# Patient Record
Sex: MALE | Race: ASIAN | NOT HISPANIC OR LATINO | Employment: STUDENT | URBAN - METROPOLITAN AREA
[De-identification: names, ages, dates, MRNs, and addresses within clinical notes are randomized per-mention and may not be internally consistent; named-entity substitution may affect disease eponyms.]

---

## 2017-11-01 ENCOUNTER — OFFICE VISIT (OUTPATIENT)
Dept: FAMILY MEDICINE | Facility: CLINIC | Age: 22
End: 2017-11-01
Payer: COMMERCIAL

## 2017-11-01 VITALS
TEMPERATURE: 100.3 F | OXYGEN SATURATION: 98 % | DIASTOLIC BLOOD PRESSURE: 82 MMHG | BODY MASS INDEX: 29.82 KG/M2 | HEART RATE: 96 BPM | WEIGHT: 213 LBS | HEIGHT: 71 IN | SYSTOLIC BLOOD PRESSURE: 139 MMHG

## 2017-11-01 DIAGNOSIS — J02.0 STREPTOCOCCAL PHARYNGITIS: Primary | ICD-10-CM

## 2017-11-01 DIAGNOSIS — R07.0 THROAT PAIN: ICD-10-CM

## 2017-11-01 LAB
DEPRECATED S PYO AG THROAT QL EIA: ABNORMAL
SPECIMEN SOURCE: ABNORMAL

## 2017-11-01 PROCEDURE — 87880 STREP A ASSAY W/OPTIC: CPT | Performed by: FAMILY MEDICINE

## 2017-11-01 PROCEDURE — 99214 OFFICE O/P EST MOD 30 MIN: CPT | Performed by: FAMILY MEDICINE

## 2017-11-01 ASSESSMENT — PAIN SCALES - GENERAL: PAINLEVEL: EXTREME PAIN (8)

## 2017-11-01 NOTE — LETTER
[address] [address]  Phone: [phone]    November 1, 2017        Iker Berrios  [address] [facility] [address]          To whom it may concern:    RE: Iker Berrios    Patient was seen and treated today at our clinic and missed school.    Please contact me for questions or concerns.      Sincerely,        Segundo Pina MD

## 2017-11-01 NOTE — MR AVS SNAPSHOT
After Visit Summary   11/1/2017    Iker Berrios    MRN: [id number]           Patient Information     Date Of Birth          1995        Visit Information        Provider Department      11/1/2017 8:40 AM Segundo Pina MD [facility]        Today's Diagnoses     Streptococcal pharyngitis    -  1    Throat pain          Care Instructions    At [facility], we strive to deliver an exceptional experience to you, every time we see you.  If you receive a survey in the mail, please send us back your thoughts. We really do value your feedback.    Based on your medical history, these are the current health maintenance/preventive care services that you are due for (some may have been done at this visit.)  Health Maintenance Due   Topic Date Due     TETANUS IMMUNIZATION (SYSTEM ASSIGNED)  01/17/2016     INFLUENZA VACCINE (SYSTEM ASSIGNED)  09/01/2017         Suggested websites for health information:  WwwCANWE STUDIOS : Up to date and easily searchable information on multiple topics.  Www.Race Yourself.gov : medication info, interactive tutorials, watch real surgeries online  Www.familydoctor.org : good info from the Academy of Family Physicians  Www.cdc.gov : public health info, travel advisories, epidemics (H1N1)  Www.aap.org : children's health info, normal development, vaccinations  Www.health.state[place] : [place] dept of health, public health issues in [place], N1N1    Your care team:                            Family Medicine Internal Medicine   MD Mike Wei MD Shantel Branch-Fleming, MD Katya Georgiev PA-C Nam Ho, MD Pediatrics   ALEXANDRE Gibson, GERARD Mclaughlin APRN MD Yesenia Duenas MD Deborah Mielke, MD Kim Thein, APRN CNP      Clinic hours: Monday - Thursday 7 am-7 pm; Fridays 7 am-5 pm.   Urgent care: Monday - Friday 11 am-9 pm; Saturday and Sunday 9 am-5 pm.  Pharmacy : Monday -Thursday 8  "am-8 pm; Friday 8 am-6 pm; Saturday and Sunday 9 am-5 pm.     Clinic: [phone]   Pharmacy: [phone]            Follow-ups after your visit        Your next 10 appointments already scheduled     Nov 01, 2017  8:40 AM CDT   Office Visit with Segundo Pina MD   [facility]    [address] [place] [id number]   [phone]           Bring a current list of meds and any records pertaining to this visit. For Physicals, please bring immunization records and any forms needing to be filled out. Please arrive 10 minutes early to complete paperwork.              Who to contact     If you have questions or need follow up information about today's clinic visit or your schedule please contact [facility] directly at [phone].  Normal or non-critical lab and imaging results will be communicated to you by MyChart, letter or phone within 4 business days after the clinic has received the results. If you do not hear from us within 7 days, please contact the clinic through [employer] or phone. If you have a critical or abnormal lab result, we will notify you by phone as soon as possible.  Submit refill requests through [employer] or call your pharmacy and they will forward the refill request to us. Please allow 3 business days for your refill to be completed.          Additional Information About Your Visit        Innography Information     Innography lets you send messages to your doctor, view your test results, renew your prescriptions, schedule appointments and more. To sign up, go to [email or web address] . Click on \"Log in\" on the left side of the screen, which will take you to the Welcome page. Then click on \"Sign up Now\" on the right side of the page.     You will be asked to enter the access code listed below, as well as some personal information. Please follow the directions to create your username and password.     Your access code " "is: [email or web address]  Expires: 2018  8:21 AM     Your access code will  in 90 days. If you need help or a new code, please call your [place] clinic or [phone].        Care EveryWhere ID     This is your Care EveryWhere ID. This could be used by other organizations to access your [place] medical records  [id number]        Your Vitals Were     Pulse Temperature Height Pulse Oximetry BMI (Body Mass Index)       96 100.3  F (37.9  C) (Oral) 5' 10.5\" (1.791 m) 98% 30.13 kg/m2        Blood Pressure from Last 3 Encounters:   17 139/82   11 118/75    Weight from Last 3 Encounters:   17 213 lb (96.6 kg)   11 164 lb 6.4 oz (74.6 kg) (77 %)*     * Growth percentiles are based on [facility] 2-20 Years data.              We Performed the Following     Strep, Rapid Screen          Today's Medication Changes          These changes are accurate as of: 17  8:21 AM.  If you have any questions, ask your nurse or doctor.               Start taking these medicines.        Dose/Directions    amoxicillin-clavulanate 875-125 MG per tablet   Commonly known as:  AUGMENTIN   Used for:  Streptococcal pharyngitis   Started by:  Segundo Pina MD        Dose:  1 tablet   Take 1 tablet by mouth 2 times daily for 10 days   Quantity:  20 tablet   Refills:  0            Where to get your medicines      These medications were sent to [facility] - [place], [place] - [address]  [address], [facility] [address]     Phone:  [phone]     amoxicillin-clavulanate 875-125 MG per tablet                Primary Care Provider Fax #    Provider Not In System [phone]                Equal Access to Services     [facility] AH: Dai Adamson, waumang bain, qaybta kaalmada rachael, kaur arrington. So [facility] [phone].    ATENCIÓN: Si habla español, tiene a massey disposición servicios gratuitos de asistencia lingüística. Llame al " [phone].    We comply with applicable federal civil rights laws and [place] laws. We do not discriminate on the basis of race, color, national origin, age, disability, sex, sexual orientation, or gender identity.            Thank you!     Thank you for choosing [facility]  for your care. Our goal is always to provide you with excellent care. Hearing back from our patients is one way we can continue to improve our services. Please take a few minutes to complete the written survey that you may receive in the mail after your visit with us. Thank you!             Your Updated Medication List - Protect others around you: Learn how to safely use, store and throw away your medicines at www.disposemymeds.org.          This list is accurate as of: 11/1/17  8:21 AM.  Always use your most recent med list.                   Brand Name Dispense Instructions for use Diagnosis    amoxicillin-clavulanate 875-125 MG per tablet    AUGMENTIN    20 tablet    Take 1 tablet by mouth 2 times daily for 10 days    Streptococcal pharyngitis

## 2017-11-01 NOTE — PATIENT INSTRUCTIONS
At [facility], we strive to deliver an exceptional experience to you, every time we see you.  If you receive a survey in the mail, please send us back your thoughts. We really do value your feedback.    Based on your medical history, these are the current health maintenance/preventive care services that you are due for (some may have been done at this visit.)  Health Maintenance Due   Topic Date Due     TETANUS IMMUNIZATION (SYSTEM ASSIGNED)  01/17/2016     INFLUENZA VACCINE (SYSTEM ASSIGNED)  09/01/2017         Suggested websites for health information:  Www.TeamBuy.org : Up to date and easily searchable information on multiple topics.  Www.PeopleString.gov : medication info, interactive tutorials, watch real surgeries online  Www.familydoctor.org : good info from the Academy of Family Physicians  Www.cdc.gov : public health info, travel advisories, epidemics (H1N1)  Www.aap.org : children's health info, normal development, vaccinations  Www.health.[facility].[place].us : [place] dept of health, public health issues in [place], N1N1    Your care team:                            Family Medicine Internal Medicine   MD Mike Wei MD Shantel Branch-Fleming, MD Katya Georgiev PA-C Nam Ho, MD Pediatrics   ALEXANDRE Gibson, GERARD Mclaughlin APRN CNP   MD Yesenia Coleman MD Deborah Mielke, MD Kim Thein, APRN CNP      Clinic hours: Monday - Thursday 7 am-7 pm; Fridays 7 am-5 pm.   Urgent care: Monday - Friday 11 am-9 pm; Saturday and Sunday 9 am-5 pm.  Pharmacy : Monday -Thursday 8 am-8 pm; Friday 8 am-6 pm; Saturday and Sunday 9 am-5 pm.     Clinic: [phone]   Pharmacy: [phone]

## 2017-11-01 NOTE — NURSING NOTE
"Chief Complaint   Patient presents with     Pharyngitis       Initial /82 (BP Location: Left arm, Patient Position: Chair, Cuff Size: Adult Large)  Pulse 96  Temp 100.3  F (37.9  C) (Oral)  Ht 5' 10.5\" (1.791 m)  Wt 213 lb (96.6 kg)  SpO2 98%  BMI 30.13 kg/m2 Estimated body mass index is 30.13 kg/(m^2) as calculated from the following:    Height as of this encounter: 5' 10.5\" (1.791 m).    Weight as of this encounter: 213 lb (96.6 kg).  Medication Reconciliation: complete     Gricelda Ramirez MA      "

## 2017-11-01 NOTE — PROGRESS NOTES
"  SUBJECTIVE:   Iker Berrios is a 21 year old male who presents to clinic today for the following health issues:      Acute Illness   Acute illness concerns: sore throat/fever  Onset: Monday    Fever: YES    Chills/Sweats: YES    Headache (location?): YES    Sinus Pressure:no    Conjunctivitis:  no    Ear Pain: YES: right    Rhinorrhea: no    Congestion: YES    Sore Throat: YES     Cough: no    Wheeze: no    Decreased Appetite: YES    Nausea: no    Vomiting: no    Diarrhea:  no    Dysuria/Freq.: no    Fatigue/Achiness: YES    Sick/Strep Exposure: no     Therapies Tried and outcome: dayquil, nyquil,advil      Problem list and histories reviewed & adjusted, as indicated.  Additional history: as documented    There is no problem list on file for this patient.    Past Surgical History:   Procedure Laterality Date     none         Social History   Substance Use Topics     Smoking status: Never Smoker     Smokeless tobacco: Never Used     Alcohol use No     Family History   Problem Relation Age of Onset     DIABETES Maternal Grandmother      Asthma No family hx of      C.A.D. No family hx of      Hypertension No family hx of      CEREBROVASCULAR DISEASE No family hx of      CANCER No family hx of              Reviewed and updated as needed this visit by clinical staffTobacco  Allergies  Meds  Med Hx  Surg Hx  Fam Hx  Soc Hx      Reviewed and updated as needed this visit by Provider         ROS:  Constitutional, HEENT, cardiovascular, pulmonary, GI, , musculoskeletal, neuro, skin, endocrine and psych systems are negative, except as otherwise noted.      OBJECTIVE:   /82 (BP Location: Left arm, Patient Position: Chair, Cuff Size: Adult Large)  Pulse 96  Temp 100.3  F (37.9  C) (Oral)  Ht 5' 10.5\" (1.791 m)  Wt 213 lb (96.6 kg)  SpO2 98%  BMI 30.13 kg/m2  Body mass index is 30.13 kg/(m^2).  GENERAL: healthy, alert and no distress  NECK: no adenopathy, no asymmetry, masses, or scars and thyroid normal " to palpation  RESP: lungs clear to auscultation - no rales, rhonchi or wheezes  CV: regular rate and rhythm, normal S1 S2, no S3 or S4, no murmur, click or rub, no peripheral edema and peripheral pulses strong  ABDOMEN: soft, nontender, no hepatosplenomegaly, no masses and bowel sounds normal  MS: no gross musculoskeletal defects noted, no edema  PP: erythematous, right tonsil enlarged  Diagnostic Test Results:  none     ASSESSMENT/PLAN:     1. Streptococcal pharyngitis  Treat with Augmentin. If no improvements, will need to think about PTA. Discussed with patient if no improvements in 2 days, patient should f/u in clinic for recheck. Patient understands and agrees with plan.  - amoxicillin-clavulanate (AUGMENTIN) 875-125 MG per tablet; Take 1 tablet by mouth 2 times daily for 10 days  Dispense: 20 tablet; Refill: 0    2. Throat pain  As above.  - Strep, Rapid Screen    See Patient Instructions    Segundo Pina MD, MD  [facility]

## 2018-03-07 ENCOUNTER — OFFICE VISIT (OUTPATIENT)
Dept: URGENT CARE | Facility: URGENT CARE | Age: 23
End: 2018-03-07
Payer: COMMERCIAL

## 2018-03-07 VITALS
OXYGEN SATURATION: 94 % | TEMPERATURE: 103 F | BODY MASS INDEX: 30.71 KG/M2 | WEIGHT: 217.1 LBS | SYSTOLIC BLOOD PRESSURE: 147 MMHG | HEART RATE: 78 BPM | DIASTOLIC BLOOD PRESSURE: 76 MMHG

## 2018-03-07 DIAGNOSIS — J36 PERITONSILLAR ABSCESS: ICD-10-CM

## 2018-03-07 DIAGNOSIS — R19.7 DIARRHEA, UNSPECIFIED TYPE: ICD-10-CM

## 2018-03-07 DIAGNOSIS — R68.89 FLU-LIKE SYMPTOMS: ICD-10-CM

## 2018-03-07 DIAGNOSIS — R07.0 THROAT PAIN: Primary | ICD-10-CM

## 2018-03-07 DIAGNOSIS — R50.9 FEVER AND CHILLS: ICD-10-CM

## 2018-03-07 LAB
ALBUMIN SERPL-MCNC: 3.7 G/DL (ref 3.4–5)
ALP SERPL-CCNC: 57 U/L (ref 40–150)
ALT SERPL W P-5'-P-CCNC: 43 U/L (ref 0–70)
ANION GAP SERPL CALCULATED.3IONS-SCNC: 11 MMOL/L (ref 3–14)
AST SERPL W P-5'-P-CCNC: 63 U/L (ref 0–45)
BASOPHILS # BLD AUTO: 0 10E9/L (ref 0–0.2)
BASOPHILS NFR BLD AUTO: 0.2 %
BILIRUB SERPL-MCNC: 0.2 MG/DL (ref 0.2–1.3)
BUN SERPL-MCNC: 17 MG/DL (ref 7–30)
CALCIUM SERPL-MCNC: 8.2 MG/DL (ref 8.5–10.1)
CHLORIDE SERPL-SCNC: 104 MMOL/L (ref 94–109)
CO2 SERPL-SCNC: 22 MMOL/L (ref 20–32)
CREAT SERPL-MCNC: 1.57 MG/DL (ref 0.66–1.25)
DEPRECATED S PYO AG THROAT QL EIA: NORMAL
DIFFERENTIAL METHOD BLD: ABNORMAL
EOSINOPHIL # BLD AUTO: 0 10E9/L (ref 0–0.7)
EOSINOPHIL NFR BLD AUTO: 0 %
ERYTHROCYTE [DISTWIDTH] IN BLOOD BY AUTOMATED COUNT: 15.3 % (ref 10–15)
FLUAV+FLUBV AG SPEC QL: NEGATIVE
FLUAV+FLUBV AG SPEC QL: NEGATIVE
GFR SERPL CREATININE-BSD FRML MDRD: 54 ML/MIN/1.7M2
GLUCOSE SERPL-MCNC: 88 MG/DL (ref 70–99)
HCT VFR BLD AUTO: 44.6 % (ref 40–53)
HETEROPH AB SER QL: NEGATIVE
HGB BLD-MCNC: 14.5 G/DL (ref 13.3–17.7)
LYMPHOCYTES # BLD AUTO: 1.1 10E9/L (ref 0.8–5.3)
LYMPHOCYTES NFR BLD AUTO: 20.5 %
MCH RBC QN AUTO: 22.8 PG (ref 26.5–33)
MCHC RBC AUTO-ENTMCNC: 32.5 G/DL (ref 31.5–36.5)
MCV RBC AUTO: 70 FL (ref 78–100)
MONOCYTES # BLD AUTO: 0.3 10E9/L (ref 0–1.3)
MONOCYTES NFR BLD AUTO: 6.4 %
NEUTROPHILS # BLD AUTO: 3.9 10E9/L (ref 1.6–8.3)
NEUTROPHILS NFR BLD AUTO: 72.9 %
PLATELET # BLD AUTO: 148 10E9/L (ref 150–450)
POTASSIUM SERPL-SCNC: 4.1 MMOL/L (ref 3.4–5.3)
PROT SERPL-MCNC: 7.8 G/DL (ref 6.8–8.8)
RBC # BLD AUTO: 6.35 10E12/L (ref 4.4–5.9)
SODIUM SERPL-SCNC: 137 MMOL/L (ref 133–144)
SPECIMEN SOURCE: NORMAL
SPECIMEN SOURCE: NORMAL
WBC # BLD AUTO: 5.3 10E9/L (ref 4–11)

## 2018-03-07 PROCEDURE — 85025 COMPLETE CBC W/AUTO DIFF WBC: CPT | Performed by: NURSE PRACTITIONER

## 2018-03-07 PROCEDURE — 99214 OFFICE O/P EST MOD 30 MIN: CPT | Performed by: NURSE PRACTITIONER

## 2018-03-07 PROCEDURE — 87804 INFLUENZA ASSAY W/OPTIC: CPT | Performed by: NURSE PRACTITIONER

## 2018-03-07 PROCEDURE — 86308 HETEROPHILE ANTIBODY SCREEN: CPT | Performed by: NURSE PRACTITIONER

## 2018-03-07 PROCEDURE — 36415 COLL VENOUS BLD VENIPUNCTURE: CPT | Performed by: NURSE PRACTITIONER

## 2018-03-07 PROCEDURE — 87081 CULTURE SCREEN ONLY: CPT | Performed by: NURSE PRACTITIONER

## 2018-03-07 PROCEDURE — 87880 STREP A ASSAY W/OPTIC: CPT | Performed by: NURSE PRACTITIONER

## 2018-03-07 PROCEDURE — 80053 COMPREHEN METABOLIC PANEL: CPT | Performed by: NURSE PRACTITIONER

## 2018-03-07 RX ORDER — LOPERAMIDE HYDROCHLORIDE 2 MG/1
TABLET ORAL
Qty: 30 TABLET | Refills: 0 | Status: SHIPPED | OUTPATIENT
Start: 2018-03-07 | End: 2018-08-15

## 2018-03-07 ASSESSMENT — ENCOUNTER SYMPTOMS
CARDIOVASCULAR NEGATIVE: 1
MYALGIAS: 1
COUGH: 1
ENDOCRINE NEGATIVE: 1
PSYCHIATRIC NEGATIVE: 1
SORE THROAT: 1
RHINORRHEA: 1
FATIGUE: 1
CHILLS: 1
DIARRHEA: 1
FEVER: 1
NEUROLOGICAL NEGATIVE: 1
EYES NEGATIVE: 1

## 2018-03-07 NOTE — NURSING NOTE
The following medication was given:     MEDICATION: Toradol 60 mg  ROUTE: IM  SITE: Ventrogluteal - Left  DOSE: 60 mg   LOT #: [id number]  :  Glaxstar  EXPIRATION DATE:  10/2019  NDC#: 19429-659-96      Delicia Hamilton

## 2018-03-07 NOTE — PROGRESS NOTES
SUBJECTIVE:   Iker Berrios is a 22 year old male presenting with a chief complaint of   Chief Complaint   Patient presents with     Pharyngitis     Patient complains of sore throat, fever, and body aches        He is an established patient of [facility].    Onset of symptoms was 1 week(s) ago.  Course of illness is worsening.    Severity moderate  Current and Associated symptoms: fever, sore throat, body aches   Treatment measures tried include Tylenol/Ibuprofen, night quil  Predisposing factors include None.        Review of Systems   Constitutional: Positive for chills, fatigue and fever.   HENT: Positive for congestion, rhinorrhea and sore throat.    Eyes: Negative.    Respiratory: Positive for cough.    Cardiovascular: Negative.    Gastrointestinal: Positive for diarrhea.   Endocrine: Negative.    Genitourinary: Negative.    Musculoskeletal: Positive for myalgias.   Skin: Negative.    Neurological: Negative.    Psychiatric/Behavioral: Negative.        Past Medical History:   Diagnosis Date     NO ACTIVE PROBLEMS      Family History   Problem Relation Age of Onset     DIABETES Maternal Grandmother      Asthma No family hx of      C.A.D. No family hx of      Hypertension No family hx of      CEREBROVASCULAR DISEASE No family hx of      CANCER No family hx of      Current Outpatient Prescriptions   Medication Sig Dispense Refill     amoxicillin-clavulanate (AUGMENTIN) 875-125 MG per tablet Take 1 tablet by mouth 2 times daily for 10 days 20 tablet 0     loperamide (IMODIUM A-D) 2 MG tablet Take 2 tabs (4 mg) after first loose stool, and then take one tab (2 mg) after each diarrheal stool.  Max of 8 tabs (16 mg) per day. 30 tablet 0     Social History   Substance Use Topics     Smoking status: Never Smoker     Smokeless tobacco: Never Used     Alcohol use No       OBJECTIVE  /76 (BP Location: Left arm, Patient Position: Chair, Cuff Size: Adult Regular)  Pulse 78  Temp 103  F (39.4  C) (Tympanic)  Wt 217 lb  1.6 oz (98.5 kg)  SpO2 94%  BMI 30.71 kg/m2    Physical Exam   Constitutional: He is oriented to person, place, and time. He appears well-developed and well-nourished.   HENT:   Hypertrophy and erythema peritonsillar area.  Also having exudate on the right side. clear nasal rhinorrhea, edema of the nasal mucosa.   Eyes: Conjunctivae and EOM are normal. Pupils are equal, round, and reactive to light.   Neck: Normal range of motion. Neck supple.   Cardiovascular: Normal rate.    Pulmonary/Chest: Effort normal and breath sounds normal.   Abdominal: Soft. Bowel sounds are normal.   Musculoskeletal: Normal range of motion.   Neurological: He is alert and oriented to person, place, and time.   Skin: Skin is warm and dry.       Labs:  Results for orders placed or performed in visit on 03/07/18 (from the past 24 hour(s))   Strep, Rapid Screen   Result Value Ref Range    Specimen Description Throat     Rapid Strep A Screen       NEGATIVE: No Group A streptococcal antigen detected by immunoassay, await culture report.   Influenza A/B antigen   Result Value Ref Range    Influenza A/B Agn Specimen Nasal     Influenza A Negative NEG^Negative    Influenza B Negative NEG^Negative   CBC with platelets and differential   Result Value Ref Range    WBC 5.3 4.0 - 11.0 10e9/L    RBC Count 6.35 (H) 4.4 - 5.9 10e12/L    Hemoglobin 14.5 13.3 - 17.7 g/dL    Hematocrit 44.6 40.0 - 53.0 %    MCV 70 (L) 78 - 100 fl    MCH 22.8 (L) 26.5 - 33.0 pg    MCHC 32.5 31.5 - 36.5 g/dL    RDW 15.3 (H) 10.0 - 15.0 %    Platelet Count 148 (L) 150 - 450 10e9/L    Diff Method Automated Method     % Neutrophils 72.9 %    % Lymphocytes 20.5 %    % Monocytes 6.4 %    % Eosinophils 0.0 %    % Basophils 0.2 %    Absolute Neutrophil 3.9 1.6 - 8.3 10e9/L    Absolute Lymphocytes 1.1 0.8 - 5.3 10e9/L    Absolute Monocytes 0.3 0.0 - 1.3 10e9/L    Absolute Eosinophils 0.0 0.0 - 0.7 10e9/L    Absolute Basophils 0.0 0.0 - 0.2 10e9/L   Mononucleosis screen   Result  Value Ref Range    Mononucleosis Screen Negative NEG^Negative       X-Ray was not done.    ASSESSMENT:      ICD-10-CM    1. Throat pain R07.0 Strep, Rapid Screen     Beta strep group A culture     Mononucleosis screen   2. Flu-like symptoms R68.89 Influenza A/B antigen   3. Peritonsillar abscess J36 amoxicillin-clavulanate (AUGMENTIN) 875-125 MG per tablet   4. Diarrhea, unspecified type R19.7 loperamide (IMODIUM A-D) 2 MG tablet   5. Fever and chills R50.9 CBC with platelets and differential     Comprehensive metabolic panel (BMP + Alb, Alk Phos, ALT, AST, Total. Bili, TP)          PLAN:  I discussed lab results with the patient.  The patient has peritonsillar abscess.  I am ordering an antibiotic and strongly advised follow-up with primary care or to emergency room if symptoms are getting worse.        Patient Instructions     Peritonsillar Abscess    A peritonsillar abscess is a collection of pus that forms near the tonsils. It is a complication of a bacterial infection of the tonsils (tonsillitis). The abscess causes one or both tonsils to swell. The infection and swelling may spread to nearby tissues. If tissues swell enough to block the throat, the condition can become life-threatening. It is also dangerous if the abscess bursts and the infection spreads or is breathed into the lungs. The goal is to treat a peritonsillar abscess before it worsens and threatens your health.  Signs and symptoms of peritonsillar abscess    Severe sore throat (often worse on one side)    Swollen and enlarged tonsils    Fever and chills    Pain when swallowing or trouble opening the mouth    Voice changes     Drooling    Swollen or tender glands in the neck  Diagnosing peritonsillar abscess  Your healthcare provider will examine you and look inside your mouth and throat. You will be asked about your symptoms and health history. Tests or procedures may be done as well, including those listed below.    Throat swab. This test checks  for infection. It is done by wiping a sterile cotton swab in the back of the throat. The swab is then sent to a lab for study.    Blood tests. These might be done to check how your body is responding to the infection.    Ultrasound or computed tomography (CT) scans. These tests provide images of the abscess. They also help rule out other problems.    Needle aspiration. This procedure removes a sample of pus from the abscess with a needle. The sample is then sent to a lab to check for infection. In some cases, all of the pus is removed from the abscess.  Treating peritonsillar abscess  The abscess itself can be treated. Treatment of the underlying infection is also needed. Common treatments are listed below.    Medicines. Antibiotics are needed to treat the underlying infection. These may be taken by mouth or given by IV. Pain relievers may also be given, if needed.    Drainage of the abscess. A procedure may be needed to drain the pus from the abscess. Pus may be removed from the abscess with a needle (needle aspiration). Or, a small incision is made in the abscess. The pus is then drained and suctioned from the throat and mouth. This is called incision and drainage.    Tonsillectomy. This is surgery to remove the tonsils. It may be done if the abscess does not improve with medicines. It may also be done if you have frequent tonsil infections or abscesses.  Recovery and follow-up  Treating the bacterial infection generally relieves the problem. Once the infection resolves, you should recover completely. Follow up with your healthcare provider as directed. And if you develop another throat infection, see your healthcare provider promptly.  Date Last Reviewed: 11/1/2016 2000-2017 [employer]. [address] [place] [address], [place] [address]. All rights reserved. This information is not intended as a substitute for professional medical care. Always follow your healthcare professional's  instructions.

## 2018-03-07 NOTE — MR AVS SNAPSHOT
After Visit Summary   3/7/2018    Iker Berrios    MRN: [id number]           Patient Information     Date Of Birth          1995        Visit Information        Provider Department      3/7/2018 11:35 AM Danika Deluca NP [facility]        Today's Diagnoses     Throat pain    -  1    Flu-like symptoms        Peritonsillar abscess        Diarrhea, unspecified type        Fever and chills          Care Instructions      Peritonsillar Abscess    A peritonsillar abscess is a collection of pus that forms near the tonsils. It is a complication of a bacterial infection of the tonsils (tonsillitis). The abscess causes one or both tonsils to swell. The infection and swelling may spread to nearby tissues. If tissues swell enough to block the throat, the condition can become life-threatening. It is also dangerous if the abscess bursts and the infection spreads or is breathed into the lungs. The goal is to treat a peritonsillar abscess before it worsens and threatens your health.  Signs and symptoms of peritonsillar abscess    Severe sore throat (often worse on one side)    Swollen and enlarged tonsils    Fever and chills    Pain when swallowing or trouble opening the mouth    Voice changes     Drooling    Swollen or tender glands in the neck  Diagnosing peritonsillar abscess  Your healthcare provider will examine you and look inside your mouth and throat. You will be asked about your symptoms and health history. Tests or procedures may be done as well, including those listed below.    Throat swab. This test checks for infection. It is done by wiping a sterile cotton swab in the back of the throat. The swab is then sent to a lab for study.    Blood tests. These might be done to check how your body is responding to the infection.    Ultrasound or computed tomography (CT) scans. These tests provide images of the abscess. They also help rule out other problems.    Needle aspiration. This  procedure removes a sample of pus from the abscess with a needle. The sample is then sent to a lab to check for infection. In some cases, all of the pus is removed from the abscess.  Treating peritonsillar abscess  The abscess itself can be treated. Treatment of the underlying infection is also needed. Common treatments are listed below.    Medicines. Antibiotics are needed to treat the underlying infection. These may be taken by mouth or given by IV. Pain relievers may also be given, if needed.    Drainage of the abscess. A procedure may be needed to drain the pus from the abscess. Pus may be removed from the abscess with a needle (needle aspiration). Or, a small incision is made in the abscess. The pus is then drained and suctioned from the throat and mouth. This is called incision and drainage.    Tonsillectomy. This is surgery to remove the tonsils. It may be done if the abscess does not improve with medicines. It may also be done if you have frequent tonsil infections or abscesses.  Recovery and follow-up  Treating the bacterial infection generally relieves the problem. Once the infection resolves, you should recover completely. Follow up with your healthcare provider as directed. And if you develop another throat infection, see your healthcare provider promptly.  Date Last Reviewed: 11/1/2016 2000-2017 [employer]. [address] [place] [address], [place] [address]. All rights reserved. This information is not intended as a substitute for professional medical care. Always follow your healthcare professional's instructions.                Follow-ups after your visit        Who to contact     If you have questions or need follow up information about today's clinic visit or your schedule please contact [facility] directly at [phone].  Normal or non-critical lab and imaging results will be communicated to you by MyChart, letter or phone within 4 business days after the clinic has  "received the results. If you do not hear from us within 7 days, please contact the clinic through [employer] or phone. If you have a critical or abnormal lab result, we will notify you by phone as soon as possible.  Submit refill requests through [employer] or call your pharmacy and they will forward the refill request to us. Please allow 3 business days for your refill to be completed.          Additional Information About Your Visit        [employer] Information     [employer] lets you send messages to your doctor, view your test results, renew your prescriptions, schedule appointments and more. To sign up, go to [email or web address] . Click on \"Log in\" on the left side of the screen, which will take you to the Welcome page. Then click on \"Sign up Now\" on the right side of the page.     You will be asked to enter the access code listed below, as well as some personal information. Please follow the directions to create your username and password.     Your access code is: [email or web address]  Expires: 2018 12:58 PM     Your access code will  in 90 days. If you need help or a new code, please call your [place] clinic or [phone].        Care EveryWhere ID     This is your Care EveryWhere ID. This could be used by other organizations to access your [place] medical records  [id number]        Your Vitals Were     Pulse Temperature Pulse Oximetry BMI (Body Mass Index)          78 103  F (39.4  C) (Tympanic) 94% 30.71 kg/m2         Blood Pressure from Last 3 Encounters:   18 147/76   17 139/82   11 118/75    Weight from Last 3 Encounters:   18 217 lb 1.6 oz (98.5 kg)   17 213 lb (96.6 kg)   11 164 lb 6.4 oz (74.6 kg) (77 %)*     * Growth percentiles are based on CDC 2-20 Years data.              We Performed the Following     Beta strep group A culture     CBC with platelets and differential     Comprehensive metabolic panel (BMP + Alb, Alk Phos, ALT, AST, Total. Bili, TP)     " Influenza A/B antigen     Mononucleosis screen     Strep, Rapid Screen          Today's Medication Changes          These changes are accurate as of 3/7/18 12:58 PM.  If you have any questions, ask your nurse or doctor.               Start taking these medicines.        Dose/Directions    amoxicillin-clavulanate 875-125 MG per tablet   Commonly known as:  AUGMENTIN   Used for:  Peritonsillar abscess   Started by:  Danika Deluca NP        Dose:  1 tablet   Take 1 tablet by mouth 2 times daily for 10 days   Quantity:  20 tablet   Refills:  0       loperamide 2 MG tablet   Commonly known as:  IMODIUM A-D   Used for:  Diarrhea, unspecified type   Started by:  Danika Deluca NP        Take 2 tabs (4 mg) after first loose stool, and then take one tab (2 mg) after each diarrheal stool.  Max of 8 tabs (16 mg) per day.   Quantity:  30 tablet   Refills:  0            Where to get your medicines      These medications were [place] [facility] [place] - [address]  [address], [facility] [address]     Phone:  [phone]     amoxicillin-clavulanate 875-125 MG per tablet    loperamide 2 MG tablet                Primary Care Provider    Provider Not In System                Equal Access to Services     MATTHEW ADAMS AH: Dai mortensen hadasho Soomaali, waaxda luqadaha, qaybta kaalmada adeegyada, kaur arrington. So [facility] [phone].    ATENCIÓN: Si habla español, tiene a massey disposición servicios gratuitos de asistencia lingüística. Llame al [phone].    We comply with applicable federal civil rights laws and [place] laws. We do not discriminate on the basis of race, color, national origin, age, disability, sex, sexual orientation, or gender identity.            Thank you!     Thank you for choosing [facility]  for your care. Our goal is always to provide you with excellent care. Hearing back from our patients is one way we can continue to  improve our services. Please take a few minutes to complete the written survey that you may receive in the mail after your visit with us. Thank you!             Your Updated Medication List - Protect others around you: Learn how to safely use, store and throw away your medicines at www.disposemymeds.org.          This list is accurate as of 3/7/18 12:58 PM.  Always use your most recent med list.                   Brand Name Dispense Instructions for use Diagnosis    amoxicillin-clavulanate 875-125 MG per tablet    AUGMENTIN    20 tablet    Take 1 tablet by mouth 2 times daily for 10 days    Peritonsillar abscess       loperamide 2 MG tablet    IMODIUM A-D    30 tablet    Take 2 tabs (4 mg) after first loose stool, and then take one tab (2 mg) after each diarrheal stool.  Max of 8 tabs (16 mg) per day.    Diarrhea, unspecified type

## 2018-03-07 NOTE — LETTER
[facility]  [address]  [facility] [address]  Phone: [phone]    March 7, 2018        Iker Berrios  [address]  [facility] [address]          To whom it may concern:    RE: Iker Berrios    Patient was seen and treated today. Please allow him to rest home 3/8/2018.    Please contact me for questions or concerns.      Sincerely,        Danika Deluca NP

## 2018-03-07 NOTE — NURSING NOTE
"Chief Complaint   Patient presents with     Pharyngitis     Patient complains of sore throat, fever, and body aches        Initial /76 (BP Location: Left arm, Patient Position: Chair, Cuff Size: Adult Regular)  Pulse 78  Temp 103  F (39.4  C) (Tympanic)  Wt 217 lb 1.6 oz (98.5 kg)  SpO2 94%  BMI 30.71 kg/m2 Estimated body mass index is 30.71 kg/(m^2) as calculated from the following:    Height as of 11/1/17: 5' 10.5\" (1.791 m).    Weight as of this encounter: 217 lb 1.6 oz (98.5 kg).  Medication Reconciliation: orlando Hamilton    "

## 2018-03-07 NOTE — PATIENT INSTRUCTIONS
Peritonsillar Abscess    A peritonsillar abscess is a collection of pus that forms near the tonsils. It is a complication of a bacterial infection of the tonsils (tonsillitis). The abscess causes one or both tonsils to swell. The infection and swelling may spread to nearby tissues. If tissues swell enough to block the throat, the condition can become life-threatening. It is also dangerous if the abscess bursts and the infection spreads or is breathed into the lungs. The goal is to treat a peritonsillar abscess before it worsens and threatens your health.  Signs and symptoms of peritonsillar abscess    Severe sore throat (often worse on one side)    Swollen and enlarged tonsils    Fever and chills    Pain when swallowing or trouble opening the mouth    Voice changes     Drooling    Swollen or tender glands in the neck  Diagnosing peritonsillar abscess  Your healthcare provider will examine you and look inside your mouth and throat. You will be asked about your symptoms and health history. Tests or procedures may be done as well, including those listed below.    Throat swab. This test checks for infection. It is done by wiping a sterile cotton swab in the back of the throat. The swab is then sent to a lab for study.    Blood tests. These might be done to check how your body is responding to the infection.    Ultrasound or computed tomography (CT) scans. These tests provide images of the abscess. They also help rule out other problems.    Needle aspiration. This procedure removes a sample of pus from the abscess with a needle. The sample is then sent to a lab to check for infection. In some cases, all of the pus is removed from the abscess.  Treating peritonsillar abscess  The abscess itself can be treated. Treatment of the underlying infection is also needed. Common treatments are listed below.    Medicines. Antibiotics are needed to treat the underlying infection. These may be taken by mouth or given by IV. Pain  relievers may also be given, if needed.    Drainage of the abscess. A procedure may be needed to drain the pus from the abscess. Pus may be removed from the abscess with a needle (needle aspiration). Or, a small incision is made in the abscess. The pus is then drained and suctioned from the throat and mouth. This is called incision and drainage.    Tonsillectomy. This is surgery to remove the tonsils. It may be done if the abscess does not improve with medicines. It may also be done if you have frequent tonsil infections or abscesses.  Recovery and follow-up  Treating the bacterial infection generally relieves the problem. Once the infection resolves, you should recover completely. Follow up with your healthcare provider as directed. And if you develop another throat infection, see your healthcare provider promptly.  Date Last Reviewed: 11/1/2016 2000-2017 [employer]. [address] [place] [address]. All rights reserved. This information is not intended as a substitute for professional medical care. Always follow your healthcare professional's instructions.

## 2018-03-08 LAB
BACTERIA SPEC CULT: NORMAL
SPECIMEN SOURCE: NORMAL

## 2018-06-19 ENCOUNTER — TRANSFERRED RECORDS (OUTPATIENT)
Dept: HEALTH INFORMATION MANAGEMENT | Facility: CLINIC | Age: 23
End: 2018-06-19

## 2018-06-21 ENCOUNTER — MEDICAL CORRESPONDENCE (OUTPATIENT)
Dept: HEALTH INFORMATION MANAGEMENT | Facility: CLINIC | Age: 23
End: 2018-06-21

## 2018-06-22 ENCOUNTER — TRANSFERRED RECORDS (OUTPATIENT)
Dept: HEALTH INFORMATION MANAGEMENT | Facility: CLINIC | Age: 23
End: 2018-06-22

## 2018-06-22 DIAGNOSIS — Z21 HUMAN IMMUNODEFICIENCY VIRUS I INFECTION (H): Primary | ICD-10-CM

## 2018-06-22 NOTE — PROGRESS NOTES
Per [facility] Ambulatory Care Protocol, Pt is due for routine labs based on disease state or monitoring of medications. Lab orders entered.  Amber Mackey RN    Pt referred here from [facility].

## 2018-06-25 DIAGNOSIS — Z21 HUMAN IMMUNODEFICIENCY VIRUS I INFECTION (H): ICD-10-CM

## 2018-06-25 LAB
ALBUMIN UR-MCNC: NEGATIVE MG/DL
AMYLASE SERPL-CCNC: 124 U/L (ref 30–110)
APPEARANCE UR: CLEAR
BASOPHILS # BLD AUTO: 0 10E9/L (ref 0–0.2)
BASOPHILS NFR BLD AUTO: 0.7 %
BILIRUB UR QL STRIP: NEGATIVE
COLOR UR AUTO: YELLOW
DIFFERENTIAL METHOD BLD: ABNORMAL
EOSINOPHIL # BLD AUTO: 0.2 10E9/L (ref 0–0.7)
EOSINOPHIL NFR BLD AUTO: 3.4 %
ERYTHROCYTE [DISTWIDTH] IN BLOOD BY AUTOMATED COUNT: 15.5 % (ref 10–15)
GLUCOSE UR STRIP-MCNC: NEGATIVE MG/DL
HBV CORE AB SERPL QL IA: NONREACTIVE
HCT VFR BLD AUTO: 47.3 % (ref 40–53)
HGB BLD-MCNC: 14.2 G/DL (ref 13.3–17.7)
HGB UR QL STRIP: NEGATIVE
IMM GRANULOCYTES # BLD: 0 10E9/L (ref 0–0.4)
IMM GRANULOCYTES NFR BLD: 0.3 %
KETONES UR STRIP-MCNC: NEGATIVE MG/DL
LEUKOCYTE ESTERASE UR QL STRIP: NEGATIVE
LIPASE SERPL-CCNC: 160 U/L (ref 73–393)
LYMPHOCYTES # BLD AUTO: 2.8 10E9/L (ref 0.8–5.3)
LYMPHOCYTES NFR BLD AUTO: 45.3 %
MCH RBC QN AUTO: 22.2 PG (ref 26.5–33)
MCHC RBC AUTO-ENTMCNC: 30 G/DL (ref 31.5–36.5)
MCV RBC AUTO: 74 FL (ref 78–100)
MONOCYTES # BLD AUTO: 0.7 10E9/L (ref 0–1.3)
MONOCYTES NFR BLD AUTO: 10.6 %
MUCOUS THREADS #/AREA URNS LPF: PRESENT /LPF
NEUTROPHILS # BLD AUTO: 2.4 10E9/L (ref 1.6–8.3)
NEUTROPHILS NFR BLD AUTO: 39.7 %
NITRATE UR QL: NEGATIVE
NRBC # BLD AUTO: 0 10*3/UL
NRBC BLD AUTO-RTO: 0 /100
PH UR STRIP: 6 PH (ref 5–7)
PLATELET # BLD AUTO: 300 10E9/L (ref 150–450)
RBC # BLD AUTO: 6.4 10E12/L (ref 4.4–5.9)
RBC #/AREA URNS AUTO: 0 /HPF (ref 0–2)
SOURCE: ABNORMAL
SP GR UR STRIP: 1.02 (ref 1–1.03)
UROBILINOGEN UR STRIP-MCNC: 0 MG/DL (ref 0–2)
WBC # BLD AUTO: 6.1 10E9/L (ref 4–11)
WBC #/AREA URNS AUTO: 0 /HPF (ref 0–5)

## 2018-06-26 LAB
CD3 CELLS # BLD: 2361 CELLS/UL (ref 603–2990)
CD3 CELLS NFR BLD: 77 % (ref 49–84)
CD3+CD4+ CELLS # BLD: 783 CELLS/UL (ref 441–2156)
CD3+CD4+ CELLS NFR BLD: 25 % (ref 28–63)
CD3+CD4+ CELLS/CD3+CD8+ CLL BLD: 0.51 % (ref 1.4–2.6)
CD3+CD8+ CELLS # BLD: 1508 CELLS/UL (ref 125–1312)
CD3+CD8+ CELLS NFR BLD: 49 % (ref 10–40)
CMV IGG SERPL QL IA: >8 AI (ref 0–0.8)
IFC SPECIMEN: ABNORMAL
T GONDII IGG SER QL: <3 IU/ML (ref 0–7.1)
T PALLIDUM AB SER QL: NONREACTIVE

## 2018-06-27 ENCOUNTER — OFFICE VISIT (OUTPATIENT)
Dept: INFECTIOUS DISEASES | Facility: CLINIC | Age: 23
End: 2018-06-27
Attending: INTERNAL MEDICINE
Payer: COMMERCIAL

## 2018-06-27 ENCOUNTER — OFFICE VISIT (OUTPATIENT)
Dept: PHARMACY | Facility: CLINIC | Age: 23
End: 2018-06-27
Payer: COMMERCIAL

## 2018-06-27 ENCOUNTER — RESULTS ONLY (OUTPATIENT)
Dept: OTHER | Facility: CLINIC | Age: 23
End: 2018-06-27

## 2018-06-27 VITALS
OXYGEN SATURATION: 95 % | BODY MASS INDEX: 29.1 KG/M2 | TEMPERATURE: 98.3 F | DIASTOLIC BLOOD PRESSURE: 75 MMHG | SYSTOLIC BLOOD PRESSURE: 128 MMHG | WEIGHT: 207.9 LBS | HEIGHT: 71 IN | HEART RATE: 81 BPM

## 2018-06-27 DIAGNOSIS — Z21 HUMAN IMMUNODEFICIENCY VIRUS I INFECTION (H): Primary | ICD-10-CM

## 2018-06-27 DIAGNOSIS — B20: ICD-10-CM

## 2018-06-27 DIAGNOSIS — R71.8 RBC MICROCYTOSIS: ICD-10-CM

## 2018-06-27 DIAGNOSIS — Z21 HUMAN IMMUNODEFICIENCY VIRUS I INFECTION (H): ICD-10-CM

## 2018-06-27 LAB
ALBUMIN SERPL-MCNC: 4.1 G/DL (ref 3.4–5)
ALP SERPL-CCNC: 62 U/L (ref 40–150)
ALT SERPL W P-5'-P-CCNC: 36 U/L (ref 0–70)
ANION GAP SERPL CALCULATED.3IONS-SCNC: 7 MMOL/L (ref 3–14)
AST SERPL W P-5'-P-CCNC: 39 U/L (ref 0–45)
BILIRUB SERPL-MCNC: 0.4 MG/DL (ref 0.2–1.3)
BUN SERPL-MCNC: 26 MG/DL (ref 7–30)
CALCIUM SERPL-MCNC: 9 MG/DL (ref 8.5–10.1)
CHLORIDE SERPL-SCNC: 106 MMOL/L (ref 94–109)
CO2 SERPL-SCNC: 26 MMOL/L (ref 20–32)
CREAT SERPL-MCNC: 1.15 MG/DL (ref 0.66–1.25)
FERRITIN SERPL-MCNC: 80 NG/ML (ref 26–388)
GFR SERPL CREATININE-BSD FRML MDRD: 78 ML/MIN/1.7M2
GLUCOSE SERPL-MCNC: 73 MG/DL (ref 70–99)
IRON SATN MFR SERPL: 39 % (ref 15–46)
IRON SERPL-MCNC: 142 UG/DL (ref 35–180)
POTASSIUM SERPL-SCNC: 3.8 MMOL/L (ref 3.4–5.3)
PROT SERPL-MCNC: 8.3 G/DL (ref 6.8–8.8)
SODIUM SERPL-SCNC: 139 MMOL/L (ref 133–144)
TIBC SERPL-MCNC: 360 UG/DL (ref 240–430)
TRANSFERRIN SERPL-MCNC: 296 MG/DL (ref 210–360)

## 2018-06-27 PROCEDURE — 87900 PHENOTYPE INFECT AGENT DRUG: CPT | Performed by: INTERNAL MEDICINE

## 2018-06-27 PROCEDURE — 84999 UNLISTED CHEMISTRY PROCEDURE: CPT | Mod: ZF

## 2018-06-27 PROCEDURE — 86480 TB TEST CELL IMMUN MEASURE: CPT | Performed by: INTERNAL MEDICINE

## 2018-06-27 PROCEDURE — 84403 ASSAY OF TOTAL TESTOSTERONE: CPT | Performed by: INTERNAL MEDICINE

## 2018-06-27 PROCEDURE — 81381 HLA I TYPING 1 ALLELE HR: CPT | Performed by: INTERNAL MEDICINE

## 2018-06-27 PROCEDURE — 83550 IRON BINDING TEST: CPT | Performed by: INTERNAL MEDICINE

## 2018-06-27 PROCEDURE — 84270 ASSAY OF SEX HORMONE GLOBUL: CPT | Performed by: INTERNAL MEDICINE

## 2018-06-27 PROCEDURE — 84466 ASSAY OF TRANSFERRIN: CPT | Performed by: INTERNAL MEDICINE

## 2018-06-27 PROCEDURE — 87340 HEPATITIS B SURFACE AG IA: CPT | Performed by: INTERNAL MEDICINE

## 2018-06-27 PROCEDURE — 99207 ZZC NO CHARGE LOS: CPT | Performed by: PHARMACIST

## 2018-06-27 PROCEDURE — 86708 HEPATITIS A ANTIBODY: CPT | Performed by: INTERNAL MEDICINE

## 2018-06-27 PROCEDURE — G0463 HOSPITAL OUTPT CLINIC VISIT: HCPCS | Mod: ZF

## 2018-06-27 PROCEDURE — 80053 COMPREHEN METABOLIC PANEL: CPT | Performed by: INTERNAL MEDICINE

## 2018-06-27 PROCEDURE — 87901 NFCT AGT GNTYP ALYS HIV1 REV: CPT | Mod: ZF

## 2018-06-27 PROCEDURE — 36415 COLL VENOUS BLD VENIPUNCTURE: CPT | Performed by: INTERNAL MEDICINE

## 2018-06-27 PROCEDURE — 82728 ASSAY OF FERRITIN: CPT | Performed by: INTERNAL MEDICINE

## 2018-06-27 PROCEDURE — 87901 NFCT AGT GNTYP ALYS HIV1 REV: CPT | Performed by: INTERNAL MEDICINE

## 2018-06-27 PROCEDURE — 86803 HEPATITIS C AB TEST: CPT | Performed by: INTERNAL MEDICINE

## 2018-06-27 PROCEDURE — 84999 UNLISTED CHEMISTRY PROCEDURE: CPT | Performed by: INTERNAL MEDICINE

## 2018-06-27 PROCEDURE — 82955 ASSAY OF G6PD ENZYME: CPT | Performed by: INTERNAL MEDICINE

## 2018-06-27 PROCEDURE — 86706 HEP B SURFACE ANTIBODY: CPT | Performed by: INTERNAL MEDICINE

## 2018-06-27 PROCEDURE — 87903 PHENOTYPE DNA HIV W/CULTURE: CPT | Performed by: INTERNAL MEDICINE

## 2018-06-27 PROCEDURE — 83540 ASSAY OF IRON: CPT | Performed by: INTERNAL MEDICINE

## 2018-06-27 PROCEDURE — 87904 PHENOTYPE DNA HIV W/CLT ADD: CPT | Performed by: INTERNAL MEDICINE

## 2018-06-27 ASSESSMENT — PAIN SCALES - GENERAL: PAINLEVEL: NO PAIN (0)

## 2018-06-27 NOTE — MR AVS SNAPSHOT
"              After Visit Summary   6/27/2018    Iker Berrios    MRN: [id number]           Patient Information     Date Of Birth          1995        Visit Information        Provider Department      6/27/2018 1:30 PM Maribel Shoemaker RPH [facility] and Infectious Diseases [facility]        Today's Diagnoses     Human immunodeficiency virus I infection (H)    -  1       Follow-ups after your visit        Your next 10 appointments already scheduled     Aug 01, 2018  1:30 PM CDT   (Arrive by 1:15 PM)   Return Visit with Marci Rose DO   [facility] and Infectious Diseases ([facility])    [address]  [address]  [facility] [address] [phone]              Who to contact     If you have questions or need follow up information about today's clinic visit or your schedule please contact [facility] AND INFECTIOUS DISEASES [facility] directly at [phone].  Normal or non-critical lab and imaging results will be communicated to you by MyChart, letter or phone within 4 business days after the clinic has received the results. If you do not hear from us within 7 days, please contact the clinic through [employer] or phone. If you have a critical or abnormal lab result, we will notify you by phone as soon as possible.  Submit refill requests through [employer] or call your pharmacy and they will forward the refill request to us. Please allow 3 business days for your refill to be completed.          Additional Information About Your Visit        [employer] Information     [employer] lets you send messages to your doctor, view your test results, renew your prescriptions, schedule appointments and more. To sign up, go to [email or web address] . Click on \"Log in\" on the left side of the screen, which will take you to the Welcome page. Then click on \"Sign up Now\" on the right side of the page.     You will be asked to enter the access code listed below, as well " as some personal information. Please follow the directions to create your username and password.     Your access code is: [email or web address]  Expires: 2018  6:30 AM     Your access code will  in 90 days. If you need help or a new code, please call your [place] clinic or [phone].        Care EveryWhere ID     This is your Care EveryWhere ID. This could be used by other organizations to access your [place] medical records  [id number]         Blood Pressure from Last 3 Encounters:   18 128/75   18 147/76   17 139/82    Weight from Last 3 Encounters:   18 207 lb 14.4 oz (94.3 kg)   18 217 lb 1.6 oz (98.5 kg)   17 213 lb (96.6 kg)              Today, you had the following     No orders found for display       Primary Care Provider Fax #    Physician No Ref-Primary [phone]       No address on file        Equal Access to Services     MATTHEW ADAMS : Hadii reagan ku hadasho Soomaali, waaxda luqadaha, qaybta kaalmada adeegyada, kaur delaney . So [facility] [phone].    ATENCIÓN: Si habla español, tiene a massey disposición servicios gratuitos de asistencia lingüística. Llame al [phone].    We comply with applicable federal civil rights laws and [place] laws. We do not discriminate on the basis of race, color, national origin, age, disability, sex, sexual orientation, or gender identity.            Thank you!     Thank you for choosing [facility] AND INFECTIOUS DISEASES [facility]  for your care. Our goal is always to provide you with excellent care. Hearing back from our patients is one way we can continue to improve our services. Please take a few minutes to complete the written survey that you may receive in the mail after your visit with us. Thank you!             Your Updated Medication List - Protect others around you: Learn how to safely use, store and throw away your medicines at www.disposemymeds.org.          This list is accurate  as of 6/27/18 11:59 PM.  Always use your most recent med list.                   Brand Name Dispense Instructions for use Diagnosis    loperamide 2 MG tablet    IMODIUM A-D    30 tablet    Take 2 tabs (4 mg) after first loose stool, and then take one tab (2 mg) after each diarrheal stool.  Max of 8 tabs (16 mg) per day.    Diarrhea, unspecified type

## 2018-06-27 NOTE — PROGRESS NOTES
[facility]  Infectious Disease Clinic Note     Patient:  Iker Berrios, Date of birth 1995, Medical record number [id number]  Date of Visit:  06/27/2018         Assessment and Plan:   Plan:  1. Routine HIV labs  2. Currently does not have medication insurance, but plan to start Triumeq   3. Follow up in one month    Assessment:  Iker Berrios is a 21 y/o who presents to establish care. Found to be HIV seropositive on 6/19/2018.     Patient discussed with Dr. Colin Rose, Infectious Diseases Fellow  [phone]        History of the Infectious Disease Illness:     Iker Berrios is a 21 y/o who presents to [facility]. He had been on Truvada for PrEP from 2310-5803. Tested HIV negative coming off of Truvada. He was noted to have a viral illness in March 2018 that last about 2 weeks. He had presented to [place] with one week of fevers, sore throat, myalgias. He had been tested for Strep throat and found to be negative.     Diagnosed HIV positive: 6/19/2018 at [facility]   Viral load at diagnosis: 2654  CD4 count at diagnosis: 783  ART start date: Needs insurance coverage to start medications   HIV Genotype: Pending   OI's: None   STI: 6/19/2018 GC/Chlamydia, 6/27/2018 RPR Negative     Serology: Hepatitis A Reactive, Hepatitis B surface Antigen Non reactive, Hepatitis C antibody Non reactive   TB Quant Negative  CMV Positive  Toxo Negative    Past Medical History:   None    Social History:   No smoking   No alcohol   No illicit drugs  Sexual preference: MSM, non consistent condom use.    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 to [employer] for three years  Currently enrolled at the [employer] majoring in psychology       Family History:   Non-contributory     Surgical History:   Right femur fracture and repair in [place]      Immunization History   Administered Date(s) Administered     DTAP (<7y) 07/14/1999     HepB 02/01/1994, 04/07/1994, 11/01/1994  "    Historical HIB 02/01/1994, 04/07/1994, 06/14/1994, 03/14/1995     MMR 03/14/1995, 07/14/1999     Meningococcal (Menomune ) 01/17/2006     OPV, trivalent, live 02/01/1994, 04/07/1994, 03/14/1995, 07/14/1999     TDAP Vaccine (Adacel) 01/17/2006          Review of Systems:   CONSTITUTIONAL:  No fevers or chills  EYES: negative for icterus  ENT:  negative for hearing loss, tinnitus and sore throat  RESPIRATORY:  negative for cough with sputum and dyspnea  CARDIOVASCULAR:  negative for chest pain, dyspnea  GASTROINTESTINAL:  negative for nausea, vomiting, diarrhea and constipation  GENITOURINARY:  negative for dysuria  HEME:  No easy bruising  INTEGUMENT:  negative for rash and pruritus  NEURO:  Negative for headache         Current Medications (antimicrobials listed in bold):     Current Outpatient Prescriptions   Medication Sig Dispense Refill     loperamide (IMODIUM A-D) 2 MG tablet Take 2 tabs (4 mg) after first loose stool, and then take one tab (2 mg) after each diarrheal stool.  Max of 8 tabs (16 mg) per day. (Patient not taking: Reported on 6/27/2018) 30 tablet 0          Allergies:   No Known Allergies       Physical Exam:   Vitals were reviewed.  All vitals stable  /75  Pulse 81  Temp 98.3  F (36.8  C) (Oral)  Ht 1.791 m (5' 10.5\")  Wt 94.3 kg (207 lb 14.4 oz)  SpO2 95%  BMI 29.41 kg/m2  Exam:  GENERAL:  well-developed, well-nourished, alert, oriented, in no acute distress.  HEENT:  Head is normocephalic, atraumatic     EYES:  Eyes have anicteric sclerae.    ENT:  Oropharynx is moist without exudates or ulcers.  NECK:  Supple.  LUNGS:  Clear to auscultation.  CARDIOVASCULAR:  Regular rate and rhythm with no murmurs, gallops or rubs.  ABDOMEN:  Normal bowel sounds, soft, nontender.  SKIN:  No acute rashes.    NEUROLOGIC:  Grossly nonfocal.         Laboratory Data:     CD4 counts    Absolute CD4   Date Value Ref Range Status   06/25/2018 783 441 - 2156 cells/uL Final     Metabolic Studies  "   Recent Labs   Lab Test  06/27/18   1447  03/07/18   1303   NA  139  137   POTASSIUM  3.8  4.1   CHLORIDE  106  104   CO2  26  22   ANIONGAP  7  11   BUN  26  17   CR  1.15  1.57*   GFRESTIMATED  78  54*   GLC  73  88   MARIELY  9.0  8.2*     Hepatic Studies    Recent Labs   Lab Test  06/27/18   1447  03/07/18   1303   BILITOTAL  0.4  0.2   ALKPHOS  62  57   ALBUMIN  4.1  3.7   AST  39  63*   ALT  36  43     Pancreatitis testing    Recent Labs   Lab Test  06/25/18   1415   AMYLASE  124*   LIPASE  160     Hematology Studies    Recent Labs   Lab Test  06/25/18   1415  03/07/18   1303   WBC  6.1  5.3   ANEU  2.4  3.9   ALYM  2.8  1.1   JANAE  0.7  0.3   AEOS  0.2  0.0   HGB  14.2  14.5   HCT  47.3  44.6   MCV  74*  70*   PLT  300  148*

## 2018-06-27 NOTE — NURSING NOTE
"Chief Complaint   Patient presents with     Consult     b20     /75  Pulse 81  Temp 98.3  F (36.8  C) (Oral)  Ht 1.791 m (5' 10.5\")  Wt 94.3 kg (207 lb 14.4 oz)  SpO2 95%  BMI 29.41 kg/m2  Teresa Ku, BILLIE    "

## 2018-06-27 NOTE — MR AVS SNAPSHOT
"              After Visit Summary   6/27/2018    Iker Berrios    MRN: [id number]           Patient Information     Date Of Birth          1995        Visit Information        Provider Department      6/27/2018 12:30 PM Marci Rose DO M [facility] and Infectious Diseases        Today's Diagnoses     Human immunodeficiency virus I infection (H)    -  1    RBC microcytosis           Follow-ups after your visit        Follow-up notes from your care team     Return in about 1 month (around 7/27/2018).      Your next 10 appointments already scheduled     Aug 01, 2018  1:30 PM CDT   (Arrive by 1:15 PM)   Return Visit with DO GEORGE Clifford [facility] and Infectious Diseases ([facility] [place])    [address]  [address]  [facility] [address] [phone]              Who to contact     If you have questions or need follow up information about today's clinic visit or your schedule please contact [facility] AND INFECTIOUS DISEASES directly at [phone].  Normal or non-critical lab and imaging results will be communicated to you by FindTheBesthart, letter or phone within 4 business days after the clinic has received the results. If you do not hear from us within 7 days, please contact the clinic through FindTheBesthart or phone. If you have a critical or abnormal lab result, we will notify you by phone as soon as possible.  Submit refill requests through CyberArts or call your pharmacy and they will forward the refill request to us. Please allow 3 business days for your refill to be completed.          Additional Information About Your Visit        FindTheBesthart Information     [employer]berArts lets you send messages to your doctor, view your test results, renew your prescriptions, schedule appointments and more. To sign up, go to [email or web address] . Click on \"Log in\" on the left side of the screen, which will take you to the Welcome page. Then click on \"Sign up " "Now\" on the right side of the page.     You will be asked to enter the access code listed below, as well as some personal information. Please follow the directions to create your username and password.     Your access code is: [email or web address]  Expires: 2018  6:30 AM     Your access code will  in 90 days. If you need help or a new code, please call your [place] clinic or [phone].        Care EveryWhere ID     This is your Care EveryWhere ID. This could be used by other organizations to access your [place] medical records  [id number]        Your Vitals Were     Pulse Temperature Height Pulse Oximetry BMI (Body Mass Index)       81 98.3  F (36.8  C) (Oral) 1.791 m (5' 10.5\") 95% 29.41 kg/m2        Blood Pressure from Last 3 Encounters:   18 128/75   18 147/76   17 139/82    Weight from Last 3 Encounters:   18 94.3 kg (207 lb 14.4 oz)   18 98.5 kg (217 lb 1.6 oz)   17 96.6 kg (213 lb)              We Performed the Following     Glucose 6 phosphate dehydrogenase     HIV phenotyping with genotype     HLA Single Antigen [place] Type        Primary Care Provider Fax #    Physician No Ref-Primary [phone]       No address on file        Equal Access to Services     MATTHEW ADAMS : Hadmark Adamson, waaxda taya, qaybta kaalkaur clancy . So [facility] [phone].    ATENCIÓN: Si habla español, tiene a massey disposición servicios gratuitos de asistencia lingüística. Llame al [phone].    We comply with applicable federal civil rights laws and [place] laws. We do not discriminate on the basis of race, color, national origin, age, disability, sex, sexual orientation, or gender identity.            Thank you!     Thank you for choosing [facility] AND INFECTIOUS DISEASES  for your care. Our goal is always to provide you with excellent care. Hearing back from our patients is one way we can continue to " improve our services. Please take a few minutes to complete the written survey that you may receive in the mail after your visit with us. Thank you!             Your Updated Medication List - Protect others around you: Learn how to safely use, store and throw away your medicines at www.disposemymeds.org.          This list is accurate as of 6/27/18  3:31 PM.  Always use your most recent med list.                   Brand Name Dispense Instructions for use Diagnosis    loperamide 2 MG tablet    IMODIUM A-D    30 tablet    Take 2 tabs (4 mg) after first loose stool, and then take one tab (2 mg) after each diarrheal stool.  Max of 8 tabs (16 mg) per day.    Diarrhea, unspecified type

## 2018-06-28 LAB
HAV IGG SER QL IA: REACTIVE
HBV SURFACE AB SERPL IA-ACNC: >1000 M[IU]/ML
HBV SURFACE AG SERPL QL IA: NONREACTIVE
HCV AB SERPL QL IA: NONREACTIVE
HLA SINGLE ANTIGEN ALLELE TYPE: NORMAL

## 2018-06-29 LAB
G6PD RBC-CCNC: 16 U/G HB (ref 9.9–16.6)
HIV PHENOTYPE: NORMAL
HIV1 RNA # PLAS NAA DL=20: 2654 {COPIES}/ML
HIV1 RNA SERPL NAA+PROBE-LOG#: 3.4 {LOG_COPIES}/ML
M TB TUBERC IFN-G BLD QL: NEGATIVE
M TB TUBERC IFN-G/MITOGEN IGNF BLD: 0 IU/ML
SHBG SERPL-SCNC: 28 NMOL/L (ref 11–80)
TESTOST FREE SERPL-MCNC: 16.5 NG/DL (ref 4.7–24.4)
TESTOST SERPL-MCNC: 681 NG/DL (ref 240–950)

## 2018-06-29 NOTE — PROGRESS NOTES
Clinical Consult:                                                    Iker Berrios is a 22 year old male coming in for a clinical pharmacist consult.  He was referred to me from Dr. Rose.     Reason for Consult: New medication start with Triumeq     Discussion: Pt reports he is ready to start therapy by taking 1 pill 1 daily.  Reports he understands the importance of medication adherence and will do his very best to take medication every single day with no missed doses.  He is interested in knowing about possible side effects and when the best time to take his medications.    Plan:  1.  Patient has insurance from [employer], ([employer]) which will not cover office visits, labs or medication from our clinic. Pt filled out Program  paper work. Per Tamra Ventura at the Program  office, this patient is eligible for medical assistance benefits, even though he has [place] benefits.  Tamra reports he needs to answer a few more questions on the form, and submit  his proof of income.  She said once this is done, he will have retroactive medical assistance and will have benefits that go back for for 3 months.  Patient needs to answer question #7 that states he will need retroactive coverage for medical assistance.  2.  I have left 3 messages for this patient to call me.  I left a message asking him to please bring in his proof of income, and to come in and answer a few more questions on the program H form.  He was asked to call me back when he got my message and he has not done so, as of Friday, June 29.  3. If HLA is negative, pt will start therapy on Triumeq.  We reviewed in detail dosing instructions, possible side effects, and the importance of strict medication adherence.  4. I will try calling patient again on Monday, July 2.    Maribel Shoemaker, [facility] Pharmacist.   [phone]

## 2018-07-03 LAB
HLA TYPE SA METHOD: NORMAL
HLA TYPE SA RESULT: NORMAL

## 2018-07-07 NOTE — PROGRESS NOTES
[facility] Staff Note: Mr. Cope was seen, examined, and the case was discussed with Dr. Rose, ID Fellow -- I agree with her consultative history and examination, assessment and plan in this outpatient ID / HIV Consult note. This note reflects my observations and opinions, and the plan outlined fully reflects my approach. I have reviewed the available history, radiology, laboratory results, and reports with the Fellow.

## 2018-07-09 ENCOUNTER — OFFICE VISIT (OUTPATIENT)
Dept: PHARMACY | Facility: CLINIC | Age: 23
End: 2018-07-09
Payer: COMMERCIAL

## 2018-07-09 DIAGNOSIS — Z21 HUMAN IMMUNODEFICIENCY VIRUS I INFECTION (H): Primary | ICD-10-CM

## 2018-07-09 DIAGNOSIS — Z21 HIV (HUMAN IMMUNODEFICIENCY VIRUS INFECTION) (H): Primary | ICD-10-CM

## 2018-07-09 PROCEDURE — 99207 ZZC NO CHARGE LOS: CPT | Performed by: PHARMACIST

## 2018-07-09 NOTE — PROGRESS NOTES
Clinical Consult:                                                    Iker Berrios is a 22 year old male coming in for a clinical pharmacist consult.  He was referred to me from Dr. Rose.    Reason for Consult: New med start with Triumeq today. (delayed due to insurance difficulties/waiting for HLA test result).    Discussion: Iker reports he will take Triaminic at bedtime.  Reports he understands the importance of strict medication adherence.  He will call with any questions or concerns.    Plan:  1. HLA negative.  2. Will start Triumeq tonight.  3. Reviewed dosing, importance of medication adherence, possible side effects, including rash. He is asked to stop medication and call us if he develops a rash.  4.  Appointment with Dr. Luo was changed to 5 weeks out patient will have labs in 4 weeks.  5. Our pharmacy will call him in 3 weeks to refill medication.   6. Pt was supplied with a bed box and key chain pill chin.     He is asked to call with any questions or concerns.    Maribel Shoemaker, [facility] Pharmacist.   [phone]

## 2018-07-09 NOTE — PROGRESS NOTES
Per [facility] Ambulatory Care Protocol, Pt is due for routine labs based on disease state or monitoring of medications. Lab orders entered.  Sherie Yates RN'

## 2018-07-09 NOTE — MR AVS SNAPSHOT
After Visit Summary   7/9/2018    Iker Berrios    MRN: [id number]           Patient Information     Date Of Birth          1995        Visit Information        Provider Department      7/9/2018 12:00 PM Maribel Shoemaker RPH [facility] and Infectious Diseases [facility]        Today's Diagnoses     Human immunodeficiency virus I infection (H)    -  1       Follow-ups after your visit        Your next 10 appointments already scheduled     Aug 08, 2018 12:30 PM CDT   LAB with  LAB   [facility])    [address] [place] [address]  1st Floor  [facility] [id number] [phone]           Please do not eat 10-12 hours before your appointment if you are coming in fasting for labs on lipids, cholesterol, or glucose (sugar). This does not apply to pregnant women. Water, hot tea and black coffee (with nothing added) are okay. Do not drink other fluids, diet soda or chew gum.            Aug 15, 2018  1:00 PM CDT   (Arrive by 12:45 PM)   Return Visit with Marci Rose DO   [facility] and Infectious Diseases ([facility])    [address] [place] [address]  [address]  [facility] [address] [phone]              Who to contact     If you have questions or need follow up information about today's clinic visit or your schedule please contact [facility] AND INFECTIOUS DISEASES [facility] directly at [phone].  Normal or non-critical lab and imaging results will be communicated to you by MyChart, letter or phone within 4 business days after the clinic has received the results. If you do not hear from us within 7 days, please contact the clinic through MyChart or phone. If you have a critical or abnormal lab result, we will notify you by phone as soon as possible.  Submit refill requests through [employer] or call your pharmacy and they will forward the refill request to us. Please allow 3 business days for your refill to be  completed.          Additional Information About Your Visit        MyChart Information     [employer] gives you secure access to your electronic health record. If you see a primary care provider, you can also send messages to your care team and make appointments. If you have questions, please call your primary care clinic.  If you do not have a primary care provider, please call [phone] and they will assist you.        Care EveryWhere ID     This is your Care EveryWhere ID. This could be used by other organizations to access your [place] medical records  [id number]         Blood Pressure from Last 3 Encounters:   06/27/18 128/75   03/07/18 147/76   11/01/17 139/82    Weight from Last 3 Encounters:   06/27/18 94.3 kg (207 lb 14.4 oz)   03/07/18 98.5 kg (217 lb 1.6 oz)   11/01/17 96.6 kg (213 lb)              Today, you had the following     No orders found for display         Today's Medication Changes          These changes are accurate as of 7/9/18  1:10 PM.  If you have any questions, ask your nurse or doctor.               Start taking these medicines.        Dose/Directions    abacavir-dolutegravir-LamiVUDine 600- MG per tablet   Commonly known as:  TRIUMEQ   Used for:  Human immunodeficiency virus I infection (H)        Dose:  1 tablet   Take 1 tablet by mouth daily   Quantity:  30 tablet   Refills:  11            Where to get your medicines      These medications were sent to [facility] [address] [address]    Hours:  TRANSPLANT PHONE NUMBER [phone] Phone:  [phone]     abacavir-dolutegravir-LamiVUDine 600- MG per tablet                Primary Care Provider Fax #    Physician No Ref-Primary [phone]       No address on file        Equal Access to Services     MATTHEW ADAMS : Dai Adamson, justine bain, kaur olivares  latiana arrington. So [facility] [phone].    ATENCIÓN: Si habla nely, tiene a massey disposición servicios gratuitos de asistencia lingüística. Emil al [phone].    We comply with applicable federal civil rights laws and [place] laws. We do not discriminate on the basis of race, color, national origin, age, disability, sex, sexual orientation, or gender identity.            Thank you!     Thank you for choosing [facility] AND INFECTIOUS DISEASES [facility]  for your care. Our goal is always to provide you with excellent care. Hearing back from our patients is one way we can continue to improve our services. Please take a few minutes to complete the written survey that you may receive in the mail after your visit with us. Thank you!             Your Updated Medication List - Protect others around you: Learn how to safely use, store and throw away your medicines at www.disposemymeds.org.          This list is accurate as of 7/9/18  1:10 PM.  Always use your most recent med list.                   Brand Name Dispense Instructions for use Diagnosis    abacavir-dolutegravir-LamiVUDine 600- MG per tablet    TRIUMEQ    30 tablet    Take 1 tablet by mouth daily    Human immunodeficiency virus I infection (H)       loperamide 2 MG tablet    IMODIUM A-D    30 tablet    Take 2 tabs (4 mg) after first loose stool, and then take one tab (2 mg) after each diarrheal stool.  Max of 8 tabs (16 mg) per day.    Diarrhea, unspecified type

## 2018-07-27 LAB — LAB SCANNED RESULT: NORMAL

## 2018-08-06 ENCOUNTER — TELEPHONE (OUTPATIENT)
Dept: PHARMACY | Facility: CLINIC | Age: 23
End: 2018-08-06

## 2018-08-06 NOTE — TELEPHONE ENCOUNTER
Iker called to report he would like to refill Triumeq. Reports no missed doses since starting medication one month ago. Reports no known side effects. Reports he will  medication tomorrow, and knows he has an appt on 08/15. He will come in tomorrow to do labs.    Maribel Shoemaker, [facility] Pharmacist.   [phone]    Refill reminder: 08/27

## 2018-08-07 DIAGNOSIS — Z21 HIV (HUMAN IMMUNODEFICIENCY VIRUS INFECTION) (H): ICD-10-CM

## 2018-08-07 LAB
ALBUMIN SERPL-MCNC: 4 G/DL (ref 3.4–5)
ALP SERPL-CCNC: 82 U/L (ref 40–150)
ALT SERPL W P-5'-P-CCNC: 33 U/L (ref 0–70)
ANION GAP SERPL CALCULATED.3IONS-SCNC: 7 MMOL/L (ref 3–14)
AST SERPL W P-5'-P-CCNC: 29 U/L (ref 0–45)
BASOPHILS # BLD AUTO: 0 10E9/L (ref 0–0.2)
BASOPHILS NFR BLD AUTO: 0.6 %
BILIRUB SERPL-MCNC: 0.3 MG/DL (ref 0.2–1.3)
BUN SERPL-MCNC: 18 MG/DL (ref 7–30)
CALCIUM SERPL-MCNC: 9.4 MG/DL (ref 8.5–10.1)
CD3 CELLS # BLD: 987 CELLS/UL (ref 603–2990)
CD3 CELLS NFR BLD: 71 % (ref 49–84)
CD3+CD4+ CELLS # BLD: 493 CELLS/UL (ref 441–2156)
CD3+CD4+ CELLS NFR BLD: 36 % (ref 28–63)
CD3+CD4+ CELLS/CD3+CD8+ CLL BLD: 1.09 % (ref 1.4–2.6)
CD3+CD8+ CELLS # BLD: 454 CELLS/UL (ref 125–1312)
CD3+CD8+ CELLS NFR BLD: 33 % (ref 10–40)
CHLORIDE SERPL-SCNC: 103 MMOL/L (ref 94–109)
CO2 SERPL-SCNC: 27 MMOL/L (ref 20–32)
CREAT SERPL-MCNC: 1.54 MG/DL (ref 0.66–1.25)
DIFFERENTIAL METHOD BLD: ABNORMAL
EOSINOPHIL # BLD AUTO: 0.2 10E9/L (ref 0–0.7)
EOSINOPHIL NFR BLD AUTO: 3.1 %
ERYTHROCYTE [DISTWIDTH] IN BLOOD BY AUTOMATED COUNT: 15.7 % (ref 10–15)
GFR SERPL CREATININE-BSD FRML MDRD: 55 ML/MIN/1.7M2
GLUCOSE SERPL-MCNC: 78 MG/DL (ref 70–99)
HCT VFR BLD AUTO: 49.8 % (ref 40–53)
HGB BLD-MCNC: 15 G/DL (ref 13.3–17.7)
IFC SPECIMEN: ABNORMAL
IMM GRANULOCYTES # BLD: 0 10E9/L (ref 0–0.4)
IMM GRANULOCYTES NFR BLD: 0.6 %
LYMPHOCYTES # BLD AUTO: 1.2 10E9/L (ref 0.8–5.3)
LYMPHOCYTES NFR BLD AUTO: 22.6 %
MCH RBC QN AUTO: 22.2 PG (ref 26.5–33)
MCHC RBC AUTO-ENTMCNC: 30.1 G/DL (ref 31.5–36.5)
MCV RBC AUTO: 74 FL (ref 78–100)
MONOCYTES # BLD AUTO: 0.9 10E9/L (ref 0–1.3)
MONOCYTES NFR BLD AUTO: 16.3 %
NEUTROPHILS # BLD AUTO: 3.1 10E9/L (ref 1.6–8.3)
NEUTROPHILS NFR BLD AUTO: 56.8 %
NRBC # BLD AUTO: 0 10*3/UL
NRBC BLD AUTO-RTO: 0 /100
PLATELET # BLD AUTO: 274 10E9/L (ref 150–450)
POTASSIUM SERPL-SCNC: 4.1 MMOL/L (ref 3.4–5.3)
PROT SERPL-MCNC: 9.1 G/DL (ref 6.8–8.8)
RBC # BLD AUTO: 6.75 10E12/L (ref 4.4–5.9)
SODIUM SERPL-SCNC: 137 MMOL/L (ref 133–144)
WBC # BLD AUTO: 5.5 10E9/L (ref 4–11)

## 2018-08-10 LAB
HIV1 RNA # PLAS NAA DL=20: 67 {COPIES}/ML
HIV1 RNA SERPL NAA+PROBE-LOG#: 1.8 {LOG_COPIES}/ML

## 2018-08-14 ENCOUNTER — MYC MEDICAL ADVICE (OUTPATIENT)
Dept: INFECTIOUS DISEASES | Facility: CLINIC | Age: 23
End: 2018-08-14

## 2018-08-15 ENCOUNTER — OFFICE VISIT (OUTPATIENT)
Dept: INFECTIOUS DISEASES | Facility: CLINIC | Age: 23
End: 2018-08-15
Attending: INTERNAL MEDICINE
Payer: COMMERCIAL

## 2018-08-15 VITALS
DIASTOLIC BLOOD PRESSURE: 73 MMHG | RESPIRATION RATE: 18 BRPM | SYSTOLIC BLOOD PRESSURE: 132 MMHG | OXYGEN SATURATION: 100 % | TEMPERATURE: 98.1 F | HEART RATE: 85 BPM | BODY MASS INDEX: 29.54 KG/M2 | HEIGHT: 71 IN | WEIGHT: 211 LBS

## 2018-08-15 DIAGNOSIS — Z21 HUMAN IMMUNODEFICIENCY VIRUS I INFECTION (H): Primary | ICD-10-CM

## 2018-08-15 DIAGNOSIS — B36.0 TINEA VERSICOLOR: ICD-10-CM

## 2018-08-15 PROCEDURE — G0463 HOSPITAL OUTPT CLINIC VISIT: HCPCS | Mod: ZF

## 2018-08-15 RX ORDER — CLOTRIMAZOLE 1 %
CREAM (GRAM) TOPICAL 2 TIMES DAILY
Qty: 15 G | Refills: 1 | Status: SHIPPED | OUTPATIENT
Start: 2018-08-15 | End: 2019-01-16

## 2018-08-15 ASSESSMENT — PAIN SCALES - GENERAL: PAINLEVEL: NO PAIN (0)

## 2018-08-15 NOTE — LETTER
8/15/2018      RE: Iker Berrios  [address] [place] [address]       [facility]  Infectious Disease Clinic Note     Patient:  Iker Berrios, Date of birth 1995, Medical record number [id number]  Date of Visit: 8/15/2018         Assessment and Plan:   Plan:  1. Continue Triumeq   2. Repeat HIV labs (Viral load, T cell profile)  3. Follow up in two months   4. Clotrimazole - Tenia versicolor     Assessment:  Iker Berrios is a 21 y/o who presents to [facility] care. Found to be HIV seropositive on 6/19/2018.     Patient discussed with Dr. Colin Rose, Infectious Diseases Fellow  [phone]        History of the Infectious Disease Illness:     Iker Berrios is a 21 y/o who presents for routine follow up. Started on ARVs on 7/09/2018. Denies any medication side-effects. Denies any missed medications. No fevers, chills, night sweats. No cough, shortness of breath. Does have a non-pruritic rash on his neck that he states comes and goes. Otherwise, no complaints.         HIV History:   Diagnosed HIV positive: 6/19/2018 at [facility]   ([facility] for PrEP from 9753-1905. [place]. He was noted to have a viral illness in March 2018 that last about 2 weeks)  Viral load at diagnosis: 2654  CD4 count at diagnosis: 783  ART start date: 7/9/2018  HIV Genotype: 6/07/2018 - No resistance found   OI's: None   STI: 6/19/2018 GC/Chlamydia Negative, 6/27/2018 RPR Negative     Serology:   Hepatitis A Reactive, Hepatitis B surface Antigen Non reactive, Hepatitis C antibody Non reactive   TB Quant Negative  CMV Positive  Toxo Negative  HLA B57:01 Negative     Past Medical History:   None    Social History:   No smoking   No alcohol   No illicit drugs  Sexual preference: MSM, non consistent condom use.    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 to [employer] for three years  Currently enrolled at the [facility] [facility] in  "psychology       Family History:   Non-contributory     Surgical History:   Right femur fracture and repair in [place]      Immunization History   Administered Date(s) Administered     DTAP (<7y) 07/14/1999     HepB 02/01/1994, 04/07/1994, 11/01/1994     Historical HIB 02/01/1994, 04/07/1994, 06/14/1994, 03/14/1995     MMR 03/14/1995, 07/14/1999     Meningococcal (Menomune ) 01/17/2006     OPV, trivalent, live 02/01/1994, 04/07/1994, 03/14/1995, 07/14/1999     TDAP Vaccine ([employer]) 01/17/2006          Review of Systems:   CONSTITUTIONAL:  No fevers or chills  EYES: negative for icterus  ENT:  negative for hearing loss, tinnitus and sore throat  RESPIRATORY:  negative for cough with sputum and dyspnea  CARDIOVASCULAR:  negative for chest pain, dyspnea  GASTROINTESTINAL:  negative for nausea, vomiting, diarrhea and constipation  GENITOURINARY:  negative for dysuria  HEME:  No easy bruising  INTEGUMENT:  negative for rash and pruritus  NEURO:  Negative for headache         Current Medications (antimicrobials listed in bold):     Current Outpatient Prescriptions   Medication Sig Dispense Refill     abacavir-dolutegravir-LamiVUDine (TRIUMEQ) 600- MG per tablet Take 1 tablet by mouth daily 30 tablet 11     clotrimazole (LOTRIMIN) 1 % cream Apply topically 2 times daily 15 g 1          Allergies:   No Known Allergies       Physical Exam:   Vitals were reviewed.  All vitals stable  /73 (BP Location: Right arm, Patient Position: Chair, Cuff Size: Adult Regular)  Pulse 85  Temp 98.1  F (36.7  C) (Oral)  Resp 18  Ht 1.791 m (5' 10.51\")  Wt 95.7 kg (211 lb)  SpO2 100%  BMI 29.84 kg/m2  Exam:  GENERAL:  well-developed, well-nourished, alert, oriented, in no acute distress.  HEENT:  Head is normocephalic, atraumatic     EYES:  Eyes have anicteric sclerae.    ENT:  Oropharynx is moist without exudates or ulcers.  NECK:  Supple.  LUNGS:  Clear to auscultation.  CARDIOVASCULAR:  Regular rate and rhythm with no " murmurs, gallops or rubs.  ABDOMEN:  Normal bowel sounds, soft, nontender.  SKIN:  Hypopigmented rash on his neck, small round, non raised, patches    NEUROLOGIC:  Grossly nonfocal.         Laboratory Data:     CD4 counts    Absolute CD4   Date Value Ref Range Status   08/07/2018 493 441 - 2156 cells/uL Final   06/25/2018 783 441 - 2156 cells/uL Final     Metabolic Studies    Recent Labs   Lab Test  08/07/18   1152  06/27/18   1447  03/07/18   1303   NA  137  139  137   POTASSIUM  4.1  3.8  4.1   CHLORIDE  103  106  104   CO2  27  26  22   ANIONGAP  7  7  11   BUN  18  26  17   CR  1.54*  1.15  1.57*   GFRESTIMATED  55*  78  54*   GLC  78  73  88   MARIELY  9.4  9.0  8.2*     Hepatic Studies    Recent Labs   Lab Test  08/07/18   1152  06/27/18   1447  03/07/18   1303   BILITOTAL  0.3  0.4  0.2   ALKPHOS  82  62  57   ALBUMIN  4.0  4.1  3.7   AST  29  39  63*   ALT  33  36  43     Pancreatitis testing    Recent Labs   Lab Test  06/25/18   1415   AMYLASE  124*   LIPASE  160     Hematology Studies    Recent Labs   Lab Test  08/07/18   1152  06/25/18   1415  03/07/18   1303   WBC  5.5  6.1  5.3   ANEU  3.1  2.4  3.9   ALYM  1.2  2.8  1.1   JANAE  0.9  0.7  0.3   AEOS  0.2  0.2  0.0   HGB  15.0  14.2  14.5   HCT  49.8  47.3  44.6   MCV  74*  74*  70*   PLT  274  300  148*             [facility] Staff Note: Mr. Cope was seen, examined, and the case was discussed with Dr. Rose, ID Fellow -- I agree with her itnerval history and examination, assessment and plan in this outpatient ID / HIV Progress note. This note reflects my observations and opinions, and the plan outlined fully reflects my approach. I have reviewed the available history, radiology, laboratory results, and reports with the Fellow.    Marci Rose DO

## 2018-08-15 NOTE — MR AVS SNAPSHOT
After Visit Summary   8/15/2018    Iker Berrios    MRN: [id number]           Patient Information     Date Of Birth          1995        Visit Information        Provider Department      8/15/2018 1:00 PM Marci Rose DO [facility] and Infectious Diseases        Today's Diagnoses     Human immunodeficiency virus I infection (H)    -  1    Tinea versicolor          Care Instructions    Follow up in two months with blood work a few days prior to appointment.           Follow-ups after your visit        Follow-up notes from your care team     Return in about 2 months (around 10/15/2018).      Your next 10 appointments already scheduled     Oct 17, 2018  1:00 PM CDT   (Arrive by 12:45 PM)   Return Visit with aMrci Rose DO   [facility] and Infectious Diseases ([facility] [place])    [address] [place] [address]  [address]  [facility] [address] [phone]              Who to contact     If you have questions or need follow up information about today's clinic visit or your schedule please contact [facility] AND INFECTIOUS DISEASES directly at [phone].  Normal or non-critical lab and imaging results will be communicated to you by Huupyhart, letter or phone within 4 business days after the clinic has received the results. If you do not hear from us within 7 days, please contact the clinic through Huupyhart or phone. If you have a critical or abnormal lab result, we will notify you by phone as soon as possible.  Submit refill requests through AdTotum or call your pharmacy and they will forward the refill request to us. Please allow 3 business days for your refill to be completed.          Additional Information About Your Visit        MyChart Information     AdTotum gives you secure access to your electronic health record. If you see a primary care provider, you can also send messages to your care team and make appointments. If you have  "questions, please call your primary care clinic.  If you do not have a primary care provider, please call [phone] and they will assist you.        Care EveryWhere ID     This is your Care EveryWhere ID. This could be used by other organizations to access your [place] medical records  [id number]        Your Vitals Were     Pulse Temperature Respirations Height Pulse Oximetry BMI (Body Mass Index)    85 98.1  F (36.7  C) (Oral) 18 1.791 m (5' 10.51\") 100% 29.84 kg/m2       Blood Pressure from Last 3 Encounters:   08/15/18 132/73   06/27/18 128/75   03/07/18 147/76    Weight from Last 3 Encounters:   08/15/18 95.7 kg (211 lb)   06/27/18 94.3 kg (207 lb 14.4 oz)   03/07/18 98.5 kg (217 lb 1.6 oz)              Today, you had the following     No orders found for display         Today's Medication Changes          These changes are accurate as of 8/15/18  1:37 PM.  If you have any questions, ask your nurse or doctor.               Start taking these medicines.        Dose/Directions    clotrimazole 1 % cream   Commonly known as:  LOTRIMIN   Used for:  Tinea versicolor   Started by:  Marci Rose DO        Apply topically 2 times daily   Quantity:  15 g   Refills:  1         Stop taking these medicines if you haven't already. Please contact your care team if you have questions.     loperamide 2 MG tablet   Commonly known as:  IMODIUM A-D   Stopped by:  Marci Rose DO                Where to get your medicines      Some of these will need a paper prescription and others can be bought over the counter.  Ask your nurse if you have questions.     Bring a paper prescription for each of these medications     clotrimazole 1 % cream                Primary Care Provider Fax #    Physician No Ref-Primary [phone]       No address on file        Equal Access to Services     MATTHEW ARRINGTON: Dai Adamson, waflynnda luqadaha, qaybta kaalmada rachael, kaur arrington. So wa " [phone].    ATENCIÓN: Si tevin mckay, tiene a massey disposición servicios gratuitos de asistencia lingüística. Emil saldaña [phone].    We comply with applicable federal civil rights laws and [place] laws. We do not discriminate on the basis of race, color, national origin, age, disability, sex, sexual orientation, or gender identity.            Thank you!     Thank you for choosing [facility] AND INFECTIOUS DISEASES  for your care. Our goal is always to provide you with excellent care. Hearing back from our patients is one way we can continue to improve our services. Please take a few minutes to complete the written survey that you may receive in the mail after your visit with us. Thank you!             Your Updated Medication List - Protect others around you: Learn how to safely use, store and throw away your medicines at www.disposemymeds.org.          This list is accurate as of 8/15/18  1:37 PM.  Always use your most recent med list.                   Brand Name Dispense Instructions for use Diagnosis    abacavir-dolutegravir-LamiVUDine 600- MG per tablet    TRIUMEQ    30 tablet    Take 1 tablet by mouth daily    Human immunodeficiency virus I infection (H)       clotrimazole 1 % cream    LOTRIMIN    15 g    Apply topically 2 times daily    Tinea versicolor

## 2018-08-15 NOTE — PROGRESS NOTES
[facility]  Infectious Disease Clinic Note     Patient:  Iker Berrios, Date of birth 1995, Medical record number [id number]  Date of Visit: 8/15/2018         Assessment and Plan:   Plan:  1. Continue Triumeq   2. Repeat HIV labs (Viral load, T cell profile)  3. Follow up in two months   4. Clotrimazole - Tenia versicolor     Assessment:  Iker Berrios is a 23 y/o who presents to [facility] care. Found to be HIV seropositive on 6/19/2018.     Patient discussed with Dr. Colin Rose, Infectious Diseases Fellow  [phone]        History of the Infectious Disease Illness:     Iker Berrios is a 23 y/o who presents for routine follow up. Started on ARVs on 7/09/2018. Denies any medication side-effects. Denies any missed medications. No fevers, chills, night sweats. No cough, shortness of breath. Does have a non-pruritic rash on his neck that he states comes and goes. Otherwise, no complaints.         HIV History:   Diagnosed HIV positive: 6/19/2018 at [facility]   (Truvada for PrEP from 5712-7425. Tested HIV negative coming off of Truvada. He was noted to have a viral illness in March 2018 that last about 2 weeks)  Viral load at diagnosis: 2654  CD4 count at diagnosis: 783  ART start date: 7/9/2018  HIV Genotype: 6/07/2018 - No resistance found   OI's: None   STI: 6/19/2018 GC/Chlamydia Negative, 6/27/2018 RPR Negative     Serology:   Hepatitis A Reactive, Hepatitis B surface Antigen Non reactive, Hepatitis C antibody Non reactive   TB Quant Negative  CMV Positive  Toxo Negative  HLA B57:01 Negative     Past Medical History:   None    Social History:   No smoking   No alcohol   No illicit drugs  Sexual preference: MSM, non consistent condom use.    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       Family History:   Non-contributory     Surgical History:   Right femur  "fracture and repair in [facility]      Immunization History   Administered Date(s) Administered     DTAP (<7y) 07/14/1999     HepB 02/01/1994, 04/07/1994, 11/01/1994     Historical HIB 02/01/1994, 04/07/1994, 06/14/1994, 03/14/1995     MMR 03/14/1995, 07/14/1999     Meningococcal (Menomune ) 01/17/2006     OPV, trivalent, live 02/01/1994, 04/07/1994, 03/14/1995, 07/14/1999     TDAP Vaccine (Adacel) 01/17/2006          Review of Systems:   CONSTITUTIONAL:  No fevers or chills  EYES: negative for icterus  ENT:  negative for hearing loss, tinnitus and sore throat  RESPIRATORY:  negative for cough with sputum and dyspnea  CARDIOVASCULAR:  negative for chest pain, dyspnea  GASTROINTESTINAL:  negative for nausea, vomiting, diarrhea and constipation  GENITOURINARY:  negative for dysuria  HEME:  No easy bruising  INTEGUMENT:  negative for rash and pruritus  NEURO:  Negative for headache         Current Medications (antimicrobials listed in bold):     Current Outpatient Prescriptions   Medication Sig Dispense Refill     abacavir-dolutegravir-LamiVUDine (TRIUMEQ) 600- MG per tablet Take 1 tablet by mouth daily 30 tablet 11     clotrimazole (LOTRIMIN) 1 % cream Apply topically 2 times daily 15 g 1          Allergies:   No Known Allergies       Physical Exam:   Vitals were reviewed.  All vitals stable  /73 (BP Location: Right arm, Patient Position: Chair, Cuff Size: Adult Regular)  Pulse 85  Temp 98.1  F (36.7  C) (Oral)  Resp 18  Ht 1.791 m (5' 10.51\")  Wt 95.7 kg (211 lb)  SpO2 100%  BMI 29.84 kg/m2  Exam:  GENERAL:  well-developed, well-nourished, alert, oriented, in no acute distress.  HEENT:  Head is normocephalic, atraumatic     EYES:  Eyes have anicteric sclerae.    ENT:  Oropharynx is moist without exudates or ulcers.  NECK:  Supple.  LUNGS:  Clear to auscultation.  CARDIOVASCULAR:  Regular rate and rhythm with no murmurs, gallops or rubs.  ABDOMEN:  Normal bowel sounds, soft, nontender.  SKIN:  " Hypopigmented rash on his neck, small round, non raised, patches    NEUROLOGIC:  Grossly nonfocal.         Laboratory Data:     CD4 counts    Absolute CD4   Date Value Ref Range Status   08/07/2018 493 441 - 2156 cells/uL Final   06/25/2018 783 441 - 2156 cells/uL Final     Metabolic Studies    Recent Labs   Lab Test  08/07/18   1152  06/27/18   1447  03/07/18   1303   NA  137  139  137   POTASSIUM  4.1  3.8  4.1   CHLORIDE  103  106  104   CO2  27  26  22   ANIONGAP  7  7  11   BUN  18  26  17   CR  1.54*  1.15  1.57*   GFRESTIMATED  55*  78  54*   GLC  78  73  88   MARIELY  9.4  9.0  8.2*     Hepatic Studies    Recent Labs   Lab Test  08/07/18   1152  06/27/18   1447  03/07/18   1303   BILITOTAL  0.3  0.4  0.2   ALKPHOS  82  62  57   ALBUMIN  4.0  4.1  3.7   AST  29  39  63*   ALT  33  36  43     Pancreatitis testing    Recent Labs   Lab Test  06/25/18   1415   AMYLASE  124*   LIPASE  160     Hematology Studies    Recent Labs   Lab Test  08/07/18   1152  06/25/18   1415  03/07/18   1303   WBC  5.5  6.1  5.3   ANEU  3.1  2.4  3.9   ALYM  1.2  2.8  1.1   JANAE  0.9  0.7  0.3   AEOS  0.2  0.2  0.0   HGB  15.0  14.2  14.5   HCT  49.8  47.3  44.6   MCV  74*  74*  70*   PLT  274  300  148*

## 2018-09-04 ENCOUNTER — TELEPHONE (OUTPATIENT)
Dept: PHARMACY | Facility: CLINIC | Age: 23
End: 2018-09-04

## 2018-09-13 NOTE — PROGRESS NOTES
[facility] Staff Note: Mr. Cope was seen, examined, and the case was discussed with Dr. Rose, ID Fellow -- I agree with her itnerval history and examination, assessment and plan in this outpatient ID / HIV Progress note. This note reflects my observations and opinions, and the plan outlined fully reflects my approach. I have reviewed the available history, radiology, laboratory results, and reports with the Fellow.

## 2018-09-25 ENCOUNTER — TELEPHONE (OUTPATIENT)
Dept: PHARMACY | Facility: CLINIC | Age: 23
End: 2018-09-25

## 2018-09-25 NOTE — TELEPHONE ENCOUNTER
Attempted to contact the patient to for refill reminder call,  left message on voicemail    Follow-up Date: 10/01

## 2018-10-01 ENCOUNTER — TELEPHONE (OUTPATIENT)
Dept: PHARMACY | Facility: CLINIC | Age: 23
End: 2018-10-01

## 2018-10-01 NOTE — TELEPHONE ENCOUNTER
Called patient for refill reminder last week, and he called back today.  Patient will   One  prescriptions sometime during this week.    2 month of on time refill.    Follow-up Date: 10/30

## 2018-10-12 DIAGNOSIS — Z21 HUMAN IMMUNODEFICIENCY VIRUS I INFECTION (H): ICD-10-CM

## 2018-10-12 LAB
ALBUMIN SERPL-MCNC: 4 G/DL (ref 3.4–5)
ALP SERPL-CCNC: 63 U/L (ref 40–150)
ALT SERPL W P-5'-P-CCNC: 34 U/L (ref 0–70)
ANION GAP SERPL CALCULATED.3IONS-SCNC: 6 MMOL/L (ref 3–14)
AST SERPL W P-5'-P-CCNC: 40 U/L (ref 0–45)
BASOPHILS # BLD AUTO: 0 10E9/L (ref 0–0.2)
BASOPHILS NFR BLD AUTO: 0.4 %
BILIRUB SERPL-MCNC: 0.3 MG/DL (ref 0.2–1.3)
BUN SERPL-MCNC: 27 MG/DL (ref 7–30)
CALCIUM SERPL-MCNC: 8.9 MG/DL (ref 8.5–10.1)
CD3 CELLS # BLD: 1535 CELLS/UL (ref 603–2990)
CD3 CELLS NFR BLD: 65 % (ref 49–84)
CD3+CD4+ CELLS # BLD: 763 CELLS/UL (ref 441–2156)
CD3+CD4+ CELLS NFR BLD: 32 % (ref 28–63)
CD3+CD4+ CELLS/CD3+CD8+ CLL BLD: 1.07 % (ref 1.4–2.6)
CD3+CD8+ CELLS # BLD: 699 CELLS/UL (ref 125–1312)
CD3+CD8+ CELLS NFR BLD: 30 % (ref 10–40)
CHLORIDE SERPL-SCNC: 104 MMOL/L (ref 94–109)
CO2 SERPL-SCNC: 28 MMOL/L (ref 20–32)
CREAT SERPL-MCNC: 1.43 MG/DL (ref 0.66–1.25)
DIFFERENTIAL METHOD BLD: ABNORMAL
EOSINOPHIL # BLD AUTO: 0.1 10E9/L (ref 0–0.7)
EOSINOPHIL NFR BLD AUTO: 1.5 %
ERYTHROCYTE [DISTWIDTH] IN BLOOD BY AUTOMATED COUNT: 17 % (ref 10–15)
GFR SERPL CREATININE-BSD FRML MDRD: 60 ML/MIN/1.7M2
GLUCOSE SERPL-MCNC: 84 MG/DL (ref 70–99)
HCT VFR BLD AUTO: 46.7 % (ref 40–53)
HGB BLD-MCNC: 14.3 G/DL (ref 13.3–17.7)
IFC SPECIMEN: ABNORMAL
IMM GRANULOCYTES # BLD: 0 10E9/L (ref 0–0.4)
IMM GRANULOCYTES NFR BLD: 0.6 %
LYMPHOCYTES # BLD AUTO: 2.1 10E9/L (ref 0.8–5.3)
LYMPHOCYTES NFR BLD AUTO: 39 %
MCH RBC QN AUTO: 23.3 PG (ref 26.5–33)
MCHC RBC AUTO-ENTMCNC: 30.6 G/DL (ref 31.5–36.5)
MCV RBC AUTO: 76 FL (ref 78–100)
MONOCYTES # BLD AUTO: 0.7 10E9/L (ref 0–1.3)
MONOCYTES NFR BLD AUTO: 12.1 %
NEUTROPHILS # BLD AUTO: 2.5 10E9/L (ref 1.6–8.3)
NEUTROPHILS NFR BLD AUTO: 46.4 %
NRBC # BLD AUTO: 0 10*3/UL
NRBC BLD AUTO-RTO: 0 /100
PLATELET # BLD AUTO: 280 10E9/L (ref 150–450)
POTASSIUM SERPL-SCNC: 4.1 MMOL/L (ref 3.4–5.3)
PROT SERPL-MCNC: 8.2 G/DL (ref 6.8–8.8)
RBC # BLD AUTO: 6.14 10E12/L (ref 4.4–5.9)
SODIUM SERPL-SCNC: 138 MMOL/L (ref 133–144)
WBC # BLD AUTO: 5.4 10E9/L (ref 4–11)

## 2018-10-13 LAB
HIV1 RNA # PLAS NAA DL=20: <20 {COPIES}/ML
HIV1 RNA SERPL NAA+PROBE-LOG#: <1.3 {LOG_COPIES}/ML

## 2018-10-15 ASSESSMENT — ENCOUNTER SYMPTOMS
NERVOUS/ANXIOUS: 1
INSOMNIA: 1
DEPRESSION: 1
DECREASED CONCENTRATION: 1
PANIC: 0

## 2018-10-17 ENCOUNTER — OFFICE VISIT (OUTPATIENT)
Dept: INFECTIOUS DISEASES | Facility: CLINIC | Age: 23
End: 2018-10-17
Attending: INTERNAL MEDICINE
Payer: COMMERCIAL

## 2018-10-17 VITALS
SYSTOLIC BLOOD PRESSURE: 112 MMHG | TEMPERATURE: 98.6 F | BODY MASS INDEX: 29.18 KG/M2 | WEIGHT: 208.4 LBS | DIASTOLIC BLOOD PRESSURE: 74 MMHG | OXYGEN SATURATION: 95 % | HEART RATE: 72 BPM | HEIGHT: 71 IN

## 2018-10-17 DIAGNOSIS — Z23 IMMUNIZATION DUE: ICD-10-CM

## 2018-10-17 DIAGNOSIS — R71.8 RBC MICROCYTOSIS: ICD-10-CM

## 2018-10-17 DIAGNOSIS — Z21 HUMAN IMMUNODEFICIENCY VIRUS I INFECTION (H): Primary | ICD-10-CM

## 2018-10-17 PROCEDURE — 90686 IIV4 VACC NO PRSV 0.5 ML IM: CPT | Mod: ZF | Performed by: INTERNAL MEDICINE

## 2018-10-17 PROCEDURE — 25000128 H RX IP 250 OP 636: Mod: ZF | Performed by: INTERNAL MEDICINE

## 2018-10-17 PROCEDURE — G0008 ADMIN INFLUENZA VIRUS VAC: HCPCS | Mod: ZF

## 2018-10-17 PROCEDURE — G0463 HOSPITAL OUTPT CLINIC VISIT: HCPCS | Mod: 25,ZF

## 2018-10-17 RX ADMIN — INFLUENZA A VIRUS A/MICHIGAN/45/2015 X-275 (H1N1) ANTIGEN (FORMALDEHYDE INACTIVATED), INFLUENZA A VIRUS A/SINGAPORE/INFIMH-16-0019/2016 IVR-186 (H3N2) ANTIGEN (FORMALDEHYDE INACTIVATED), INFLUENZA B VIRUS B/PHUKET/3073/2013 ANTIGEN (FORMALDEHYDE INACTIVATED), AND INFLUENZA B VIRUS B/MARYLAND/15/2016 BX-69A ANTIGEN (FORMALDEHYDE INACTIVATED) 0.5 ML: 15; 15; 15; 15 INJECTION, SUSPENSION INTRAMUSCULAR at 14:49

## 2018-10-17 ASSESSMENT — PAIN SCALES - GENERAL: PAINLEVEL: NO PAIN (0)

## 2018-10-17 NOTE — PROGRESS NOTES
[facility]  Infectious Disease Clinic Note     Patient:  Iker Berrios, Date of birth 1995, Medical record number [id number]  Date of Visit: 9/17/2018         Assessment and Plan:   Plan:  1) Influenza vaccination today.   2) Repeat HIV labs prior to next visit.   3) Will start HPV vaccination series at next visit.   4) Declined STI testing today - To be done during next visit.  5) Declined Anal Pap today - To be done at next visit.    Assessment:  Iker Berrios is a 21 y/o who presents for routine HIV care.    Patient discussed with Dr. Colin Rose, Infectious Diseases Fellow  [phone]    History of the Infectious Disease Illness:     Here for routine HIV care. Has been doing well on the Triumeq. No missed doses, no medication side effects. No new symptoms or complaints today.         HIV History:   Diagnosed HIV positive: 6/19/2018 at [facility]   (Truvada for PrEP from 8951-9588. Tested HIV negative coming off of Truvada. He was noted to have a viral illness in March 2018 that last about 2 weeks)  Viral load at diagnosis: 2654  CD4 count at diagnosis: 783  ART start date: 7/9/2018  ARV History: Triumeq   HIV Genotype: 6/07/2018 - No resistance found   OI's: None to date     STI: 6/19/2018 GC/Chlamydia Negative, 6/27/2018 RPR Negative       Serology:   Hepatitis A Reactive, Hepatitis B surface Antigen Non reactive, Hepatitis C antibody Non reactive   TB Quant Negative  CMV Positive  Toxo Negative  HLA B57:01 Negative     Past Medical History:   None    Social History:   No smoking   No alcohol   No illicit drugs  Sexual preference: MSM, both insertive and receptive, nonconsistent condom use.    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            Review of Systems:   CONSTITUTIONAL:  No fevers or chills  EYES: negative for icterus  ENT:  negative for hearing loss,  "tinnitus and sore throat  RESPIRATORY:  negative for cough with sputum and dyspnea  CARDIOVASCULAR:  negative for chest pain, dyspnea  GASTROINTESTINAL:  negative for nausea, vomiting, diarrhea and constipation  GENITOURINARY:  negative for dysuria  HEME:  No easy bruising  INTEGUMENT:  negative for rash and pruritus  NEURO:  Negative for headache         Current Medications (antimicrobials listed in bold):     Current Outpatient Prescriptions   Medication Sig Dispense Refill     abacavir-dolutegravir-LamiVUDine (TRIUMEQ) 600- MG per tablet Take 1 tablet by mouth daily 30 tablet 11     clotrimazole (LOTRIMIN) 1 % cream Apply topically 2 times daily 15 g 1          Allergies:   No Known Allergies       Physical Exam:   Vitals were reviewed.  All vitals stable  /74  Pulse 72  Temp 98.6  F (37  C) (Oral)  Ht 1.791 m (5' 10.5\")  Wt 94.5 kg (208 lb 6.4 oz)  SpO2 95%  BMI 29.48 kg/m2  Exam:  GENERAL:  well-developed, well-nourished, alert, oriented, in no acute distress.  HEENT:  Head is normocephalic, atraumatic     EYES:  Eyes have anicteric sclerae.    LUNGS: Non labored respiration   CARDIOVASCULAR:  Regular rate and rhythm with no murmurs, gallops or rubs.  ABDOMEN:  Normal bowel sounds, soft, nontender.  SKIN:  Hypopigmented rash on his neck, small round, non raised, patches    NEUROLOGIC:  Grossly nonfocal.         Laboratory Data:     CD4 counts    Absolute CD4   Date Value Ref Range Status   10/12/2018 763 441 - 2156 cells/uL Final   08/07/2018 493 441 - 2156 cells/uL Final   06/25/2018 783 441 - 2156 cells/uL Final     Metabolic Studies    Recent Labs   Lab Test  10/12/18   1251  08/07/18   1152  06/27/18   1447  03/07/18   1303   NA  138  137  139  137   POTASSIUM  4.1  4.1  3.8  4.1   CHLORIDE  104  103  106  104   CO2  28  27  26  22   ANIONGAP  6  7  7  11   BUN  27  18  26  17   CR  1.43*  1.54*  1.15  1.57*   GFRESTIMATED  60*  55*  78  54*   GLC  84  78  73  88   MARIELY  8.9  9.4  9.0  8.2* "     Hepatic Studies    Recent Labs   Lab Test  10/12/18   1251  08/07/18   1152  06/27/18   1447  03/07/18   1303   BILITOTAL  0.3  0.3  0.4  0.2   ALKPHOS  63  82  62  57   ALBUMIN  4.0  4.0  4.1  3.7   AST  40  29  39  63*   ALT  34  33  36  43     Pancreatitis testing    Recent Labs   Lab Test  06/25/18   1415   AMYLASE  124*   LIPASE  160     Hematology Studies    Recent Labs   Lab Test  10/12/18   1251  08/07/18   1152  06/25/18   1415  03/07/18   1303   WBC  5.4  5.5  6.1  5.3   ANEU  2.5  3.1  2.4  3.9   ALYM  2.1  1.2  2.8  1.1   JANAE  0.7  0.9  0.7  0.3   AEOS  0.1  0.2  0.2  0.0   HGB  14.3  15.0  14.2  14.5   HCT  46.7  49.8  47.3  44.6   MCV  76*  74*  74*  70*   PLT  280  274  300  148*

## 2018-10-17 NOTE — NURSING NOTE
"Chief Complaint   Patient presents with     RECHECK     B20     /74  Pulse 72  Temp 98.6  F (37  C) (Oral)  Ht 1.791 m (5' 10.5\")  Wt 94.5 kg (208 lb 6.4 oz)  SpO2 95%  BMI 29.48 kg/m2  Teresa Ku CMA    Injectable Influenza Immunization Documentation    1.  Has the patient received the information for the injectable influenza vaccine? YES     2. Is the patient 6 months of age or older? YES     3. Does the patient have any of the following contraindications?         Severe allergy to eggs? No     Severe allergic reaction to previous influenza vaccines? No   Severe allergy to latex? No       History of Guillain-Piseco syndrome? No     Currently have a temperature greater than 100.4F? No        4.  Severely egg allergic patients should have flu vaccine eligibility assessed by an MD, RN, or pharmacist, and those who received flu vaccine should be observed for 15 min by an MD, RN, Pharmacist, Medical Technician, or member of clinic staff.\": YES    5. Latex-allergic patients should be given latex-free influenza vaccine Yes. Please reference the Vaccine latex table to determine if your clinic s product is latex-containing.       Vaccination given by Teresa Ku CMA          "

## 2018-10-17 NOTE — MR AVS SNAPSHOT
After Visit Summary   10/17/2018    Iker Berrios    MRN: [id number]           Patient Information     Date Of Birth          1995        Visit Information        Provider Department      10/17/2018 1:00 PM Marci Rose DO [facility]        Today's Diagnoses     Human immunodeficiency virus I infection (H)    -  1    Immunization due        RBC microcytosis          Care Instructions    Influenza shot received today.   Follow up in one month.           Follow-ups after your visit        Follow-up notes from your care team     Return in about 1 month (around 11/17/2018).      Your next 10 appointments already scheduled     Nov 14, 2018  1:00 PM CST   (Arrive by 12:45 PM)   Return Visit with Marci Rose DO   [facility] ([facility] [place] [facility])    [address] [place] [address]  [address]  [facility] [address] [phone]              Who to contact     If you have questions or need follow up information about today's clinic visit or your schedule please contact [facility] AND INFECTIOUS DISEASES directly at [phone].  Normal or non-critical lab and imaging results will be communicated to you by YumZinghart, letter or phone within 4 business days after the clinic has received the results. If you do not hear from us within 7 days, please contact the clinic through [email or web address] or phone. If you have a critical or abnormal lab result, we will notify you by phone as soon as possible.  Submit refill requests through NGRAIN or call your pharmacy and they will forward the refill request to us. Please allow 3 business days for your refill to be completed.          Additional Information About Your Visit        MyChart Information     NGRAIN gives you secure access to your electronic health record. If you see a primary care provider, you can also send messages to your care team and make appointments. If  "you have questions, please call your primary care clinic.  If you do not have a primary care provider, please call [phone] and they will assist you.        Care EveryWhere ID     This is your Care EveryWhere ID. This could be used by other organizations to access your [place] medical records  [id number]        Your Vitals Were     Pulse Temperature Height Pulse Oximetry BMI (Body Mass Index)       72 98.6  F (37  C) (Oral) 1.791 m (5' 10.5\") 95% 29.48 kg/m2        Blood Pressure from Last 3 Encounters:   10/17/18 112/74   08/15/18 132/73   06/27/18 128/75    Weight from Last 3 Encounters:   10/17/18 94.5 kg (208 lb 6.4 oz)   08/15/18 95.7 kg (211 lb)   06/27/18 94.3 kg (207 lb 14.4 oz)               Primary Care Provider Fax #    Physician No Ref-Primary [phone]       No address on file        Equal Access to Services     MATTHEW ADAMS : Hadii reagan mortensen hadasho Soomaali, waaxda luqadaha, qaybta kaalmada adeegyada, kaur delaney . So [facility] [phone].    ATENCIÓN: Si habla español, tiene a massey disposición servicios gratuitos de asistencia lingüística. Llame al [phone].    We comply with applicable federal civil rights laws and [place] laws. We do not discriminate on the basis of race, color, national origin, age, disability, sex, sexual orientation, or gender identity.            Thank you!     Thank you for choosing [facility] AND INFECTIOUS DISEASES  for your care. Our goal is always to provide you with excellent care. Hearing back from our patients is one way we can continue to improve our services. Please take a few minutes to complete the written survey that you may receive in the mail after your visit with us. Thank you!             Your Updated Medication List - Protect others around you: Learn how to safely use, store and throw away your medicines at www.disposemymeds.org.          This list is accurate as of 10/17/18 11:59 PM.  Always use your most " recent med list.                   Brand Name Dispense Instructions for use Diagnosis    abacavir-dolutegravir-LamiVUDine 600- MG per tablet    TRIUMEQ    30 tablet    Take 1 tablet by mouth daily    Human immunodeficiency virus I infection (H)       clotrimazole 1 % cream    LOTRIMIN    15 g    Apply topically 2 times daily    Tinea versicolor

## 2018-10-29 NOTE — PROGRESS NOTES
[facility] Staff Note: Mr. Cope was seen, examined, and the case was discussed with Dr. Rose, ID Fellow -- I agree with her itnerval history and examination, assessment and plan in this outpatient ID / HIV Progress note. This note reflects my observations and opinions, and the plan outlined fully reflects my approach. I have reviewed the available history, radiology, laboratory results, and reports with the Fellow.

## 2018-11-01 ENCOUNTER — TELEPHONE (OUTPATIENT)
Dept: PHARMACY | Facility: CLINIC | Age: 23
End: 2018-11-01

## 2018-11-01 NOTE — TELEPHONE ENCOUNTER
Attempted to contact the patient to for refill reminder call,  left message on voicemail    Follow-up Date: 11/05/18 2nd attempt    Shawna Yarbrough, [facility]  [phone]

## 2018-11-08 DIAGNOSIS — R71.8 RBC MICROCYTOSIS: ICD-10-CM

## 2018-11-08 DIAGNOSIS — Z21 HUMAN IMMUNODEFICIENCY VIRUS I INFECTION (H): ICD-10-CM

## 2018-11-08 LAB
CD3 CELLS # BLD: 1721 CELLS/UL (ref 603–2990)
CD3 CELLS NFR BLD: 71 % (ref 49–84)
CD3+CD4+ CELLS # BLD: 899 CELLS/UL (ref 441–2156)
CD3+CD4+ CELLS NFR BLD: 37 % (ref 28–63)
CD3+CD4+ CELLS/CD3+CD8+ CLL BLD: 1.28 % (ref 1.4–2.6)
CD3+CD8+ CELLS # BLD: 711 CELLS/UL (ref 125–1312)
CD3+CD8+ CELLS NFR BLD: 29 % (ref 10–40)
CHOLEST SERPL-MCNC: 152 MG/DL
ERYTHROCYTE [DISTWIDTH] IN BLOOD BY AUTOMATED COUNT: 15.8 % (ref 10–15)
HCT VFR BLD AUTO: 46.9 % (ref 40–53)
HDLC SERPL-MCNC: 44 MG/DL
HGB BLD-MCNC: 14.2 G/DL (ref 13.3–17.7)
IFC SPECIMEN: ABNORMAL
LDLC SERPL CALC-MCNC: 84 MG/DL
MCH RBC QN AUTO: 23.1 PG (ref 26.5–33)
MCHC RBC AUTO-ENTMCNC: 30.3 G/DL (ref 31.5–36.5)
MCV RBC AUTO: 76 FL (ref 78–100)
NONHDLC SERPL-MCNC: 108 MG/DL
PLATELET # BLD AUTO: 254 10E9/L (ref 150–450)
RBC # BLD AUTO: 6.14 10E12/L (ref 4.4–5.9)
T PALLIDUM AB SER QL: NONREACTIVE
TRIGL SERPL-MCNC: 120 MG/DL
WBC # BLD AUTO: 4.5 10E9/L (ref 4–11)

## 2018-11-09 LAB
HIV1 RNA # PLAS NAA DL=20: NORMAL {COPIES}/ML
HIV1 RNA SERPL NAA+PROBE-LOG#: NORMAL {LOG_COPIES}/ML

## 2018-11-10 LAB
HGB A1 MFR BLD: 96.3 % (ref 95–97.9)
HGB A2 MFR BLD: 2.8 % (ref 2–3.5)
HGB C MFR BLD: 0 % (ref 0–0)
HGB E MFR BLD: 0 % (ref 0–0)
HGB F MFR BLD: 0.9 % (ref 0–2.1)
HGB FRACT BLD ELPH-IMP: NORMAL
HGB OTHER MFR BLD: 0 % (ref 0–0)
HGB S BLD QL SOLY: NORMAL
HGB S MFR BLD: 0 % (ref 0–0)
PATH INTERP BLD-IMP: NORMAL

## 2018-11-14 ENCOUNTER — OFFICE VISIT (OUTPATIENT)
Dept: INFECTIOUS DISEASES | Facility: CLINIC | Age: 23
End: 2018-11-14
Attending: INTERNAL MEDICINE
Payer: COMMERCIAL

## 2018-11-14 VITALS
OXYGEN SATURATION: 96 % | HEART RATE: 71 BPM | DIASTOLIC BLOOD PRESSURE: 73 MMHG | BODY MASS INDEX: 29.65 KG/M2 | SYSTOLIC BLOOD PRESSURE: 126 MMHG | TEMPERATURE: 97.5 F | WEIGHT: 209.6 LBS

## 2018-11-14 DIAGNOSIS — Z23 NEED FOR HPV VACCINE: ICD-10-CM

## 2018-11-14 DIAGNOSIS — Z11.3 SCREENING FOR STD (SEXUALLY TRANSMITTED DISEASE): ICD-10-CM

## 2018-11-14 DIAGNOSIS — Z21 HUMAN IMMUNODEFICIENCY VIRUS I INFECTION (H): ICD-10-CM

## 2018-11-14 DIAGNOSIS — Z21 HUMAN IMMUNODEFICIENCY VIRUS I INFECTION (H): Primary | ICD-10-CM

## 2018-11-14 PROCEDURE — 87491 CHLMYD TRACH DNA AMP PROBE: CPT | Performed by: INTERNAL MEDICINE

## 2018-11-14 PROCEDURE — 87591 N.GONORRHOEAE DNA AMP PROB: CPT | Performed by: INTERNAL MEDICINE

## 2018-11-14 PROCEDURE — 90471 IMMUNIZATION ADMIN: CPT | Mod: ZF

## 2018-11-14 PROCEDURE — 90651 9VHPV VACCINE 2/3 DOSE IM: CPT | Mod: ZF | Performed by: INTERNAL MEDICINE

## 2018-11-14 PROCEDURE — 25000581 ZZH RX MED A9270 GY (STAT IND- M) 250: Mod: ZF | Performed by: INTERNAL MEDICINE

## 2018-11-14 PROCEDURE — G0463 HOSPITAL OUTPT CLINIC VISIT: HCPCS | Mod: 25,ZF

## 2018-11-14 RX ADMIN — HUMAN PAPILLOMAVIRUS 9-VALENT VACCINE, RECOMBINANT 0.5 ML: 30; 40; 60; 40; 20; 20; 20; 20; 20 INJECTION, SUSPENSION INTRAMUSCULAR at 14:12

## 2018-11-14 ASSESSMENT — PAIN SCALES - GENERAL: PAINLEVEL: NO PAIN (0)

## 2018-11-14 NOTE — NURSING NOTE
The following medication was given:     MEDICATION: Human Papillomavirus 9-Valent Vaccine, Recombinant   Gardasil 9  ROUTE: IM  SITE: Deltoid - Right  DOSE: 0.5ml  LOT #: [id number]  :  [employer], [employer], [employer]  EXPIRATION DATE:  11/20/19  NDC#: 2466-1808-44   1st Dose

## 2018-11-14 NOTE — MR AVS SNAPSHOT
After Visit Summary   11/14/2018    Iker Berrios    MRN: [id number]           Patient Information     Date Of Birth          1995        Visit Information        Provider Department      11/14/2018 1:00 PM Marci Rose DO [facility] and Infectious Diseases        Today's Diagnoses     Human immunodeficiency virus I infection (H)    -  1    Need for HPV vaccine        Screening for STD (sexually transmitted disease)          Care Instructions    You are receiving you first dose of the HPV vaccine today. You will need three shots total. Today will be the first. You will need to get the second shot in one month and the third shot in 6 months.     You had STD testing today. If anything comes back positive, we will be in contact with you regarding treatment.             Follow-ups after your visit        Follow-up notes from your care team     Return in about 2 months (around 1/16/2019) for Please schedule in Dr. Lesia Ventura's clinic. Schedule for one hour. .      Your next 10 appointments already scheduled     Dec 17, 2018  1:00 PM CST   Nurse Visit with  Dsc Nurse   [facility] and Infectious Diseases ([facility])    [address] [place] [id number]   [phone]            Jan 16, 2019  2:00 PM CST   (Arrive by 1:45 PM)   Return Visit with Lesia Chávez MD   [facility] and Infectious Diseases ([facility])    [address] [place] [address]  [address] [place] [id number]   [phone]            May 13, 2019  1:00 PM CDT   Nurse Visit with  Dsc Nurse   [facility] Infectious Diseases ([facility])    [address] [place] [address]  [address] [place] [address]   [phone]              Future tests that were ordered for you today     Open Future Orders        Priority Expected Expires Ordered    Neisseria gonorrhoeae PCR  (Urine) Routine  11/14/2019 11/14/2018    Chlamydia trachomatis PCR (Urine) Routine  11/14/2019 11/14/2018            Who to contact     If you have questions or need follow up information about today's clinic visit or your schedule please contact [facility] AND INFECTIOUS DISEASES directly at [phone].  Normal or non-critical lab and imaging results will be communicated to you by MyChart, letter or phone within 4 business days after the clinic has received the results. If you do not hear from us within 7 days, please contact the clinic through Clerkhart or phone. If you have a critical or abnormal lab result, we will notify you by phone as soon as possible.  Submit refill requests through Aethon or call your pharmacy and they will forward the refill request to us. Please allow 3 business days for your refill to be completed.          Additional Information About Your Visit        MyChart Information     Aethon gives you secure access to your electronic health record. If you see a primary care provider, you can also send messages to your care team and make appointments. If you have questions, please call your primary care clinic.  If you do not have a primary care provider, please call [phone] and they will assist you.        Care EveryWhere ID     This is your Care EveryWhere ID. This could be used by other organizations to access your [place] medical records  [id number]        Your Vitals Were     Pulse Temperature Pulse Oximetry BMI (Body Mass Index)          71 97.5  F (36.4  C) (Oral) 96% 29.65 kg/m2         Blood Pressure from Last 3 Encounters:   11/14/18 126/73   10/17/18 112/74   08/15/18 132/73    Weight from Last 3 Encounters:   11/14/18 95.1 kg (209 lb 9.6 oz)   10/17/18 94.5 kg (208 lb 6.4 oz)   08/15/18 95.7 kg (211 lb)               Primary Care Provider Fax #    Physician No Ref-Primary [phone]       No address on file        Equal Access to Services     MATTHEW ADAMS AH:  Hadii reagan puri Socristalali, waflynnda luqadaha, qaybta kaalbarry cano, kaur franciscoin hayaamusa niceabiel butchvicki lamaloriemusa nury. So [facility] [phone].    ATENCIÓN: Si habla nely, tiene a massey disposición servicios gratuitos de asistencia lingüística. Llame al [phone].    We comply with applicable federal civil rights laws and [place] laws. We do not discriminate on the basis of race, color, national origin, age, disability, sex, sexual orientation, or gender identity.            Thank you!     Thank you for choosing [facility] AND INFECTIOUS DISEASES  for your care. Our goal is always to provide you with excellent care. Hearing back from our patients is one way we can continue to improve our services. Please take a few minutes to complete the written survey that you may receive in the mail after your visit with us. Thank you!             Your Updated Medication List - Protect others around you: Learn how to safely use, store and throw away your medicines at www.disposemymeds.org.          This list is accurate as of 11/14/18  2:03 PM.  Always use your most recent med list.                   Brand Name Dispense Instructions for use Diagnosis    abacavir-dolutegravir-LamiVUDine 600- MG per tablet    TRIUMEQ    30 tablet    Take 1 tablet by mouth daily    Human immunodeficiency virus I infection (H)       clotrimazole 1 % cream    LOTRIMIN    15 g    Apply topically 2 times daily    Tinea versicolor

## 2018-11-14 NOTE — NURSING NOTE
Chief Complaint   Patient presents with     RECHECK     B20     /73  Pulse 71  Temp 97.5  F (36.4  C) (Oral)  Wt 95.1 kg (209 lb 9.6 oz)  SpO2 96%  BMI 29.65 kg/m2  Patricia Acosta MA

## 2018-11-14 NOTE — PATIENT INSTRUCTIONS
You are receiving you first dose of the HPV vaccine today. You will need three shots total. Today will be the first. You will need to get the second shot in one month and the third shot in 6 months.     You had STD testing today. If anything comes back positive, we will be in contact with you regarding treatment.

## 2018-11-15 LAB
C TRACH DNA SPEC QL NAA+PROBE: NEGATIVE
C TRACH DNA SPEC QL NAA+PROBE: POSITIVE
C TRACH DNA SPEC QL NAA+PROBE: POSITIVE
N GONORRHOEA DNA SPEC QL NAA+PROBE: POSITIVE
N GONORRHOEA DNA SPEC QL NAA+PROBE: POSITIVE
SPECIMEN SOURCE: ABNORMAL
SPECIMEN SOURCE: NORMAL

## 2018-11-16 ENCOUNTER — ALLIED HEALTH/NURSE VISIT (OUTPATIENT)
Dept: INFECTIOUS DISEASES | Facility: CLINIC | Age: 23
End: 2018-11-16
Attending: INTERNAL MEDICINE
Payer: COMMERCIAL

## 2018-11-16 DIAGNOSIS — A74.9 CHLAMYDIA: ICD-10-CM

## 2018-11-16 DIAGNOSIS — A54.9 GONORRHEA: Primary | ICD-10-CM

## 2018-11-16 LAB
N GONORRHOEA DNA SPEC QL NAA+PROBE: POSITIVE
SPECIMEN SOURCE: ABNORMAL

## 2018-11-16 PROCEDURE — 25000125 ZZHC RX 250: Mod: ZF | Performed by: INTERNAL MEDICINE

## 2018-11-16 PROCEDURE — 96372 THER/PROPH/DIAG INJ SC/IM: CPT

## 2018-11-16 PROCEDURE — 25000132 ZZH RX MED GY IP 250 OP 250 PS 637: Mod: ZF | Performed by: INTERNAL MEDICINE

## 2018-11-16 PROCEDURE — 25000128 H RX IP 250 OP 636: Mod: ZF | Performed by: INTERNAL MEDICINE

## 2018-11-16 RX ORDER — LIDOCAINE HYDROCHLORIDE 10 MG/ML
0.9 INJECTION, SOLUTION EPIDURAL; INFILTRATION; INTRACAUDAL; PERINEURAL ONCE
Status: COMPLETED | OUTPATIENT
Start: 2018-11-16 | End: 2018-11-16

## 2018-11-16 RX ORDER — CEFTRIAXONE SODIUM 250 MG
250 VIAL (EA) INJECTION ONCE
Status: COMPLETED | OUTPATIENT
Start: 2018-11-16 | End: 2018-11-16

## 2018-11-16 RX ORDER — AZITHROMYCIN 500 MG/1
1000 TABLET, FILM COATED ORAL DAILY
Status: DISCONTINUED | OUTPATIENT
Start: 2018-11-16 | End: 2019-08-16

## 2018-11-16 RX ADMIN — CEFTRIAXONE SODIUM 250 MG: 250 INJECTION, POWDER, FOR SOLUTION INTRAMUSCULAR; INTRAVENOUS at 13:59

## 2018-11-16 RX ADMIN — AZITHROMYCIN 1000 MG: 500 TABLET, FILM COATED ORAL at 13:59

## 2018-11-16 RX ADMIN — LIDOCAINE HYDROCHLORIDE 0.9 ML: 10 INJECTION, SOLUTION EPIDURAL; INFILTRATION; INTRACAUDAL; PERINEURAL at 14:00

## 2018-11-16 NOTE — NURSING NOTE
MEDICATION: Rocephin 250mg and Lidocaine 0.9cc   Route: IM  SITE: LUQ and RUQ - Gluteus  DOSE: 250mg  LOT#: [id number]  : 1000memories   EXPIRATION DATE: 07/01/2020    Pt tolerated medication well. Pt left in care of self.     Teresa Ku, CMA

## 2018-11-16 NOTE — MR AVS SNAPSHOT
After Visit Summary   11/16/2018    Iker Berrios    MRN: [id number]           Patient Information     Date Of Birth          1995        Visit Information        Provider Department      11/16/2018 1:00 PM Nurse, [facility] and Infectious Diseases         Follow-ups after your visit        Your next 10 appointments already scheduled     Dec 17, 2018  1:00 PM CST   Nurse Visit with [facility] Dsc Nurse   [facility] and Infectious Diseases ([facility])    [address] [place] [address] [address]  [address]  [facility] [address] [phone]            Jan 16, 2019  2:00 PM CST   (Arrive by 1:45 PM)   Return Visit with Lesia Chávez MD   [facility] and Infectious Diseases ([facility])    [address] [place] [address]  [address]  [facility] [address] [phone]            May 13, 2019  1:00 PM CDT   Nurse Visit with [facility] Dsc Nurse   [facility] and Infectious Diseases ([facility])    [address] [place] [address]  [address]  [facility] [address] [phone]              Who to contact     If you have questions or need follow up information about today's clinic visit or your schedule please contact [facility] AND INFECTIOUS DISEASES directly at [phone].  Normal or non-critical lab and imaging results will be communicated to you by MyChart, letter or phone within 4 business days after the clinic has received the results. If you do not hear from us within 7 days, please contact the clinic through MyChart or phone. If you have a critical or abnormal lab result, we will notify you by phone as soon as possible.  Submit refill requests through Lagou or call your pharmacy and they will forward the refill request to us. Please allow 3 business days for your refill to be completed.          Additional Information About Your Visit        MyChart Information      Precog gives you secure access to your electronic health record. If you see a primary care provider, you can also send messages to your care team and make appointments. If you have questions, please call your primary care clinic.  If you do not have a primary care provider, please call [phone] and they will assist you.        Care EveryWhere ID     This is your Care EveryWhere ID. This could be used by other organizations to access your [place] medical records  [id number]         Blood Pressure from Last 3 Encounters:   11/14/18 126/73   10/17/18 112/74   08/15/18 132/73    Weight from Last 3 Encounters:   11/14/18 95.1 kg (209 lb 9.6 oz)   10/17/18 94.5 kg (208 lb 6.4 oz)   08/15/18 95.7 kg (211 lb)              Today, you had the following     No orders found for display       Primary Care Provider Fax #    Physician No Ref-Primary [phone]       No address on file        Equal Access to Services     MATTHEW ADAMS : Hadii aad ku hadasho Soomaali, waaxda luqadaha, qaybta kaalmada adeegyada, waxay cristy delaney . So [facility] [phone].    ATENCIÓN: Si habla español, tiene a massey disposición servicios gratuitos de asistencia lingüística. Llame al [phone].    We comply with applicable federal civil rights laws and [place] laws. We do not discriminate on the basis of race, color, national origin, age, disability, sex, sexual orientation, or gender identity.            Thank you!     Thank you for choosing [facility] AND INFECTIOUS DISEASES  for your care. Our goal is always to provide you with excellent care. Hearing back from our patients is one way we can continue to improve our services. Please take a few minutes to complete the written survey that you may receive in the mail after your visit with us. Thank you!             Your Updated Medication List - Protect others around you: Learn how to safely use, store and throw away your medicines at www.disposemymeds.org.           This list is accurate as of 11/16/18  1:19 PM.  Always use your most recent med list.                   Brand Name Dispense Instructions for use Diagnosis    abacavir-dolutegravir-LamiVUDine 600- MG per tablet    TRIUMEQ    30 tablet    Take 1 tablet by mouth daily    Human immunodeficiency virus I infection (H)       clotrimazole 1 % cream    LOTRIMIN    15 g    Apply topically 2 times daily    Tinea versicolor

## 2018-11-16 NOTE — PROGRESS NOTES
Case report submitted for gonorrhea lab positive collected on 11/14/18.  Iza Strickland 11/16/18  [facility] Infection Prevention  [phone]

## 2018-11-20 ENCOUNTER — MYC MEDICAL ADVICE (OUTPATIENT)
Dept: PHARMACY | Facility: CLINIC | Age: 23
End: 2018-11-20

## 2018-11-20 NOTE — TELEPHONE ENCOUNTER
Maribel, clinic pharmacist ended up calling pt to help him understand his malaise and swollen lymph node likely are not from his chlamydia and gonorrhea treatment as they do not present like an allergy to a medication, but instead he likely has a viral infection of some kind. Pt's immune system is very robust with a CD4 of 899 so this likely is not HIV related either. Pt was amenable to going to [email or web address] to get checked out.  Amber Mackey RN

## 2018-12-03 ENCOUNTER — TELEPHONE (OUTPATIENT)
Dept: PHARMACY | Facility: CLINIC | Age: 23
End: 2018-12-03

## 2018-12-03 NOTE — PROGRESS NOTES
[facility]  Infectious Disease Clinic Note     Patient:  Iker Berrios, Date of birth 1995, Medical record number [id number]  Date of Visit: 11/14/2018         Assessment and Plan:   Plan:  1) STI testing today   2) HPV Vaccine - first dose today, 2nd dose in 1-2 months, and 3rd dose in 6 months  3) Return to clinic in 2 months with repeat HIV Labs    Assessment:  Iker Berrios is a 21 y/o who presents for routine HIV care.    Patient discussed with Dr. Colin Rose, Infectious Diseases Fellow  [phone]    History of the Infectious Disease Illness:     Here for routine HIV care. Has been doing well on the Triumeq. No missed doses, no medication side effects. No new symptoms or complaints today.         HIV History:   Diagnosed HIV positive: 6/19/2018 at [facility]   (Truvada for PrEP from 7740-8034. Tested HIV negative coming off of Truvada. He was noted to have a viral illness in March 2018 that last about 2 weeks)  Viral load at diagnosis: 2654  CD4 count at diagnosis: 783  ART start date: 7/9/2018  ARV History: Triumeq   HIV Genotype: 6/07/2018 - No resistance found   OI's: None to date     STI:   6/19/2018 GC/Chlamydia Negative, 6/27/2018 RPR Negative     11/8/2018: TPA Nonreactive  11/14/2018: GC/CT Urine and rectal Positive, GC Throat Positive     Serology:   Hepatitis A Reactive, Hepatitis B surface Antigen Non reactive, Hepatitis C antibody Non reactive   TB Quant Negative  CMV Positive  Toxo Negative  HLA B57:01 Negative     Past Medical History:   None    Social History:   No smoking   No alcohol   No illicit drugs  Sexual preference: MSM, both insertive and receptive, nonconsistent condom use.    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  Currently enrolled at the [employer] majoring in psychology            Review of Systems:   CONSTITUTIONAL:  No fevers or chills  EYES: negative for icterus  ENT:  negative for  hearing loss, tinnitus and sore throat  RESPIRATORY:  negative for cough with sputum and dyspnea  CARDIOVASCULAR:  negative for chest pain, dyspnea  GASTROINTESTINAL:  negative for nausea, vomiting, diarrhea and constipation  GENITOURINARY:  negative for dysuria  HEME:  No easy bruising  INTEGUMENT:  negative for rash and pruritus  NEURO:  Negative for headache         Current Medications (antimicrobials listed in bold):     Current Outpatient Prescriptions   Medication Sig Dispense Refill     abacavir-dolutegravir-LamiVUDine (TRIUMEQ) 600- MG per tablet Take 1 tablet by mouth daily 30 tablet 11     clotrimazole (LOTRIMIN) 1 % cream Apply topically 2 times daily 15 g 1          Allergies:   No Known Allergies       Physical Exam:   Vitals were reviewed.  All vitals stable  /73  Pulse 71  Temp 97.5  F (36.4  C) (Oral)  Wt 209 lb 9.6 oz (95.1 kg)  SpO2 96%  BMI 29.65 kg/m2  Exam:  GENERAL:  well-developed, well-nourished, alert, oriented, in no acute distress.  HEENT:  Head is normocephalic, atraumatic     EYES:  Eyes have anicteric sclerae.    LUNGS: Non labored respiration   CARDIOVASCULAR:  Regular rate and rhythm with no murmurs, gallops or rubs.  ABDOMEN:  Normal bowel sounds, soft, nontender.  SKIN:  Hypopigmented rash on his neck, small round, non raised, patches    NEUROLOGIC:  Grossly nonfocal.         Laboratory Data:     CD4 counts    Absolute CD4   Date Value Ref Range Status   11/08/2018 899 441 - 2156 cells/uL Final   10/12/2018 763 441 - 2156 cells/uL Final   08/07/2018 493 441 - 2156 cells/uL Final   06/25/2018 783 441 - 2156 cells/uL Final     Metabolic Studies    Recent Labs   Lab Test  10/12/18   1251  08/07/18   1152  06/27/18   1447  03/07/18   1303   NA  138  137  139  137   POTASSIUM  4.1  4.1  3.8  4.1   CHLORIDE  104  103  106  104   CO2  28  27  26  22   ANIONGAP  6  7  7  11   BUN  27  18  26  17   CR  1.43*  1.54*  1.15  1.57*   GFRESTIMATED  60*  55*  78  54*   GLC  84  78   73  88   MARIELY  8.9  9.4  9.0  8.2*     Hepatic Studies    Recent Labs   Lab Test  10/12/18   1251  08/07/18   1152  06/27/18   1447  03/07/18   1303   BILITOTAL  0.3  0.3  0.4  0.2   ALKPHOS  63  82  62  57   ALBUMIN  4.0  4.0  4.1  3.7   AST  40  29  39  63*   ALT  34  33  36  43     Pancreatitis testing    Recent Labs   Lab Test  11/08/18   1014  06/25/18   1415   AMYLASE   --   124*   LIPASE   --   160   TRIG  120   --      Hematology Studies    Recent Labs   Lab Test  11/08/18   1014  10/12/18   1251  08/07/18   1152  06/25/18   1415  03/07/18   1303   WBC  4.5  5.4  5.5  6.1  5.3   ANEU   --   2.5  3.1  2.4  3.9   ALYM   --   2.1  1.2  2.8  1.1   JANAE   --   0.7  0.9  0.7  0.3   AEOS   --   0.1  0.2  0.2  0.0   HGB  14.2  14.3  15.0  14.2  14.5   HCT  46.9  46.7  49.8  47.3  44.6   MCV  76*  76*  74*  74*  70*   PLT  254  280  274  300  148*

## 2018-12-03 NOTE — PROGRESS NOTES
[facility] Staff Note: Mr. Cope was seen, examined, and the case was discussed with Dr. Rose, ID Fellow -- I agree with her interval history and examination, assessment and plan in this outpatient ID / HIV Progress note. This note reflects my observations and opinions, and the plan outlined fully reflects my approach. I have reviewed the available history, radiology, laboratory results, and reports with the Fellow.

## 2018-12-03 NOTE — TELEPHONE ENCOUNTER
Attempted to contact the patient to for refill reminder call,  left message on voicemail    Follow-up Date: 12/06/18 2nd attempt    Shawna Yarbrough, [facility]  [phone]

## 2018-12-05 ENCOUNTER — TELEPHONE (OUTPATIENT)
Dept: PHARMACY | Facility: CLINIC | Age: 23
End: 2018-12-05

## 2018-12-05 NOTE — TELEPHONE ENCOUNTER
Called patient for refill reminder.    Patient will   1 prescriptions on 12/5/18    3 month of on time refill.    Follow-up Date: 12/26/18    Shawna Yarbrough [facility]  [phone]

## 2018-12-06 DIAGNOSIS — Z21 HUMAN IMMUNODEFICIENCY VIRUS I INFECTION (H): Primary | ICD-10-CM

## 2018-12-17 ENCOUNTER — ALLIED HEALTH/NURSE VISIT (OUTPATIENT)
Dept: INFECTIOUS DISEASES | Facility: CLINIC | Age: 23
End: 2018-12-17
Payer: COMMERCIAL

## 2018-12-17 DIAGNOSIS — Z00.00 HEALTH CARE MAINTENANCE: Primary | ICD-10-CM

## 2018-12-17 PROCEDURE — 90471 IMMUNIZATION ADMIN: CPT | Mod: ZF

## 2018-12-17 PROCEDURE — 25000581 ZZH RX MED A9270 GY (STAT IND- M) 250: Mod: ZF | Performed by: INTERNAL MEDICINE

## 2018-12-17 PROCEDURE — 90651 9VHPV VACCINE 2/3 DOSE IM: CPT | Mod: ZF | Performed by: INTERNAL MEDICINE

## 2018-12-17 RX ADMIN — HUMAN PAPILLOMAVIRUS 9-VALENT VACCINE, RECOMBINANT 0.5 ML: 30; 40; 60; 40; 20; 20; 20; 20; 20 INJECTION, SUSPENSION INTRAMUSCULAR at 13:45

## 2018-12-26 ENCOUNTER — TELEPHONE (OUTPATIENT)
Dept: PHARMACY | Facility: CLINIC | Age: 23
End: 2018-12-26

## 2018-12-26 NOTE — TELEPHONE ENCOUNTER
Attempted to contact the patient to for refill reminder call,  left message on voicemail    Follow-up Date: 12/31/18  2nd attempt    Shawna Yarbrough, [facility]  [phone]

## 2019-01-07 NOTE — TELEPHONE ENCOUNTER
Called patient for refill reminder.    Patient will   1 prescriptions on 01/05/19    Follow-up Date:

## 2019-01-16 ENCOUNTER — OFFICE VISIT (OUTPATIENT)
Dept: INFECTIOUS DISEASES | Facility: CLINIC | Age: 24
End: 2019-01-16
Attending: INTERNAL MEDICINE
Payer: COMMERCIAL

## 2019-01-16 VITALS
WEIGHT: 217 LBS | TEMPERATURE: 98.5 F | HEART RATE: 85 BPM | BODY MASS INDEX: 31.07 KG/M2 | HEIGHT: 70 IN | DIASTOLIC BLOOD PRESSURE: 75 MMHG | SYSTOLIC BLOOD PRESSURE: 127 MMHG

## 2019-01-16 DIAGNOSIS — Z21 ASYMPTOMATIC HUMAN IMMUNODEFICIENCY VIRUS (HIV) INFECTION STATUS (H): ICD-10-CM

## 2019-01-16 DIAGNOSIS — F32.0 CURRENT MILD EPISODE OF MAJOR DEPRESSIVE DISORDER WITHOUT PRIOR EPISODE (H): ICD-10-CM

## 2019-01-16 DIAGNOSIS — E66.3 OVERWEIGHT: ICD-10-CM

## 2019-01-16 DIAGNOSIS — Z21 HUMAN IMMUNODEFICIENCY VIRUS I INFECTION (H): Primary | ICD-10-CM

## 2019-01-16 LAB
ALBUMIN SERPL-MCNC: 4.5 G/DL (ref 3.4–5)
ALP SERPL-CCNC: 72 U/L (ref 40–150)
ALT SERPL W P-5'-P-CCNC: 36 U/L (ref 0–70)
ANION GAP SERPL CALCULATED.3IONS-SCNC: 6 MMOL/L (ref 3–14)
AST SERPL W P-5'-P-CCNC: 43 U/L (ref 0–45)
BASOPHILS # BLD AUTO: 0 10E9/L (ref 0–0.2)
BASOPHILS NFR BLD AUTO: 0.3 %
BILIRUB SERPL-MCNC: 0.4 MG/DL (ref 0.2–1.3)
BUN SERPL-MCNC: 28 MG/DL (ref 7–30)
CALCIUM SERPL-MCNC: 9.5 MG/DL (ref 8.5–10.1)
CHLORIDE SERPL-SCNC: 102 MMOL/L (ref 94–109)
CO2 SERPL-SCNC: 28 MMOL/L (ref 20–32)
CREAT SERPL-MCNC: 1.36 MG/DL (ref 0.66–1.25)
DIFFERENTIAL METHOD BLD: ABNORMAL
EOSINOPHIL # BLD AUTO: 0.1 10E9/L (ref 0–0.7)
EOSINOPHIL NFR BLD AUTO: 1.7 %
ERYTHROCYTE [DISTWIDTH] IN BLOOD BY AUTOMATED COUNT: 16.2 % (ref 10–15)
GFR SERPL CREATININE-BSD FRML MDRD: 72 ML/MIN/{1.73_M2}
GLUCOSE SERPL-MCNC: 83 MG/DL (ref 70–99)
HCT VFR BLD AUTO: 52.7 % (ref 40–53)
HGB BLD-MCNC: 15.5 G/DL (ref 13.3–17.7)
IMM GRANULOCYTES # BLD: 0 10E9/L (ref 0–0.4)
IMM GRANULOCYTES NFR BLD: 0.2 %
LYMPHOCYTES # BLD AUTO: 2.7 10E9/L (ref 0.8–5.3)
LYMPHOCYTES NFR BLD AUTO: 46.8 %
MCH RBC QN AUTO: 22.5 PG (ref 26.5–33)
MCHC RBC AUTO-ENTMCNC: 29.4 G/DL (ref 31.5–36.5)
MCV RBC AUTO: 77 FL (ref 78–100)
MONOCYTES # BLD AUTO: 0.7 10E9/L (ref 0–1.3)
MONOCYTES NFR BLD AUTO: 11.2 %
NEUTROPHILS # BLD AUTO: 2.3 10E9/L (ref 1.6–8.3)
NEUTROPHILS NFR BLD AUTO: 39.8 %
NRBC # BLD AUTO: 0 10*3/UL
NRBC BLD AUTO-RTO: 0 /100
PLATELET # BLD AUTO: 285 10E9/L (ref 150–450)
POTASSIUM SERPL-SCNC: 4 MMOL/L (ref 3.4–5.3)
PROT SERPL-MCNC: 9 G/DL (ref 6.8–8.8)
RBC # BLD AUTO: 6.89 10E12/L (ref 4.4–5.9)
SODIUM SERPL-SCNC: 137 MMOL/L (ref 133–144)
WBC # BLD AUTO: 5.8 10E9/L (ref 4–11)

## 2019-01-16 PROCEDURE — 87536 HIV-1 QUANT&REVRSE TRNSCRPJ: CPT | Performed by: INTERNAL MEDICINE

## 2019-01-16 PROCEDURE — 87591 N.GONORRHOEAE DNA AMP PROB: CPT | Performed by: INTERNAL MEDICINE

## 2019-01-16 PROCEDURE — 85025 COMPLETE CBC W/AUTO DIFF WBC: CPT | Performed by: INTERNAL MEDICINE

## 2019-01-16 PROCEDURE — G0463 HOSPITAL OUTPT CLINIC VISIT: HCPCS | Mod: ZF

## 2019-01-16 PROCEDURE — 86360 T CELL ABSOLUTE COUNT/RATIO: CPT | Performed by: INTERNAL MEDICINE

## 2019-01-16 PROCEDURE — 86359 T CELLS TOTAL COUNT: CPT | Performed by: INTERNAL MEDICINE

## 2019-01-16 PROCEDURE — 80053 COMPREHEN METABOLIC PANEL: CPT | Performed by: INTERNAL MEDICINE

## 2019-01-16 PROCEDURE — 87491 CHLMYD TRACH DNA AMP PROBE: CPT | Performed by: INTERNAL MEDICINE

## 2019-01-16 PROCEDURE — 86780 TREPONEMA PALLIDUM: CPT | Performed by: INTERNAL MEDICINE

## 2019-01-16 PROCEDURE — 36415 COLL VENOUS BLD VENIPUNCTURE: CPT | Performed by: INTERNAL MEDICINE

## 2019-01-16 RX ORDER — SERTRALINE HYDROCHLORIDE 25 MG/1
25 TABLET, FILM COATED ORAL DAILY
Qty: 90 TABLET | Refills: 1 | Status: SHIPPED | OUTPATIENT
Start: 2019-01-16 | End: 2019-01-31

## 2019-01-16 RX ORDER — SERTRALINE HYDROCHLORIDE 25 MG/1
25 TABLET, FILM COATED ORAL DAILY
Status: DISCONTINUED | OUTPATIENT
Start: 2019-01-16 | End: 2019-01-16

## 2019-01-16 RX ORDER — SERTRALINE HYDROCHLORIDE 25 MG/1
25 TABLET, FILM COATED ORAL DAILY
Status: DISCONTINUED | OUTPATIENT
Start: 2019-01-16 | End: 2019-01-16 | Stop reason: CLARIF

## 2019-01-16 ASSESSMENT — PAIN SCALES - GENERAL: PAINLEVEL: NO PAIN (0)

## 2019-01-16 ASSESSMENT — PATIENT HEALTH QUESTIONNAIRE - PHQ9: SUM OF ALL RESPONSES TO PHQ QUESTIONS 1-9: 20

## 2019-01-16 ASSESSMENT — MIFFLIN-ST. JEOR: SCORE: 1975.56

## 2019-01-16 NOTE — NURSING NOTE
PQ-9 score entered per Dr. Chávez, provider aware of score (20) and states pt does not need to see anyone today.    Flavia Manzano [facility]  1/16/2019 3:00 PM

## 2019-01-16 NOTE — PROGRESS NOTES
[facility]  Infectious Disease Clinic Note     Patient:  Iker Berrios, Date of birth 1995, Medical record number [id number]  Date of Visit: 11/14/2018         Assessment and Plan:   Assessment/Plan:  Iker Berrios is a 24 y/o man with well-controlled HIV and history of recent gonorrhea and chlamydia who presents for routine HIV care.  He is transferring care from Dr. Rose, who has left the clinic.  A new issue today is significant depressive mood symptoms.    # HIV: Infected sometime in 2017 or 2018, likely March 2018 when he had viral syndrome.  Scott CD4 493, now 899 with undetectable viral load on Triumeq.  - Continue Triumeq.  Reinforced his already excellent adherence  - Routine HIV labs today  - STI testing screening - throat, urine, rectal GC/chlamydia, syphilis serology    # Major depression: PHQ9 was 20 today.  Denies thoughts of self harm.  Is already seeing a counselor.  Has not explored medications but is interested in starting.  - Start sertraline 25mg daily.  Will check in by phone in 1-2 weeks and consider titrating up dose.    Preventative health  - Vaccines: Due for 3rd dose of  HPV vaccine in April 2019.  Up to date on seasonal influenza, meningococcal.  HBV and HAV immune.  Would be candidate for pneumococcal vaccine, which we will address at future visit.  - Order placed for nutrition consult per patient request for weight management    Return in 3 months for routine HIV care.  May return sooner if needed for mental health concerns.    Patient discussed with Dr. Colin Chávez MD, PhD  Adult & Pediatric Infectious Diseases Fellow PGY7, [facility]  Pager: [phone]    Interval History   Here for routine HIV care transferring care from Dr. Rose. Has been doing well on the Triumeq, which he has been taking since July 2018. No missed doses but had one dose 4h late.  He gradually adjusted his dosing time until back on usual schedule.       Concerns today include a desire for nutritionist consult to work on weight loss.  Tried to scheduled but was told he needed a referral.  Upon further questioning, he also reports many significant mood symptoms, trouble with sleep.  He has been staying up later than usual (3-4 am vs prior 11 pm).  Still goes to the gym but feels like he has less energy to do so. PHQ9 score was 20.  Denies any active thoughts of self harm.  No access to firearms.  He is seeing a counselor but no medication prescriber.  Our clinic is where he received primary care.     Reviewed STI risk factors.  No steady partners but sexually active with 7-8 male partners in the past 3 months, largely different from those he was hooking up with at the time of his gonorrhea and chlamydia diagnosis.  Denies STI symptoms.          HIV History:   Diagnosed HIV positive: 6/19/2018 at [facility]   (Truvada for PrEP from 7375-2249. Tested HIV negative coming off of Truvada. He was noted to have a viral illness in March 2018 that last about 2 weeks)  Viral load at diagnosis: 2654  CD4 count at diagnosis: 783  ART start date: 7/9/2018  ARV History: Triumeq   HIV Genotype: 6/07/2018 - No resistance found   OI's: None to date     STI:   6/19/2018 GC/Chlamydia Negative, 6/27/2018 RPR Negative, HCV negative  11/8/2018: TPA Nonreactive  11/14/2018: GC/CT Urine and rectal Positive, GC Throat Positive   1/16/19: GC/CT pending, treponema pending    Serology:   Hepatitis A Reactive, Hepatitis B surface Antigen Non reactive, Hepatitis C antibody Non reactive   TB Quant Negative 6/27/18  CMV Positive  Toxo Negative  HLA B57:01 Negative     Past Medical History:   None    Social History:   No smoking   No alcohol   No illicit drugs  Sexual preference: MSM, both insertive and receptive, nonconsistent condom use.    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 enrolled at the  [employer] majoring in psychology            " Review of Systems:   CONSTITUTIONAL:  No fevers or chills  EYES: negative for icterus  ENT:  negative for hearing loss, tinnitus and sore throat  RESPIRATORY:  negative for cough with sputum and dyspnea  CARDIOVASCULAR:  negative for chest pain, dyspnea  GASTROINTESTINAL:  negative for nausea, vomiting, diarrhea and constipation  GENITOURINARY:  negative for dysuria  HEME:  No easy bruising  INTEGUMENT:  negative for rash and pruritus  NEURO:  Negative for headache         Current Medications (antimicrobials listed in bold):     Current Outpatient Medications   Medication Sig Dispense Refill     abacavir-dolutegravir-LamiVUDine (TRIUMEQ) 600- MG per tablet Take 1 tablet by mouth daily 30 tablet 11     sertraline (ZOLOFT) 25 MG tablet Take 1 tablet (25 mg) by mouth daily 90 tablet 1          Allergies:   No Known Allergies       Physical Exam:   Vitals were reviewed.  All vitals stable  /75   Pulse 85   Temp 98.5  F (36.9  C) (Oral)   Ht 1.778 m (5' 10\")   Wt 98.4 kg (217 lb)   BMI 31.14 kg/m    Exam:  GENERAL:  well-developed, well-nourished, alert, oriented, in no acute distress.  HEENT:  Head is normocephalic, atraumatic     EYES:  Eyes have anicteric sclerae.    LUNGS: Non labored respiration   CARDIOVASCULAR:  Regular rate and rhythm with no murmurs, gallops or rubs.  ABDOMEN:  Normal bowel sounds, soft, nontender.  SKIN: No rashes  NEUROLOGIC:  Grossly nonfocal.         Laboratory Data:     CD4 counts    Absolute CD4   Date Value Ref Range Status   11/08/2018 899 441 - 2,156 cells/uL Final   10/12/2018 763 441 - 2,156 cells/uL Final   08/07/2018 493 441 - 2,156 cells/uL Final   06/25/2018 783 441 - 2,156 cells/uL Final     Metabolic Studies    Recent Labs   Lab Test 10/12/18  1251 08/07/18  1152 06/27/18  1447 03/07/18  1303    137 139 137   POTASSIUM 4.1 4.1 3.8 4.1   CHLORIDE 104 103 106 104   CO2 28 27 26 22   ANIONGAP 6 7 7 11   BUN 27 18 26 17   CR 1.43* 1.54* 1.15 1.57* "   GFRESTIMATED 60* 55* 78 54*   GLC 84 78 73 88   MARIELY 8.9 9.4 9.0 8.2*     Hepatic Studies    Recent Labs   Lab Test 10/12/18  1251 08/07/18  1152 06/27/18  1447 03/07/18  1303   BILITOTAL 0.3 0.3 0.4 0.2   ALKPHOS 63 82 62 57   ALBUMIN 4.0 4.0 4.1 3.7   AST 40 29 39 63*   ALT 34 33 36 43     Pancreatitis testing    Recent Labs   Lab Test 11/08/18  1014 06/25/18  1415   AMYLASE  --  124*   LIPASE  --  160   TRIG 120  --      Hematology Studies    Recent Labs   Lab Test 01/16/19  1512 11/08/18  1014 10/12/18  1251 08/07/18  1152 06/25/18  1415 03/07/18  1303   WBC 5.8 4.5 5.4 5.5 6.1 5.3   ANEU 2.3  --  2.5 3.1 2.4 3.9   ALYM 2.7  --  2.1 1.2 2.8 1.1   JANAE 0.7  --  0.7 0.9 0.7 0.3   AEOS 0.1  --  0.1 0.2 0.2 0.0   HGB 15.5 14.2 14.3 15.0 14.2 14.5   HCT 52.7 46.9 46.7 49.8 47.3 44.6   MCV 77* 76* 76* 74* 74* 70*    254 280 274 300 148*

## 2019-01-16 NOTE — NURSING NOTE
"/75   Pulse 85   Temp 98.5  F (36.9  C) (Oral)   Ht 1.778 m (5' 10\")   Wt 98.4 kg (217 lb)   BMI 31.14 kg/m    Chief Complaint   Patient presents with     RECHECK     follow up with B20, tzimmer cma       "

## 2019-01-16 NOTE — PATIENT INSTRUCTIONS
1. Check routine HIV labs today  2. Repeat STI screening. We will   3. Start sertraline 25mg daily.  Could increase dose to 50mg in 1-2 weeks as needed until we get good symptom control.

## 2019-01-17 ENCOUNTER — ALLIED HEALTH/NURSE VISIT (OUTPATIENT)
Dept: TRANSPLANT | Facility: CLINIC | Age: 24
End: 2019-01-17
Payer: COMMERCIAL

## 2019-01-17 DIAGNOSIS — Z21 HUMAN IMMUNODEFICIENCY VIRUS I INFECTION (H): Primary | ICD-10-CM

## 2019-01-17 LAB
C TRACH DNA SPEC QL NAA+PROBE: NEGATIVE
CD3 CELLS # BLD: 1681 CELLS/UL (ref 603–2990)
CD3 CELLS NFR BLD: 65 % (ref 49–84)
CD3+CD4+ CELLS # BLD: 801 CELLS/UL (ref 441–2156)
CD3+CD4+ CELLS NFR BLD: 31 % (ref 28–63)
CD3+CD4+ CELLS/CD3+CD8+ CLL BLD: 1.03 % (ref 1.4–2.6)
CD3+CD8+ CELLS # BLD: 764 CELLS/UL (ref 125–1312)
CD3+CD8+ CELLS NFR BLD: 30 % (ref 10–40)
IFC SPECIMEN: ABNORMAL
N GONORRHOEA DNA SPEC QL NAA+PROBE: NEGATIVE
SPECIMEN SOURCE: NORMAL
T PALLIDUM AB SER QL: NONREACTIVE

## 2019-01-17 PROCEDURE — 97802 MEDICAL NUTRITION INDIV IN: CPT | Mod: ZF | Performed by: DIETITIAN, REGISTERED

## 2019-01-18 NOTE — PROGRESS NOTES
Outpatient MNT     Time Spent: 30 minutes  Visit Type: Initial  Referring Physician: Dr Chávez   Reason for RD Visit: pt desire for weight loss, pt wondering if diet is contributing to feeling sluggish   Pt accompanied by: self    Nutrition Assessment  Pt reports feeling sluggish, which he correlates with his HIV diagnosis June/July 2018. He reports recently changing his diet around (has in the past, but took a break, but now changing some more things this time around). Pt has been tracking calories with [employer] x 2 weeks. First week was 2800 edwin/day, now 2300 edwin/day. He is aiming for 200 g protein/day, based off a recommendation he heard to get the same amount of grams as pounds he weighs. He reports some situational anxiety surrounding logging a food high in protein and it containing fat (ie ground turkey will have some fat and pt reports anxiety with seeing this, although still consumes). Getting on average approx 27% calories from fat, 35% edwin from carbs and 35% edwin from protein. He is just starting zoloft for depressive sx per MD note. Of note,  Creatinine 1.36 and est GFR 72 on 1/16/19.     Vitamins, Supplements, Pertinent Meds: MVI, fish oil, CLA, BCAA, creatine, protein powder (Gold Standard Whey 1 scoop = 24 g protein, 120 calories). Pt recently increased from 2 protein powder scoops to 4-5 scoops/day  Herbal Medicines/Supplements: none     Diet Recall  8-9 am: wakes up   9-10 am breakfast: english muffin with 2 eggs, 1 slice miles, some cheese   10-11 am: protein shake (almond milk + 2 scoops protein powder = 48 g protein)  Goes to the gym  3-4 pm: 7 oz chicken breast + white rice + broccoli  Shortly thereafter has another 48 g protein shake  Evening may have something light: Rice Krispie bar, dried seaweed, or maybe another protein shake (1 scoop/24 g) or 4 ounce turkey ger   Beverages: water, 1-2 coffee/week with cream and sugar  Etoh: none  Dining out: 1x/week     Physical Activity  Pt reports  taking a few months off around the holidays, but getting back into activity trying to increase to his baseline. Current activity:  - HIIT 15-20 min 1-2x/week   - 2 hours of strenuous weight lifting daily     Anthropometrics  Height:   70 in   BMI:    31.1    Weight Status:Obesity Grade I BMI 30-34.9   Weight:  217 lbs            IBW (lb): 166  % IBW: 131    Wt Hx: Pt reports he is trying to lose weight.     Adj/dosing BW: 179 lbs/81 kg       Malnutrition  % Intake: decreased intake does not meet criteria for - intentional   % Weight Loss: None noted  Subcutaneous Fat Loss: None  Muscle Loss: None  Fluid Accumulation/Edema: None noted  Malnutrition Diagnosis: Patient does not meet two of the above criteria necessary for diagnosing malnutrition    Estimated Nutrition Needs  Energy  1251-9733     (20-25 kcal/kg dosing BW for desired wt loss)       Protein      (1.2-1.5 g/kg for muscle building)         Fluid  1 ml/kcal or per MD     Nutrition Diagnosis  Food and nutrition-related knowledge deficit related to pt unsure if diet is contributing to feeling sluggish as evidenced by pt report, request to see RD.     Nutrition Intervention  We reviewed several components of his diet. Encouraged pt to trial and error a few parts of his diet to see if this helps with his energy levels and keeping a food / sx journal. We discussed timing of his meals (seems appropriate), composition of meals (very high protein intake, perhaps not enough carbohydrate), splitting his protein shakes into two to maximize protein absorption/utlization (cannot absorb ~50 g protein at one moment in time). Discussed calorie counting and estimated needs per above. For weight loss, could focus energy at the gym slightly more on cardio activities, reducing weight lifting. Encouraged pt to reduce his protein intake and would not exceed much more than 120 g/day. Discussed need for some fat in diet and reviewed healthy fats. Discussed increasing  carbohydrate intake somewhat (could mix fruit into protein shake). Discussed some potential GI/nutrition side effects of zoloft (weight loss, dry mouth, upset stomach, appetite changes, etc) so although he may implement various diet modifications, medication may make it difficult to differentiate b/t meds and diet.     I am not convinced that pt's diet is contributing to his sluggish feelings. Perhaps his HIV diagnosis and medication for this may be the root cause.     Patient Understanding: Pt verbalized understanding of education provided.  Expected Compliance: Good  Follow-Up Plans: PRN     Nutrition Goals  1. Maximum protein intake of 120 g/day  2. Weight loss <217 lbs per pt desire  3. Increase cardio and reduce weight lifting     Provided pt with contact info.   Chanel Dean RD, LD  [facility] [phone]

## 2019-01-19 NOTE — PROGRESS NOTES
[facility] Staff Note: Mr. Cope was seen, examined, and the case was discussed with Dr. Chávez, ID Fellow -- I agree with her interval history and examination, assessment and plan in this outpatient ID / HIV Progress note. This note reflects my observations and opinions, and the plan outlined fully reflects my approach. I have reviewed the available history, radiology, laboratory results, and reports with the Fellow.

## 2019-01-31 ENCOUNTER — MYC MEDICAL ADVICE (OUTPATIENT)
Dept: INFECTIOUS DISEASES | Facility: CLINIC | Age: 24
End: 2019-01-31

## 2019-01-31 DIAGNOSIS — F32.0 CURRENT MILD EPISODE OF MAJOR DEPRESSIVE DISORDER WITHOUT PRIOR EPISODE (H): ICD-10-CM

## 2019-01-31 NOTE — TELEPHONE ENCOUNTER
Contacted patient by phone Adis to see how he was doing with sertraline.  He had increased dose to 50mg daily as we had discussed at his appointment and felt much improved in terms of sleep and anxiety.  Requested refill at that higher dose, which I ordered.  Will be picking up refill of his Triumeq this weekend.    Lesia Chávez MD, PhD  Adult & Pediatric Infectious Diseases Fellow PGY7, [facility]  Pager: [phone]

## 2019-02-28 ENCOUNTER — TELEPHONE (OUTPATIENT)
Dept: PHARMACY | Facility: CLINIC | Age: 24
End: 2019-02-28

## 2019-02-28 NOTE — TELEPHONE ENCOUNTER
Attempted to contact the patient to for refill reminder call,  left message on voicemail. Will fill and put on shelf.     Follow-up Date: 03/29/19    Shawna Yarbrough [facility]  [phone]

## 2019-03-03 ENCOUNTER — MYC REFILL (OUTPATIENT)
Dept: INFECTIOUS DISEASES | Facility: CLINIC | Age: 24
End: 2019-03-03

## 2019-03-03 DIAGNOSIS — Z21 HUMAN IMMUNODEFICIENCY VIRUS I INFECTION (H): ICD-10-CM

## 2019-03-03 DIAGNOSIS — F32.0 CURRENT MILD EPISODE OF MAJOR DEPRESSIVE DISORDER WITHOUT PRIOR EPISODE (H): ICD-10-CM

## 2019-03-04 ENCOUNTER — MYC REFILL (OUTPATIENT)
Dept: INFECTIOUS DISEASES | Facility: CLINIC | Age: 24
End: 2019-03-04

## 2019-03-04 DIAGNOSIS — F32.0 CURRENT MILD EPISODE OF MAJOR DEPRESSIVE DISORDER WITHOUT PRIOR EPISODE (H): ICD-10-CM

## 2019-03-04 DIAGNOSIS — Z21 HUMAN IMMUNODEFICIENCY VIRUS I INFECTION (H): ICD-10-CM

## 2019-03-24 ENCOUNTER — OFFICE VISIT (OUTPATIENT)
Dept: URGENT CARE | Facility: URGENT CARE | Age: 24
End: 2019-03-24
Payer: COMMERCIAL

## 2019-03-24 VITALS
RESPIRATION RATE: 16 BRPM | WEIGHT: 200 LBS | OXYGEN SATURATION: 97 % | SYSTOLIC BLOOD PRESSURE: 134 MMHG | BODY MASS INDEX: 28.7 KG/M2 | HEART RATE: 87 BPM | DIASTOLIC BLOOD PRESSURE: 78 MMHG | TEMPERATURE: 99.4 F

## 2019-03-24 DIAGNOSIS — J02.9 SORE THROAT: Primary | ICD-10-CM

## 2019-03-24 DIAGNOSIS — J02.0 STREP THROAT: ICD-10-CM

## 2019-03-24 DIAGNOSIS — Z21 HUMAN IMMUNODEFICIENCY VIRUS I INFECTION (H): ICD-10-CM

## 2019-03-24 LAB
DEPRECATED S PYO AG THROAT QL EIA: ABNORMAL
SPECIMEN SOURCE: ABNORMAL

## 2019-03-24 PROCEDURE — 99214 OFFICE O/P EST MOD 30 MIN: CPT | Performed by: FAMILY MEDICINE

## 2019-03-24 PROCEDURE — 87880 STREP A ASSAY W/OPTIC: CPT | Performed by: FAMILY MEDICINE

## 2019-03-24 RX ORDER — PENICILLIN V POTASSIUM 500 MG/1
500 TABLET, FILM COATED ORAL 2 TIMES DAILY
Qty: 20 TABLET | Refills: 0 | Status: SHIPPED | OUTPATIENT
Start: 2019-03-24 | End: 2019-04-17

## 2019-03-24 NOTE — LETTER
[facility]                                                                   3/24/2019  [address], [place]  [address]  [phone]      To Whom It May Concern,      Iker Berrios was seen in our clinic today and treated for illness.    Please excuse Iker from school 3/25-3/26/2019.        Please call our clinic with any questions or concerns.            Sincerely,       Kiko Ramirez MD

## 2019-03-24 NOTE — PROGRESS NOTES
Iker Berrios with presents with 7 days symptoms including sore throat, right ear pain, achiness, chills.    No cough but some congestion nasally.     Treatment measures tried include Antihistamine and Fluids.  No  Exposure   No  recent travel     The patient has a history of HIV disease treated with cd4 count over 800 recently    Current Outpatient Medications   Medication Sig Dispense Refill     penicillin V (VEETID) 500 MG tablet Take 1 tablet (500 mg) by mouth 2 times daily 20 tablet 0     abacavir-dolutegravir-LamiVUDine (TRIUMEQ) 600- MG per tablet Take 1 tablet by mouth daily 30 tablet 10     sertraline (ZOLOFT) 50 MG tablet Take 1 tablet (50 mg) by mouth daily (Patient not taking: Reported on 3/24/2019) 30 tablet 1       ROS otherwise negative for resp., ID,  HEENT symptoms.    Objective: /78 (BP Location: Left arm, Cuff Size: Adult Large)   Pulse 87   Temp 99.4  F (37.4  C) (Oral)   Resp 16   Wt 90.7 kg (200 lb)   SpO2 97%   BMI 28.70 kg/m    Exam:  GENERAL APPEARANCE: healthy, alert and no distress  EYES: Eyes grossly normal to inspection  HENT: ear canals and TM's normal, nose and mouth without ulcers or lesions, tonsillar hypertrophy and tonsillar erythema  NECK: no adenopathy, no asymmetry, masses, or scars and thyroid normal to palpation  RESP: lungs clear to auscultation - no rales, rhonchi or wheezes  CV: regular rates and rhythm, normal S1 S2, no S3 or S4 and no murmur, click or rub -     Results for orders placed or performed in visit on 03/24/19   Strep, Rapid Screen   Result Value Ref Range    Specimen Description Throat     Rapid Strep A Screen (A)      POSITIVE: Group A Streptococcal antigen detected by immunoassay.        ICD-10-CM    1. Sore throat J02.9 Strep, Rapid Screen   2. Strep throat J02.0 penicillin V (VEETID) 500 MG tablet   3. Human immunodeficiency virus I infection (H) B20    strep complicated by HIV disease. Symptomatic therapy and follow up discussed. Use of  OTC  meds. discussed . No sign abscess.

## 2019-03-28 ENCOUNTER — TELEPHONE (OUTPATIENT)
Dept: PHARMACY | Facility: CLINIC | Age: 24
End: 2019-03-28

## 2019-03-31 ENCOUNTER — MYC REFILL (OUTPATIENT)
Dept: INFECTIOUS DISEASES | Facility: CLINIC | Age: 24
End: 2019-03-31

## 2019-03-31 DIAGNOSIS — Z21 HUMAN IMMUNODEFICIENCY VIRUS I INFECTION (H): ICD-10-CM

## 2019-04-17 ENCOUNTER — OFFICE VISIT (OUTPATIENT)
Dept: INFECTIOUS DISEASES | Facility: CLINIC | Age: 24
End: 2019-04-17
Attending: INTERNAL MEDICINE
Payer: COMMERCIAL

## 2019-04-17 VITALS
BODY MASS INDEX: 28.89 KG/M2 | SYSTOLIC BLOOD PRESSURE: 118 MMHG | DIASTOLIC BLOOD PRESSURE: 75 MMHG | HEART RATE: 56 BPM | TEMPERATURE: 98.2 F | WEIGHT: 201.8 LBS | HEIGHT: 70 IN

## 2019-04-17 DIAGNOSIS — Z21 HUMAN IMMUNODEFICIENCY VIRUS I INFECTION (H): ICD-10-CM

## 2019-04-17 DIAGNOSIS — Z21 ASYMPTOMATIC HUMAN IMMUNODEFICIENCY VIRUS (HIV) INFECTION STATUS (H): ICD-10-CM

## 2019-04-17 DIAGNOSIS — Z21 HUMAN IMMUNODEFICIENCY VIRUS I INFECTION (H): Primary | ICD-10-CM

## 2019-04-17 DIAGNOSIS — Z21 ASYMPTOMATIC HUMAN IMMUNODEFICIENCY VIRUS (HIV) INFECTION STATUS (H): Primary | ICD-10-CM

## 2019-04-17 DIAGNOSIS — Z86.59 HISTORY OF DEPRESSION: ICD-10-CM

## 2019-04-17 DIAGNOSIS — Z00.00 HEALTHCARE MAINTENANCE: ICD-10-CM

## 2019-04-17 LAB
ALBUMIN SERPL-MCNC: 4.1 G/DL (ref 3.4–5)
ALP SERPL-CCNC: 78 U/L (ref 40–150)
ALT SERPL W P-5'-P-CCNC: 30 U/L (ref 0–70)
ANION GAP SERPL CALCULATED.3IONS-SCNC: 3 MMOL/L (ref 3–14)
AST SERPL W P-5'-P-CCNC: 32 U/L (ref 0–45)
BASOPHILS # BLD AUTO: 0 10E9/L (ref 0–0.2)
BASOPHILS NFR BLD AUTO: 0.8 %
BILIRUB SERPL-MCNC: 0.3 MG/DL (ref 0.2–1.3)
BUN SERPL-MCNC: 27 MG/DL (ref 7–30)
CALCIUM SERPL-MCNC: 9.4 MG/DL (ref 8.5–10.1)
CHLORIDE SERPL-SCNC: 104 MMOL/L (ref 94–109)
CO2 SERPL-SCNC: 28 MMOL/L (ref 20–32)
CREAT SERPL-MCNC: 1.31 MG/DL (ref 0.66–1.25)
DIFFERENTIAL METHOD BLD: ABNORMAL
EOSINOPHIL # BLD AUTO: 0.1 10E9/L (ref 0–0.7)
EOSINOPHIL NFR BLD AUTO: 1.5 %
ERYTHROCYTE [DISTWIDTH] IN BLOOD BY AUTOMATED COUNT: 14.7 % (ref 10–15)
GFR SERPL CREATININE-BSD FRML MDRD: 75 ML/MIN/{1.73_M2}
GLUCOSE SERPL-MCNC: 81 MG/DL (ref 70–99)
HCT VFR BLD AUTO: 46.5 % (ref 40–53)
HGB BLD-MCNC: 14.2 G/DL (ref 13.3–17.7)
IMM GRANULOCYTES # BLD: 0 10E9/L (ref 0–0.4)
IMM GRANULOCYTES NFR BLD: 0.4 %
LYMPHOCYTES # BLD AUTO: 2.6 10E9/L (ref 0.8–5.3)
LYMPHOCYTES NFR BLD AUTO: 48.9 %
MCH RBC QN AUTO: 23.5 PG (ref 26.5–33)
MCHC RBC AUTO-ENTMCNC: 30.5 G/DL (ref 31.5–36.5)
MCV RBC AUTO: 77 FL (ref 78–100)
MONOCYTES # BLD AUTO: 0.6 10E9/L (ref 0–1.3)
MONOCYTES NFR BLD AUTO: 11.2 %
NEUTROPHILS # BLD AUTO: 2 10E9/L (ref 1.6–8.3)
NEUTROPHILS NFR BLD AUTO: 37.2 %
NRBC # BLD AUTO: 0 10*3/UL
NRBC BLD AUTO-RTO: 0 /100
PLATELET # BLD AUTO: 273 10E9/L (ref 150–450)
POTASSIUM SERPL-SCNC: 3.6 MMOL/L (ref 3.4–5.3)
PROT SERPL-MCNC: 8.2 G/DL (ref 6.8–8.8)
RBC # BLD AUTO: 6.04 10E12/L (ref 4.4–5.9)
SODIUM SERPL-SCNC: 136 MMOL/L (ref 133–144)
WBC # BLD AUTO: 5.3 10E9/L (ref 4–11)

## 2019-04-17 PROCEDURE — 86360 T CELL ABSOLUTE COUNT/RATIO: CPT | Performed by: INTERNAL MEDICINE

## 2019-04-17 PROCEDURE — G0463 HOSPITAL OUTPT CLINIC VISIT: HCPCS | Mod: 25,ZF

## 2019-04-17 PROCEDURE — 90472 IMMUNIZATION ADMIN EACH ADD: CPT | Mod: ZF

## 2019-04-17 PROCEDURE — 86359 T CELLS TOTAL COUNT: CPT | Performed by: INTERNAL MEDICINE

## 2019-04-17 PROCEDURE — 90651 9VHPV VACCINE 2/3 DOSE IM: CPT | Mod: ZF | Performed by: INTERNAL MEDICINE

## 2019-04-17 PROCEDURE — 80053 COMPREHEN METABOLIC PANEL: CPT | Performed by: INTERNAL MEDICINE

## 2019-04-17 PROCEDURE — 25000128 H RX IP 250 OP 636: Mod: ZF | Performed by: INTERNAL MEDICINE

## 2019-04-17 PROCEDURE — 87536 HIV-1 QUANT&REVRSE TRNSCRPJ: CPT | Performed by: INTERNAL MEDICINE

## 2019-04-17 PROCEDURE — 90670 PCV13 VACCINE IM: CPT | Mod: ZF | Performed by: INTERNAL MEDICINE

## 2019-04-17 PROCEDURE — 85025 COMPLETE CBC W/AUTO DIFF WBC: CPT | Performed by: INTERNAL MEDICINE

## 2019-04-17 PROCEDURE — G0009 ADMIN PNEUMOCOCCAL VACCINE: HCPCS | Mod: ZF

## 2019-04-17 PROCEDURE — 36415 COLL VENOUS BLD VENIPUNCTURE: CPT | Performed by: INTERNAL MEDICINE

## 2019-04-17 PROCEDURE — 87591 N.GONORRHOEAE DNA AMP PROB: CPT | Performed by: INTERNAL MEDICINE

## 2019-04-17 PROCEDURE — 86803 HEPATITIS C AB TEST: CPT | Performed by: INTERNAL MEDICINE

## 2019-04-17 PROCEDURE — 25000581 ZZH RX MED A9270 GY (STAT IND- M) 250: Mod: ZF | Performed by: INTERNAL MEDICINE

## 2019-04-17 PROCEDURE — 0064U ANTB TP TOTAL&RPR IA QUAL: CPT | Performed by: INTERNAL MEDICINE

## 2019-04-17 PROCEDURE — 87491 CHLMYD TRACH DNA AMP PROBE: CPT | Performed by: INTERNAL MEDICINE

## 2019-04-17 RX ADMIN — PNEUMOCOCCAL 13-VALENT CONJUGATE VACCINE 0.5 ML: 2.2; 2.2; 2.2; 2.2; 2.2; 4.4; 2.2; 2.2; 2.2; 2.2; 2.2; 2.2; 2.2 INJECTION, SUSPENSION INTRAMUSCULAR at 18:20

## 2019-04-17 RX ADMIN — HUMAN PAPILLOMAVIRUS 9-VALENT VACCINE, RECOMBINANT 0.5 ML: 30; 40; 60; 40; 20; 20; 20; 20; 20 INJECTION, SUSPENSION INTRAMUSCULAR at 18:19

## 2019-04-17 ASSESSMENT — ENCOUNTER SYMPTOMS
SORE THROAT: 1
TROUBLE SWALLOWING: 1
BRUISES/BLEEDS EASILY: 0
SWOLLEN GLANDS: 1
TASTE DISTURBANCE: 0
SINUS CONGESTION: 0
HOARSE VOICE: 0
SINUS PAIN: 0
SMELL DISTURBANCE: 0
NECK MASS: 0

## 2019-04-17 ASSESSMENT — PAIN SCALES - GENERAL: PAINLEVEL: NO PAIN (0)

## 2019-04-17 ASSESSMENT — PATIENT HEALTH QUESTIONNAIRE - PHQ9: SUM OF ALL RESPONSES TO PHQ QUESTIONS 1-9: 8

## 2019-04-17 ASSESSMENT — MIFFLIN-ST. JEOR: SCORE: 1906.61

## 2019-04-17 NOTE — NURSING NOTE
"/75   Pulse 56   Temp 98.2  F (36.8  C) (Oral)   Ht 1.778 m (5' 10\")   Wt 91.5 kg (201 lb 12.8 oz)   BMI 28.96 kg/m    Chief Complaint   Patient presents with     RECHECK     follow up with cherelle GRACE cma       "

## 2019-04-17 NOTE — PROGRESS NOTES
[facility]  Infectious Disease Clinic Note     Patient:  Iker Berrios, Date of birth 1995, Medical record number [id number]  Date of Visit: 11/14/2018         Assessment and Plan:   Assessment/Plan:  Iker Berrios is a 24 y/o man with well-controlled HIV and history of recent gonorrhea and chlamydia who presents for routine HIV care.  He transferred care from Dr. Rose after she left the clinic.  Depressive symptoms at last visit are significantly improved today.    # HIV: Infected sometime in 2017 or 2018, likely March 2018 when he had viral syndrome.  Scott CD4 493, now 954 on Triumeq.  - Continue Triumeq.  Reinforced his already excellent adherence  - Routine HIV labs today  - STI testing screening - throat, urine, rectal GC/chlamydia, syphilis and HCV serology negative    # Major depression: PHQ9 was 20 at last visit, now 8 today.  Denies thoughts of self harm.  Is already seeing a counselor.  Stopped sertraline due to sexual side effects and diarrhea  - Continue counseling.  Not interested in medications at this time.    Preventative health  - Vaccines: Due for 3rd dose of  HPV vaccine today  Up to date on seasonal influenza, meningococcal.  HBV and HAV immune. PCV13 given today    Return in 3 months for routine HIV care or sooner if needed.    Patient discussed with Dr. Colin Chávez MD, PhD  Adult & Pediatric Infectious Diseases Fellow PGY7, [facility]  Pager: [phone]    Interval History   Last seen by me on 1/16/19.  Missed on 2 doses in past 3 months.  PHQ9 is now down to 8 from 20.  Having diarrhea and no sex drive on zoloft.  Continue counseling.  Had strep throat at end of March.  1 partner in past 3 months.  Is on a fair amount of supplement.  Still in school.  Will be taking courses during summer semester and planning to walk in graduation next spring.  Hoping to start grad school in social work at the  after that.        HIV History:   Diagnosed  HIV positive: 6/19/2018 at [facility]   (Truvada for PrEP from 5949-0705. Tested HIV negative coming off of Truvada. He was noted to have a viral illness in March 2018 that last about 2 weeks)  Viral load at diagnosis: 2654  CD4 count at diagnosis: 783  ART start date: 7/9/2018  ARV History: Triumeq   HIV Genotype: 6/07/2018 - No resistance found   OI's: None to date     STI:   6/19/2018 GC/Chlamydia Negative, 6/27/2018 RPR Negative, HCV negative  11/8/2018: TPA Nonreactive  11/14/2018: GC/CT Urine and rectal Positive, GC Throat Positive   1/16/19: GC/CT pending, treponema pending    Serology:   Hepatitis A Reactive, Hepatitis B surface Antigen Non reactive, Hepatitis C antibody Non reactive   TB Quant Negative 6/27/18  CMV Positive  Toxo Negative  HLA B57:01 Negative     Past Medical History:   None    Social History:   No smoking   No alcohol   No illicit drugs  Sexual preference: MSM, both insertive and receptive, nonconsistent condom use.    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  Currently enrolled at the [employer] majoring in psychology            Review of Systems:   CONSTITUTIONAL:  No fevers or chills  EYES: negative for icterus  ENT:  negative for hearing loss, tinnitus and sore throat  RESPIRATORY:  negative for cough with sputum and dyspnea  CARDIOVASCULAR:  negative for chest pain, dyspnea  GASTROINTESTINAL:  negative for nausea, vomiting, diarrhea and constipation  GENITOURINARY:  negative for dysuria  HEME:  No easy bruising  INTEGUMENT:  negative for rash and pruritus  NEURO:  Negative for headache         Current Medications (antimicrobials listed in bold):     Current Outpatient Medications   Medication Sig Dispense Refill     abacavir-dolutegravir-LamiVUDine (TRIUMEQ) 600- MG per tablet Take 1 tablet by mouth daily 30 tablet 10          Allergies:   No Known Allergies       Physical Exam:   Vitals were reviewed.  All vitals stable  /75    "Pulse 56   Temp 98.2  F (36.8  C) (Oral)   Ht 1.778 m (5' 10\")   Wt 91.5 kg (201 lb 12.8 oz)   BMI 28.96 kg/m    Exam:  GENERAL:  well-developed, well-nourished, alert, oriented, in no acute distress.  HEENT:  Head is normocephalic, atraumatic     EYES:  Eyes have anicteric sclerae.    LUNGS: Non labored respiration   CARDIOVASCULAR:  Regular rate and rhythm with no murmurs, gallops or rubs.  ABDOMEN:  Normal bowel sounds, soft, nontender.  SKIN: No rashes  NEUROLOGIC:  Grossly nonfocal.         Laboratory Data:     CD4 counts    Absolute CD4   Date Value Ref Range Status   04/17/2019 954 441 - 2,156 cells/uL Final   01/16/2019 801 441 - 2,156 cells/uL Final   11/08/2018 899 441 - 2,156 cells/uL Final   10/12/2018 763 441 - 2,156 cells/uL Final   08/07/2018 493 441 - 2,156 cells/uL Final   06/25/2018 783 441 - 2,156 cells/uL Final     Metabolic Studies    Recent Labs   Lab Test 04/17/19  1700 01/16/19  1512 10/12/18  1251 08/07/18  1152 06/27/18  1447 03/07/18  1303    137 138 137 139 137   POTASSIUM 3.6 4.0 4.1 4.1 3.8 4.1   CHLORIDE 104 102 104 103 106 104   CO2 28 28 28 27 26 22   ANIONGAP 3 6 6 7 7 11   BUN 27 28 27 18 26 17   CR 1.31* 1.36* 1.43* 1.54* 1.15 1.57*   GFRESTIMATED 75 72 60* 55* 78 54*   GLC 81 83 84 78 73 88   MARIELY 9.4 9.5 8.9 9.4 9.0 8.2*     Hepatic Studies    Recent Labs   Lab Test 04/17/19  1700 01/16/19  1512 10/12/18  1251 08/07/18  1152 06/27/18  1447 03/07/18  1303   BILITOTAL 0.3 0.4 0.3 0.3 0.4 0.2   ALKPHOS 78 72 63 82 62 57   ALBUMIN 4.1 4.5 4.0 4.0 4.1 3.7   AST 32 43 40 29 39 63*   ALT 30 36 34 33 36 43     Pancreatitis testing    Recent Labs   Lab Test 11/08/18  1014 06/25/18  1415   AMYLASE  --  124*   LIPASE  --  160   TRIG 120  --      Hematology Studies    Recent Labs   Lab Test 04/17/19  1700 01/16/19  1512 11/08/18  1014 10/12/18  1251 08/07/18  1152 06/25/18  1415 03/07/18  1303   WBC 5.3 5.8 4.5 5.4 5.5 6.1 5.3   ANEU 2.0 2.3  --  2.5 3.1 2.4 3.9   ALYM 2.6 2.7  --  " 2.1 1.2 2.8 1.1   JANAE 0.6 0.7  --  0.7 0.9 0.7 0.3   AEOS 0.1 0.1  --  0.1 0.2 0.2 0.0   HGB 14.2 15.5 14.2 14.3 15.0 14.2 14.5   HCT 46.5 52.7 46.9 46.7 49.8 47.3 44.6   MCV 77* 77* 76* 76* 74* 74* 70*    285 254 280 274 300 148*           Answers for HPI/ROS submitted by the patient on 4/17/2019   General Symptoms: No  Skin Symptoms: No  HENT Symptoms: Yes  EYE SYMPTOMS: No  HEART SYMPTOMS: No  LUNG SYMPTOMS: No  INTESTINAL SYMPTOMS: No  URINARY SYMPTOMS: No  REPRODUCTIVE SYMPTOMS: No  SKELETAL SYMPTOMS: No  BLOOD SYMPTOMS: Yes  NERVOUS SYSTEM SYMPTOMS: No  MENTAL HEALTH SYMPTOMS: No  Ear pain: Yes  Ear discharge: No  Hearing loss: No  Tinnitus: No  Nosebleeds: No  Congestion: No  Sinus pain: No  Trouble swallowing: Yes   Voice hoarseness: No  Mouth sores: No  Sore throat: Yes  Tooth pain: No  Gum tenderness: No  Bleeding gums: No  Change in taste: No  Change in sense of smell: No  Dry mouth: No  Hearing aid used: No  Neck lump: No  Anemia: No  Swollen glands: Yes  Easy bleeding or bruising: No  Edema or swelling: No

## 2019-04-18 LAB
C TRACH DNA SPEC QL NAA+PROBE: NEGATIVE
CD3 CELLS # BLD: 1719 CELLS/UL (ref 603–2990)
CD3 CELLS NFR BLD: 71 % (ref 49–84)
CD3+CD4+ CELLS # BLD: 954 CELLS/UL (ref 441–2156)
CD3+CD4+ CELLS NFR BLD: 40 % (ref 28–63)
CD3+CD4+ CELLS/CD3+CD8+ CLL BLD: 1.43 % (ref 1.4–2.6)
CD3+CD8+ CELLS # BLD: 664 CELLS/UL (ref 125–1312)
CD3+CD8+ CELLS NFR BLD: 28 % (ref 10–40)
HCV AB SERPL QL IA: NONREACTIVE
IFC SPECIMEN: NORMAL
N GONORRHOEA DNA SPEC QL NAA+PROBE: NEGATIVE
SPECIMEN SOURCE: NORMAL
T PALLIDUM AB SER QL: NONREACTIVE

## 2019-04-19 LAB
HIV1 RNA # PLAS NAA DL=20: <20 {COPIES}/ML
HIV1 RNA SERPL NAA+PROBE-LOG#: <1.3 {LOG_COPIES}/ML

## 2019-04-21 PROBLEM — Z86.59 HISTORY OF DEPRESSION: Status: ACTIVE | Noted: 2019-04-17

## 2019-04-21 PROBLEM — Z86.59 HISTORY OF DEPRESSION: Status: ACTIVE | Noted: 2019-04-21

## 2019-04-22 NOTE — PROGRESS NOTES
[facility] Staff Note: Mr. Berrios was seen, examined, and the case was discussed with Dr. Chávez, ID Fellow -- I agree with her interval history and examination, assessment and plan in this outpatient ID / HIV Progress note. This note reflects my observations and opinions, and the plan outlined fully reflects my approach. I have reviewed the available history, radiology, laboratory results, and reports with the Fellow.

## 2019-07-11 ENCOUNTER — TELEPHONE (OUTPATIENT)
Dept: INFECTIOUS DISEASES | Facility: CLINIC | Age: 24
End: 2019-07-11

## 2019-08-15 ENCOUNTER — MYC MEDICAL ADVICE (OUTPATIENT)
Dept: INFECTIOUS DISEASES | Facility: CLINIC | Age: 24
End: 2019-08-15

## 2019-08-15 DIAGNOSIS — Z11.3 SCREENING FOR STD (SEXUALLY TRANSMITTED DISEASE): ICD-10-CM

## 2019-08-15 DIAGNOSIS — Z21 HUMAN IMMUNODEFICIENCY VIRUS I INFECTION (H): Primary | ICD-10-CM

## 2019-08-15 DIAGNOSIS — A74.9 CHLAMYDIA: Primary | ICD-10-CM

## 2019-08-15 NOTE — PROGRESS NOTES
Pt requesting all STI Screens.  Per [facility] Ambulatory Care Protocol, Pt is due for routine labs based on disease state or monitoring of medications. Lab orders entered per clinic protocol.  Sherie Yates RN

## 2019-08-16 ENCOUNTER — ALLIED HEALTH/NURSE VISIT (OUTPATIENT)
Dept: INFECTIOUS DISEASES | Facility: CLINIC | Age: 24
End: 2019-08-16
Payer: COMMERCIAL

## 2019-08-16 DIAGNOSIS — Z11.3 SCREENING FOR STD (SEXUALLY TRANSMITTED DISEASE): ICD-10-CM

## 2019-08-16 DIAGNOSIS — Z11.3 SCREENING FOR STD (SEXUALLY TRANSMITTED DISEASE): Primary | ICD-10-CM

## 2019-08-16 DIAGNOSIS — Z21 HUMAN IMMUNODEFICIENCY VIRUS I INFECTION (H): ICD-10-CM

## 2019-08-16 LAB
ALBUMIN SERPL-MCNC: 3.9 G/DL (ref 3.4–5)
ALP SERPL-CCNC: 71 U/L (ref 40–150)
ALT SERPL W P-5'-P-CCNC: 45 U/L (ref 0–70)
ANION GAP SERPL CALCULATED.3IONS-SCNC: 4 MMOL/L (ref 3–14)
AST SERPL W P-5'-P-CCNC: 36 U/L (ref 0–45)
BASOPHILS # BLD AUTO: 0 10E9/L (ref 0–0.2)
BASOPHILS NFR BLD AUTO: 0.6 %
BILIRUB SERPL-MCNC: 0.4 MG/DL (ref 0.2–1.3)
BUN SERPL-MCNC: 25 MG/DL (ref 7–30)
CALCIUM SERPL-MCNC: 8.8 MG/DL (ref 8.5–10.1)
CD3 CELLS # BLD: 1612 CELLS/UL (ref 603–2990)
CD3 CELLS NFR BLD: 72 % (ref 49–84)
CD3+CD4+ CELLS # BLD: 888 CELLS/UL (ref 441–2156)
CD3+CD4+ CELLS NFR BLD: 39 % (ref 28–63)
CD3+CD4+ CELLS/CD3+CD8+ CLL BLD: 1.5 % (ref 1.4–2.6)
CD3+CD8+ CELLS # BLD: 580 CELLS/UL (ref 125–1312)
CD3+CD8+ CELLS NFR BLD: 26 % (ref 10–40)
CHLORIDE SERPL-SCNC: 107 MMOL/L (ref 94–109)
CO2 SERPL-SCNC: 27 MMOL/L (ref 20–32)
CREAT SERPL-MCNC: 1.37 MG/DL (ref 0.66–1.25)
DIFFERENTIAL METHOD BLD: ABNORMAL
EOSINOPHIL # BLD AUTO: 0.1 10E9/L (ref 0–0.7)
EOSINOPHIL NFR BLD AUTO: 1.6 %
ERYTHROCYTE [DISTWIDTH] IN BLOOD BY AUTOMATED COUNT: 14.8 % (ref 10–15)
GFR SERPL CREATININE-BSD FRML MDRD: 71 ML/MIN/{1.73_M2}
GLUCOSE SERPL-MCNC: 77 MG/DL (ref 70–99)
HCT VFR BLD AUTO: 45.7 % (ref 40–53)
HGB BLD-MCNC: 14.1 G/DL (ref 13.3–17.7)
IFC SPECIMEN: NORMAL
IMM GRANULOCYTES # BLD: 0.1 10E9/L (ref 0–0.4)
IMM GRANULOCYTES NFR BLD: 0.9 %
LYMPHOCYTES # BLD AUTO: 2.1 10E9/L (ref 0.8–5.3)
LYMPHOCYTES NFR BLD AUTO: 30.6 %
MCH RBC QN AUTO: 23.8 PG (ref 26.5–33)
MCHC RBC AUTO-ENTMCNC: 30.9 G/DL (ref 31.5–36.5)
MCV RBC AUTO: 77 FL (ref 78–100)
MONOCYTES # BLD AUTO: 0.9 10E9/L (ref 0–1.3)
MONOCYTES NFR BLD AUTO: 12.4 %
NEUTROPHILS # BLD AUTO: 3.7 10E9/L (ref 1.6–8.3)
NEUTROPHILS NFR BLD AUTO: 53.9 %
NRBC # BLD AUTO: 0 10*3/UL
NRBC BLD AUTO-RTO: 0 /100
PLATELET # BLD AUTO: 244 10E9/L (ref 150–450)
POTASSIUM SERPL-SCNC: 4 MMOL/L (ref 3.4–5.3)
PROT SERPL-MCNC: 7.7 G/DL (ref 6.8–8.8)
RBC # BLD AUTO: 5.93 10E12/L (ref 4.4–5.9)
SODIUM SERPL-SCNC: 138 MMOL/L (ref 133–144)
T PALLIDUM AB SER QL: NONREACTIVE
WBC # BLD AUTO: 6.8 10E9/L (ref 4–11)

## 2019-08-16 PROCEDURE — 36415 COLL VENOUS BLD VENIPUNCTURE: CPT | Performed by: INTERNAL MEDICINE

## 2019-08-16 PROCEDURE — 86360 T CELL ABSOLUTE COUNT/RATIO: CPT | Performed by: INTERNAL MEDICINE

## 2019-08-16 PROCEDURE — 87536 HIV-1 QUANT&REVRSE TRNSCRPJ: CPT | Performed by: INTERNAL MEDICINE

## 2019-08-16 PROCEDURE — 87591 N.GONORRHOEAE DNA AMP PROB: CPT | Performed by: INTERNAL MEDICINE

## 2019-08-16 PROCEDURE — 86359 T CELLS TOTAL COUNT: CPT | Performed by: INTERNAL MEDICINE

## 2019-08-16 PROCEDURE — 86780 TREPONEMA PALLIDUM: CPT | Performed by: INTERNAL MEDICINE

## 2019-08-16 PROCEDURE — 87491 CHLMYD TRACH DNA AMP PROBE: CPT | Performed by: INTERNAL MEDICINE

## 2019-08-16 PROCEDURE — 80053 COMPREHEN METABOLIC PANEL: CPT | Performed by: INTERNAL MEDICINE

## 2019-08-16 PROCEDURE — 85025 COMPLETE CBC W/AUTO DIFF WBC: CPT | Performed by: INTERNAL MEDICINE

## 2019-08-18 LAB
C TRACH DNA SPEC QL NAA+PROBE: NEGATIVE
C TRACH DNA SPEC QL NAA+PROBE: POSITIVE
C TRACH DNA SPEC QL NAA+PROBE: POSITIVE
N GONORRHOEA DNA SPEC QL NAA+PROBE: NEGATIVE
SPECIMEN SOURCE: ABNORMAL
SPECIMEN SOURCE: ABNORMAL
SPECIMEN SOURCE: NORMAL

## 2019-08-21 RX ORDER — AZITHROMYCIN 500 MG/1
1000 TABLET, FILM COATED ORAL ONCE
Qty: 2 TABLET | Refills: 0 | Status: SHIPPED | OUTPATIENT
Start: 2019-08-21 | End: 2019-08-21

## 2019-09-10 ENCOUNTER — TELEPHONE (OUTPATIENT)
Dept: INFECTIOUS DISEASES | Facility: CLINIC | Age: 24
End: 2019-09-10

## 2019-09-10 NOTE — TELEPHONE ENCOUNTER
Left message for pt reminding them of upcoming appointment.  Instructed pt to bring updated medications list.  cherelle lemon

## 2019-09-11 ENCOUNTER — OFFICE VISIT (OUTPATIENT)
Dept: INFECTIOUS DISEASES | Facility: CLINIC | Age: 24
End: 2019-09-11
Attending: INTERNAL MEDICINE
Payer: COMMERCIAL

## 2019-09-11 VITALS
DIASTOLIC BLOOD PRESSURE: 78 MMHG | SYSTOLIC BLOOD PRESSURE: 122 MMHG | HEART RATE: 78 BPM | WEIGHT: 209 LBS | TEMPERATURE: 98.3 F | BODY MASS INDEX: 29.99 KG/M2 | OXYGEN SATURATION: 94 %

## 2019-09-11 DIAGNOSIS — Z21 ASYMPTOMATIC HUMAN IMMUNODEFICIENCY VIRUS (HIV) INFECTION STATUS (H): ICD-10-CM

## 2019-09-11 DIAGNOSIS — Z21 HUMAN IMMUNODEFICIENCY VIRUS I INFECTION (H): Primary | ICD-10-CM

## 2019-09-11 DIAGNOSIS — F32.89 OTHER DEPRESSION: ICD-10-CM

## 2019-09-11 PROCEDURE — G0463 HOSPITAL OUTPT CLINIC VISIT: HCPCS | Mod: ZF

## 2019-09-11 ASSESSMENT — PAIN SCALES - GENERAL: PAINLEVEL: NO PAIN (0)

## 2019-09-11 NOTE — NURSING NOTE
Chief Complaint   Patient presents with     RECHECK     B20         /78 (BP Location: Right arm, Patient Position: Sitting, Cuff Size: Adult Large)   Pulse 78   Temp 98.3  F (36.8  C) (Oral)   Wt 94.8 kg (209 lb)   BMI 29.99 kg/m      Angelique Torrez CMA CMA    9/11/2019 3:35 PM

## 2019-09-11 NOTE — PROGRESS NOTES
"[facility]  Infectious Disease Clinic Note     Patient:  Iker Berrios, Date of birth 1995, Medical record number [id number]  Date of Visit: 9/11/19         Assessment and Plan:   Assessment/Plan:  Iker Berrios is a 24 y/o man with well-controlled HIV and history of recent treated chlamydia who presents for routine HIV care.  He transferred care from Dr. Rose after she left the clinic.  He had been followed for significant depression that is currently in remission.    # HIV: Infected sometime in 2017 or 2018, likely March 2018 when he had viral syndrome.  Scott CD4 493, now >800 on Triumeq.  - Continue Triumeq.  Reinforced his already excellent adherence  - Routine HIV labs checked in med-August and supressed viral load and acceptable safety labs.  - STI testing screening in August after contacted by partner with chlamydia.  He was positive, treated with azithromycin and had follow up negative test at [place].  - Future orders placed for lab draw in advance of next visit    # Major depression: PHQ9 was 20 at last visit, down to 8 after starting treatment.  Stopped sertraline at one point due to sexual side effects and diarrhea but he resumed and is currently taking.  Counseling ended because he was doing well.  - Refilled sertraline    Preventative health  - Vaccines: Completed HPV series and PCV13.  Up to date on seasonal influenza, meningococcal.  HBV and HAV immune.      Return in 6 months for routine HIV care or sooner if needed.    Patient discussed with Dr. Colin Chávez MD, PhD  Adult & Pediatric Infectious Diseases Fellow PGY8, [facility]  Pager: [phone]    Interval History   Last seen by me on 4/17/19.  Missed on 2 doses since last visit.  Started taking sertraline again and mental health is doing well.  \"terminated\" counseling due to no longer needing it.  Will be taking courses during summer semester and planning to walk in graduation next spring.  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  Has a couple of regular male partners who are also having multiple other partners.  Notified that one of them had chlamydia.  He tested positive and was treated with azithromycin.  Was \"nervous\" so went in for retesting a week later at [facility] and was negative.          HIV History:   Diagnosed HIV positive: 6/19/2018 at [facility]   (Truvada for PrEP from 9099-9259. Tested HIV negative coming off of Truvada. He was noted to have a viral illness in March 2018 that last about 2 weeks)  Viral load at diagnosis: 2654  CD4 count at diagnosis: 783  ART start date: 7/9/2018  ARV History: Triumeq   HIV Genotype: 6/07/2018 - No resistance found   OI's: None to date     STI:   6/19/2018 GC/Chlamydia Negative, 6/27/2018 RPR Negative, HCV negative  11/8/2018: TPA Nonreactive  11/14/2018: GC/CT Urine and rectal Positive, GC Throat Positive   1/16/19: GC/CT, treponema negative  4/17/19: GC/CT, treponema negative  8/16/19: GC negative, CT positive from urine and rectum, treponema negative      Serology:   Hepatitis A Reactive, Hepatitis B surface Antigen Non reactive, Hepatitis C antibody Non reactive   TB Quant Negative 6/27/18  CMV Positive  Toxo Negative  HLA B57:01 Negative     Past Medical History:   None    Social History:   No smoking   No alcohol   No illicit drugs  Sexual preference: MSM, both insertive and receptive, nonconsistent condom use.    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            Review of Systems:   CONSTITUTIONAL:  No fevers or chills  EYES: negative for icterus  ENT:  negative for hearing loss, tinnitus and sore throat  RESPIRATORY:  negative for cough with sputum and dyspnea  CARDIOVASCULAR:  negative for chest pain, dyspnea  GASTROINTESTINAL:  negative for nausea, vomiting, diarrhea and constipation  GENITOURINARY:  negative for " dysuria  HEME:  No easy bruising  INTEGUMENT:  negative for rash and pruritus  NEURO:  Negative for headache         Current Medications (antimicrobials listed in bold):     Current Outpatient Medications   Medication Sig Dispense Refill     abacavir-dolutegravir-LamiVUDine (TRIUMEQ) 600- MG per tablet Take 1 tablet by mouth daily 30 tablet 10     sertraline (ZOLOFT) 50 MG tablet Take 50 mg by mouth daily  1          Allergies:   No Known Allergies       Physical Exam:   Vitals were reviewed.  All vitals stable  /78 (BP Location: Right arm, Patient Position: Sitting, Cuff Size: Adult Large)   Pulse 78   Temp 98.3  F (36.8  C) (Oral)   Wt 94.8 kg (209 lb)   SpO2 94%   BMI 29.99 kg/m    Exam:  GENERAL:  well-developed, well-nourished, alert, oriented, in no acute distress.  HEENT:  Head is normocephalic, atraumatic     EYES:  Eyes have anicteric sclerae.    LUNGS: Non labored respiration   CARDIOVASCULAR:  Regular rate and rhythm with no murmurs, gallops or rubs.  ABDOMEN:  Normal bowel sounds, soft, nontender.  SKIN: No rashes  NEUROLOGIC:  Grossly nonfocal.         Laboratory Data:     CD4 counts    Absolute CD4   Date Value Ref Range Status   08/16/2019 888 441 - 2,156 cells/uL Final   04/17/2019 954 441 - 2,156 cells/uL Final   01/16/2019 801 441 - 2,156 cells/uL Final   11/08/2018 899 441 - 2,156 cells/uL Final   10/12/2018 763 441 - 2,156 cells/uL Final   08/07/2018 493 441 - 2,156 cells/uL Final   06/25/2018 783 441 - 2,156 cells/uL Final     Metabolic Studies    Recent Labs   Lab Test 08/16/19  0925 04/17/19  1700 01/16/19  1512 10/12/18  1251 08/07/18  1152 06/27/18  1447    136 137 138 137 139   POTASSIUM 4.0 3.6 4.0 4.1 4.1 3.8   CHLORIDE 107 104 102 104 103 106   CO2 27 28 28 28 27 26   ANIONGAP 4 3 6 6 7 7   BUN 25 27 28 27 18 26   CR 1.37* 1.31* 1.36* 1.43* 1.54* 1.15   GFRESTIMATED 71 75 72 60* 55* 78   GLC 77 81 83 84 78 73   MARIELY 8.8 9.4 9.5 8.9 9.4 9.0     Hepatic Studies     Recent Labs   Lab Test 08/16/19  0925 04/17/19  1700 01/16/19  1512 10/12/18  1251 08/07/18  1152 06/27/18  1447   BILITOTAL 0.4 0.3 0.4 0.3 0.3 0.4   ALKPHOS 71 78 72 63 82 62   ALBUMIN 3.9 4.1 4.5 4.0 4.0 4.1   AST 36 32 43 40 29 39   ALT 45 30 36 34 33 36     Pancreatitis testing    Recent Labs   Lab Test 11/08/18  1014 06/25/18  1415   AMYLASE  --  124*   LIPASE  --  160   TRIG 120  --      Hematology Studies    Recent Labs   Lab Test 08/16/19  0925 04/17/19  1700 01/16/19  1512 11/08/18  1014 10/12/18  1251 08/07/18  1152 06/25/18  1415   WBC 6.8 5.3 5.8 4.5 5.4 5.5 6.1   ANEU 3.7 2.0 2.3  --  2.5 3.1 2.4   ALYM 2.1 2.6 2.7  --  2.1 1.2 2.8   JANAE 0.9 0.6 0.7  --  0.7 0.9 0.7   AEOS 0.1 0.1 0.1  --  0.1 0.2 0.2   HGB 14.1 14.2 15.5 14.2 14.3 15.0 14.2   HCT 45.7 46.5 52.7 46.9 46.7 49.8 47.3   MCV 77* 77* 77* 76* 76* 74* 74*    273 285 254 280 274 300

## 2019-09-15 NOTE — PROGRESS NOTES
[facility] Staff Note: Mr. Berrios was seen, examined, and the case was discussed with Dr. Chávez, ID Fellow -- I agree with her interval history and examination, assessment and plan in this outpatient ID / HIV Progress note. This note reflects my observations and opinions, and the plan outlined fully reflects my approach. I have reviewed the available history, radiology, laboratory results, and reports with the Fellow.

## 2019-11-11 ENCOUNTER — ALLIED HEALTH/NURSE VISIT (OUTPATIENT)
Dept: INFECTIOUS DISEASES | Facility: CLINIC | Age: 24
End: 2019-11-11
Payer: COMMERCIAL

## 2019-11-11 DIAGNOSIS — J02.9 SORE THROAT: ICD-10-CM

## 2019-11-11 DIAGNOSIS — Z11.3 SCREENING FOR STD (SEXUALLY TRANSMITTED DISEASE): ICD-10-CM

## 2019-11-11 DIAGNOSIS — Z11.3 SCREENING FOR STD (SEXUALLY TRANSMITTED DISEASE): Primary | ICD-10-CM

## 2019-11-11 DIAGNOSIS — Z21 ASYMPTOMATIC HUMAN IMMUNODEFICIENCY VIRUS (HIV) INFECTION STATUS (H): ICD-10-CM

## 2019-11-11 LAB
DEPRECATED S PYO AG THROAT QL EIA: ABNORMAL
SPECIMEN SOURCE: ABNORMAL

## 2019-11-11 PROCEDURE — 87591 N.GONORRHOEAE DNA AMP PROB: CPT | Performed by: INTERNAL MEDICINE

## 2019-11-11 PROCEDURE — 86780 TREPONEMA PALLIDUM: CPT | Performed by: INTERNAL MEDICINE

## 2019-11-11 PROCEDURE — 36415 COLL VENOUS BLD VENIPUNCTURE: CPT | Performed by: INTERNAL MEDICINE

## 2019-11-11 PROCEDURE — 87880 STREP A ASSAY W/OPTIC: CPT | Performed by: INTERNAL MEDICINE

## 2019-11-11 PROCEDURE — 87491 CHLMYD TRACH DNA AMP PROBE: CPT | Performed by: INTERNAL MEDICINE

## 2019-11-12 DIAGNOSIS — J02.0 STREP THROAT: Primary | ICD-10-CM

## 2019-11-12 DIAGNOSIS — A74.9 CHLAMYDIA: ICD-10-CM

## 2019-11-12 LAB
C TRACH DNA SPEC QL NAA+PROBE: NEGATIVE
C TRACH DNA SPEC QL NAA+PROBE: NEGATIVE
C TRACH DNA SPEC QL NAA+PROBE: POSITIVE
N GONORRHOEA DNA SPEC QL NAA+PROBE: NEGATIVE
SPECIMEN SOURCE: ABNORMAL
SPECIMEN SOURCE: NORMAL
T PALLIDUM AB SER QL: NONREACTIVE

## 2019-11-12 RX ORDER — AZITHROMYCIN 500 MG/1
TABLET, FILM COATED ORAL
Qty: 4 TABLET | Refills: 0 | Status: SHIPPED | OUTPATIENT
Start: 2019-11-12 | End: 2020-02-27

## 2019-11-12 NOTE — TELEPHONE ENCOUNTER
Per Dr Chávez via telephone conversation, OK to put in order for Azithromycin 500 mg, Take 2 tablets on the first day, then take 1 tablet on the 2nd day, then take the last tablet on day 3. Due to pt having positive rectal chlamydia and strep throat.  Amber Mackey RN

## 2019-12-31 DIAGNOSIS — F32.89 OTHER DEPRESSION: ICD-10-CM

## 2019-12-31 NOTE — TELEPHONE ENCOUNTER
Per [facility] Protocol, ok to refill medication. New prescription ordered.    Jacque Shankar RN

## 2020-02-15 ENCOUNTER — OFFICE VISIT (OUTPATIENT)
Dept: URGENT CARE | Facility: URGENT CARE | Age: 25
End: 2020-02-15
Payer: COMMERCIAL

## 2020-02-15 ENCOUNTER — HOSPITAL ENCOUNTER (EMERGENCY)
Facility: CLINIC | Age: 25
Discharge: HOME | End: 2020-02-15
Attending: EMERGENCY MEDICINE | Admitting: EMERGENCY MEDICINE
Payer: COMMERCIAL

## 2020-02-15 VITALS
OXYGEN SATURATION: 98 % | WEIGHT: 210 LBS | DIASTOLIC BLOOD PRESSURE: 63 MMHG | HEIGHT: 69 IN | BODY MASS INDEX: 31.1 KG/M2 | RESPIRATION RATE: 18 BRPM | TEMPERATURE: 100.5 F | SYSTOLIC BLOOD PRESSURE: 138 MMHG

## 2020-02-15 VITALS
BODY MASS INDEX: 31.1 KG/M2 | OXYGEN SATURATION: 97 % | WEIGHT: 210 LBS | SYSTOLIC BLOOD PRESSURE: 148 MMHG | DIASTOLIC BLOOD PRESSURE: 85 MMHG | HEART RATE: 104 BPM | TEMPERATURE: 102.5 F | HEIGHT: 69 IN

## 2020-02-15 DIAGNOSIS — J36 PERITONSILLAR ABSCESS: ICD-10-CM

## 2020-02-15 DIAGNOSIS — J36 PERITONSILLAR ABSCESS: Primary | ICD-10-CM

## 2020-02-15 LAB
DEPRECATED S PYO AG THROAT QL EIA: ABNORMAL
FLUAV+FLUBV AG SPEC QL: NEGATIVE
FLUAV+FLUBV AG SPEC QL: NEGATIVE
SPECIMEN SOURCE: ABNORMAL
SPECIMEN SOURCE: NORMAL

## 2020-02-15 PROCEDURE — 42700 I&D ABSCESS PERITONSILLAR: CPT | Performed by: EMERGENCY MEDICINE

## 2020-02-15 PROCEDURE — 87880 STREP A ASSAY W/OPTIC: CPT | Performed by: INTERNAL MEDICINE

## 2020-02-15 PROCEDURE — 99283 EMERGENCY DEPT VISIT LOW MDM: CPT | Mod: 25 | Performed by: EMERGENCY MEDICINE

## 2020-02-15 PROCEDURE — 99214 OFFICE O/P EST MOD 30 MIN: CPT | Performed by: PHYSICIAN ASSISTANT

## 2020-02-15 PROCEDURE — 87070 CULTURE OTHR SPECIMN AEROBIC: CPT | Performed by: STUDENT IN AN ORGANIZED HEALTH CARE EDUCATION/TRAINING PROGRAM

## 2020-02-15 PROCEDURE — 99284 EMERGENCY DEPT VISIT MOD MDM: CPT | Mod: 25 | Performed by: EMERGENCY MEDICINE

## 2020-02-15 PROCEDURE — 25000132 ZZH RX MED GY IP 250 OP 250 PS 637: Performed by: EMERGENCY MEDICINE

## 2020-02-15 PROCEDURE — 87077 CULTURE AEROBIC IDENTIFY: CPT | Performed by: STUDENT IN AN ORGANIZED HEALTH CARE EDUCATION/TRAINING PROGRAM

## 2020-02-15 PROCEDURE — 87076 CULTURE ANAEROBE IDENT EACH: CPT | Performed by: STUDENT IN AN ORGANIZED HEALTH CARE EDUCATION/TRAINING PROGRAM

## 2020-02-15 PROCEDURE — 87804 INFLUENZA ASSAY W/OPTIC: CPT | Performed by: INTERNAL MEDICINE

## 2020-02-15 PROCEDURE — 87075 CULTR BACTERIA EXCEPT BLOOD: CPT | Performed by: STUDENT IN AN ORGANIZED HEALTH CARE EDUCATION/TRAINING PROGRAM

## 2020-02-15 PROCEDURE — 42700 I&D ABSCESS PERITONSILLAR: CPT | Mod: Z6 | Performed by: EMERGENCY MEDICINE

## 2020-02-15 RX ORDER — OXYMETAZOLINE HYDROCHLORIDE 0.05 G/100ML
2 SPRAY NASAL 2 TIMES DAILY
COMMUNITY
End: 2020-04-02

## 2020-02-15 RX ORDER — ACETAMINOPHEN 500 MG
1000 TABLET ORAL ONCE
Status: COMPLETED | OUTPATIENT
Start: 2020-02-15 | End: 2020-02-15

## 2020-02-15 RX ORDER — IBUPROFEN 200 MG
200 TABLET ORAL EVERY 4 HOURS PRN
COMMUNITY
End: 2020-04-02

## 2020-02-15 RX ORDER — LIDOCAINE HYDROCHLORIDE AND EPINEPHRINE 10; 10 MG/ML; UG/ML
INJECTION, SOLUTION INFILTRATION; PERINEURAL
Status: DISCONTINUED
Start: 2020-02-15 | End: 2020-02-15 | Stop reason: HOSPADM

## 2020-02-15 RX ADMIN — ACETAMINOPHEN 1000 MG: 500 TABLET, FILM COATED ORAL at 13:31

## 2020-02-15 ASSESSMENT — ENCOUNTER SYMPTOMS
SHORTNESS OF BREATH: 0
VOICE CHANGE: 1
RHINORRHEA: 0
DIARRHEA: 1
FEVER: 1
MYALGIAS: 1
ABDOMINAL PAIN: 0
SORE THROAT: 1
DYSURIA: 0
VOMITING: 0
COUGH: 0

## 2020-02-15 ASSESSMENT — MIFFLIN-ST. JEOR
SCORE: 1922.93
SCORE: 1922.93

## 2020-02-15 NOTE — ED AVS SNAPSHOT
[facility], [place], Emergency Department  [address] [place] [id number]  Phone:  [phone]                                    Iker Berrios   MRN: [id number]    Department:  [facility], Emergency Department   Date of Visit:  2/15/2020           After Visit Summary Signature Page    I have received my discharge instructions, and my questions have been answered. I have discussed any challenges I see with this plan with the nurse or doctor.    ..........................................................................................................................................  Patient/Patient Representative Signature      ..........................................................................................................................................  Patient Representative Print Name and Relationship to Patient    ..................................................               ................................................  Date                                   Time    ..........................................................................................................................................  Reviewed by Signature/Title    ...................................................              ..............................................  Date                                               Time          22EPIC Rev 08/18

## 2020-02-15 NOTE — DISCHARGE INSTRUCTIONS
Please make an appointment to follow up with [facility] (phone: [phone]) in 10-14 days to recheck your throat.

## 2020-02-15 NOTE — ED PROVIDER NOTES
[place] EMERGENCY DEPARTMENT ([facility])  February 15, 2020    History     Chief Complaint   Patient presents with     Pharyngitis     HPI  Iker Berrios is a 24 year old male who presents to the ED for evaluation of a fever, sore throat, and body aches. Patient reports he developed a sore throat on Wednesday (2/12/20). He states he had some diarrhea last night. He reports he woke up this morning with a fever and body aches. He states his voice has started to change as well. Patient denies runny nose, congestion, or cough. He reports he has gone through 2 bottles of Advil since Wednesday. He denies contact with anyone with strep throat. He denies rash, vomiting, abdominal pain, chest pain, shortness of breath, or cough. Of note, patient states he went to a clinic earlier today and had negative flu and strep tests.  Review of the chart reveals that his strep test was positive.    PAST MEDICAL HISTORY  Past Medical History:   Diagnosis Date     NO ACTIVE PROBLEMS      PAST SURGICAL HISTORY  Past Surgical History:   Procedure Laterality Date     none       FAMILY HISTORY  Family History   Problem Relation Age of Onset     Diabetes Maternal Grandmother      Asthma No family hx of      C.A.D. No family hx of      Hypertension No family hx of      Cerebrovascular Disease No family hx of      Cancer No family hx of      SOCIAL HISTORY  Social History     Tobacco Use     Smoking status: Never Smoker     Smokeless tobacco: Never Used   Substance Use Topics     Alcohol use: No     MEDICATIONS  Current Facility-Administered Medications   Medication     lidocaine 1% with EPINEPHrine 1:100,000 1 %-1:[id number] injection     Current Outpatient Medications   Medication     amoxicillin-clavulanate (AUGMENTIN) 875-125 MG tablet     azithromycin (ZITHROMAX) 500 MG tablet     abacavir-dolutegravir-LamiVUDine (TRIUMEQ) 600- MG per tablet     Benzocaine-Menthol (SORE THROAT LOZENGES MT)     ibuprofen (ADVIL/MOTRIN)  "200 MG tablet     oxymetazoline (AFRIN) 0.05 % nasal spray     Pseudoeph-Doxylamine-DM-APAP (NYQUIL PO)     Pseudoephedrine-APAP-DM (DAYQUIL OR)     sertraline (ZOLOFT) 50 MG tablet     ALLERGIES  No Known Allergies    I have reviewed the Medications, Allergies, Past Medical and Surgical History, and Social History in the Epic system.    Review of Systems   Constitutional: Positive for fever.   HENT: Positive for sore throat and voice change. Negative for congestion and rhinorrhea.    Respiratory: Negative for cough and shortness of breath.    Cardiovascular: Negative for chest pain.   Gastrointestinal: Positive for diarrhea. Negative for abdominal pain and vomiting.   Genitourinary: Negative for dysuria.   Musculoskeletal: Positive for myalgias.   Skin: Negative for rash.   All other systems reviewed and are negative.      Physical Exam   BP: (!) 149/83  Heart Rate: 111  Temp: 100.5  F (38.1  C)  Resp: 18  Height: 175.3 cm (5' 9\")  Weight: 95.3 kg (210 lb)  SpO2: 98 %      Physical Exam     GEN:  Well developed, no acute distress  HEENT:  EOMI, Mucous membranes are moist.  There is posterior pharyngeal swelling, worse on the right side with uvular shift to the left.  Also posterior pharyngeal exudate.  Cardio:  RRR, no murmur, radial pulses equal bilaterally  PULM:  Lungs clear, good air movement, no wheezes, rales   Abd:  Soft, normal bowel sounds, no focal tenderness  Musculoskeletal:  normal range of motion, no lower extremity swelling or calf tenderness  Neuro:  Alert and oriented X3, Follows commands, moving all extremities spontaneously   Skin:  Warm, dry      ED Course        Procedures        Patient was given Tylenol in the emergency department for his fever and his pain.  ENT consult was obtained.  ENT did an incision and drainage of the peritonsillar abscess and was able to get 8 cc of purulent material out.  After this was done, patient had good pain relief, since he had been given local anesthetic.  " Patient was able to tolerate oral fluids quite well.    Labs Ordered and Resulted from Time of ED Arrival Up to the Time of Departure from the ED - No data to display         Assessments & Plan (with Medical Decision Making)   Patient presents with sore throat and fever, exam revealed peritonsillar abscess.  The peritonsillar abscess was drained by ENT in the emergency department.  ENT advised Augmentin for 10 days and follow-up in ENT clinic in 2 weeks.  Patient was given the prescription and was advised to take the full course of antibiotics.  He was advised to drink plenty of fluids, take ibuprofen and Tylenol as needed and follow-up with ENT.  Patient was given discharge instructions for drained peritonsillar abscess and was advised to return to the emergency department with worsening swelling of the throat, difficulty swallowing, any difficulty breathing or other concerns.    I have reviewed the nursing notes.    I have reviewed the findings, diagnosis, plan and need for follow up with the patient.    Discharge Medication List as of 2/15/2020  2:34 PM      START taking these medications    Details   amoxicillin-clavulanate (AUGMENTIN) 875-125 MG tablet Take 1 tablet by mouth 2 times daily for 10 days, Disp-20 tablet, R-0, Local Print             Final diagnoses:   Peritonsillar abscess     Amber CHAMBERLAIN, am serving as a trained medical scribe to document services personally performed by Brenda Hernandez MD, based on the provider's statements to me.      Brenda CHAMBERLAIN MD, was physically present and have reviewed and verified the accuracy of this note documented by Amber Romero.     2/15/2020   [facility], [place], EMERGENCY DEPARTMENT     Brenda Hernandez MD  02/15/20 [id number]

## 2020-02-15 NOTE — ED TRIAGE NOTES
Pt arrived to the ED with c/o a sore throat, cough, fever and right ear pain since Wednesday. Pt reports yesterday the throat swelling increased and pt noticed his voice change.

## 2020-02-15 NOTE — LETTER
February 15, 2020      To Whom It May Concern:      Iker Berrios was seen in our Emergency Department today, 02/15/20.  I expect his condition to improve over the next 2-3 days.  He may return to work/school when improved.    Sincerely,        Brenda Hernandez MD

## 2020-02-15 NOTE — CONSULTS
Otolaryngology Consult Note  February 15, 2020      CC: peritonsillar abscess    Consulting Provider: Brenda Hernandez MD    HPI: Iker Berrios is a 24 year old male with a past medical history of HIV who presented to the ED with odynophagia and fevers. He initially started having pain on Wednesday. He had some leftover azithrymycin so started taking this without a new prescription. His pain continued to worsen so came to the ED today. He endorses a muffled voice. Difficulty swallowing but no difficulty eating. Has never had a PTA before. Gets throat infections often, but isn't sure how many he's had in the past couple years. His HIV is controlled with medications.    Past Medical History:   Diagnosis Date     NO ACTIVE PROBLEMS        Past Surgical History:   Procedure Laterality Date     none         Current Outpatient Medications   Medication Sig Dispense Refill     azithromycin (ZITHROMAX) 500 MG tablet Please take 2 tablets (1000mg) today 11/12, then take 1 tablet (500 mg) on Wed 11/13, then take the last tablet (500 mg) on Thur 11/14. 4 tablet 0     abacavir-dolutegravir-LamiVUDine (TRIUMEQ) 600- MG per tablet Take 1 tablet by mouth daily 30 tablet 10     Benzocaine-Menthol (SORE THROAT LOZENGES MT)        ibuprofen (ADVIL/MOTRIN) 200 MG tablet Take 200 mg by mouth every 4 hours as needed for mild pain       oxymetazoline (AFRIN) 0.05 % nasal spray Spray 2 sprays into both nostrils 2 times daily       Pseudoeph-Doxylamine-DM-APAP (NYQUIL PO)        Pseudoephedrine-APAP-DM (DAYQUIL OR)        sertraline (ZOLOFT) 50 MG tablet Take 1 tablet (50 mg) by mouth daily 30 tablet 1        No Known Allergies    Social History     Socioeconomic History     Marital status: Single     Spouse name: Not on file     Number of children: Not on file     Years of education: Not on file     Highest education level: Not on file   Occupational History     Occupation: Student   Social Needs     Financial resource strain: Not  on file     Food insecurity:     Worry: Not on file     Inability: Not on file     Transportation needs:     Medical: Not on file     Non-medical: Not on file   Tobacco Use     Smoking status: Never Smoker     Smokeless tobacco: Never Used   Substance and Sexual Activity     Alcohol use: No     Drug use: No     Sexual activity: Yes     Partners: Male     Birth control/protection: Condom     Comment: Use of condoms 50% of the time    Lifestyle     Physical activity:     Days per week: Not on file     Minutes per session: Not on file     Stress: Not on file   Relationships     Social connections:     Talks on phone: Not on file     Gets together: Not on file     Attends Gnosticist service: Not on file     Active member of club or organization: Not on file     Attends meetings of clubs or organizations: Not on file     Relationship status: Not on file     Intimate partner violence:     Fear of current or ex partner: Not on file     Emotionally abused: Not on file     Physically abused: Not on file     Forced sexual activity: Not on file   Other Topics Concern     Parent/sibling w/ CABG, MI or angioplasty before 65F 55M? Not Asked   Social History Narrative    19:  Denies smoking, alcohol, illicit drugs.  Sexually active with men only, both insertive and receptive and inconsistent condom use. [facility] [employer] in [facility].   service [phone] (worked on an [employer]). Deployed to [place] for three years.  Brother recently .             Family History   Problem Relation Age of Onset     Diabetes Maternal Grandmother      Asthma No family hx of      C.A.D. No family hx of      Hypertension No family hx of      Cerebrovascular Disease No family hx of      Cancer No family hx of        ROS: 12 point review of systems is negative unless noted in HPI.    PHYSICAL EXAM:  General: laying in bed, no acute distress  BP (!) 149/83   Temp 100.5  F (38.1  C) (Oral)   Resp 18   Ht 1.753 m  "(5' 9\")   Wt 95.3 kg (210 lb)   SpO2 98%   BMI 31.01 kg/m    HEAD: normocephalic, atraumatic  Face: symmetrical, CN VII intact bilaterally (HB 1), no swelling, edema, or erythema. Sensation V1-V3 intact and equal bilaterally.   Eyes: EOMI without spontaneous or gaze evoked nystagmus, PERRL, clear sclera  Ears: no tragal tenderness, external ear canal open and clear bilaterally  Nose: no anterior drainage, intact and midline septum without perforation or hematoma   Mouth: moist, no ulcers, no jaw or tooth tenderness, tongue midline and symmetric  Oropharynx: large erythematous palatine tonsils. Uvula slightly deviated to the left. Bulging of left palatine tonsil.  Neck: no LAD, trachea midline  Neuro: cranial nerves 2-12 grossly intact  Resp: non labored breathing on room air    PROCEDURE  I&D of PTA  Verbal consent obtained.  3cc of 1% lidocaine with 1:100,000 epinephrine injected.  8cc of purulent fluid drained with 18 gauge needle/syringe.    Assessment and Plan  Iker Berrios is a 24 year old male with a past medical history of HIV who presented with a peritonsillar abscess.  -- Abscess drained without issue at bedside  -- Augmentin x 10 days  -- Follow up in ENT clinic in 2-3 weeks  -- Please contact ENT with questions/concerns    Ximena Patel MD  Otolaryngology-Head & Neck Surgery PGY4  Please page ENT with questions by dialing [phone] and entering job code [phone] when prompted.    "

## 2020-02-15 NOTE — PROGRESS NOTES
"SUBJECTIVE:  Iker Berrios is a 24 year old male with a history of HIV, who presents the clinic for evaluation of sore throat.  Symptoms started on 2/12/2020.  He first noticed sore throat, and it has progressively worsened.  He reports that pain has now spread from the right side of his throat to his right jaw.  Today he developed body aches and fever.     Past Medical History:   Diagnosis Date     NO ACTIVE PROBLEMS      Current Outpatient Medications   Medication Sig Dispense Refill     abacavir-dolutegravir-LamiVUDine (TRIUMEQ) 600- MG per tablet Take 1 tablet by mouth daily 30 tablet 10     azithromycin (ZITHROMAX) 500 MG tablet Please take 2 tablets (1000mg) today 11/12, then take 1 tablet (500 mg) on Wed 11/13, then take the last tablet (500 mg) on Thur 11/14. 4 tablet 0     Benzocaine-Menthol (SORE THROAT LOZENGES MT)        ibuprofen (ADVIL/MOTRIN) 200 MG tablet Take 200 mg by mouth every 4 hours as needed for mild pain       oxymetazoline (AFRIN) 0.05 % nasal spray Spray 2 sprays into both nostrils 2 times daily       Pseudoeph-Doxylamine-DM-APAP (NYQUIL PO)        Pseudoephedrine-APAP-DM (DAYQUIL OR)        sertraline (ZOLOFT) 50 MG tablet Take 1 tablet (50 mg) by mouth daily 30 tablet 1     Social History     Tobacco Use     Smoking status: Never Smoker     Smokeless tobacco: Never Used   Substance Use Topics     Alcohol use: No       ROS:  Review of systems negative except as stated above.    OBJECTIVE:   BP (!) 148/85   Pulse 104   Temp 102.5  F (39.2  C) (Oral)   Ht 1.753 m (5' 9\")   Wt 95.3 kg (210 lb)   SpO2 97%   BMI 31.01 kg/m    GENERAL APPEARANCE: alert, no distress  EYES: EOMI,  PERRL, conjunctiva clear  HENT:  Pharynx erythematous. Peritonsillar abscess noted on Right side. Trismus is present. Managing secretions.   NECK: supple, non-tender to palpation, no adenopathy noted  RESP: lungs clear to auscultation - no rales, rhonchi or wheezes  CV: regular rates and rhythm, normal S1 " S2, no murmur noted  SKIN: no suspicious lesions or rashes      ASSESSMENT / PLAN:  1. Peritonsillar abscess  Patient with a sore throat for the past 3 days, worsening today.  He is managing his secretions, although does have trismus.  Peritonsillar abscess is noted on the right side with uvular deviation.  Advised ER for further treatment.  Patient will go to [place] now, ER contacted.  - Rapid strep screen  - Influenza A/B antigen    Odalys Thrasher PA-C

## 2020-02-17 LAB
BACTERIA SPEC CULT: ABNORMAL
BACTERIA SPEC CULT: ABNORMAL
SPECIMEN SOURCE: ABNORMAL

## 2020-02-17 NOTE — RESULT ENCOUNTER NOTE
Final Abscess culture (peritonsillar abscess) report on 2/17  Emergency Dept discharge antibiotic prescribed: Amoxicillin-Clavulanate (Augmentin) 875-125 mg PO tablet, 1 tablet by mouth 2 times daily for 10 days  #1. Bacteria, Heavy growth Streptococcus pyrogenes sero group A, which is [SUSCEPTIBLE] to ED discharge antibiotic.  Incision and Drainage performed in the [place] ED: Yes  Patient to be notified of result, symptoms assessed and advised per [place] ED lab result protocol.

## 2020-02-17 NOTE — TELEPHONE ENCOUNTER
FUTURE VISIT INFORMATION      FUTURE VISIT INFORMATION:    Date: 2/27/20    Time: 3 PM    Location: [facility]  REFERRAL INFORMATION:    Referring provider:  Dr. Green    Referring providers clinic:  [facility]    Reason for visit/diagnosis: Peritonsillar Abscess    RECORDS REQUESTED FROM:       Clinic name Comments Records Status Imaging Status   [facility] 2/15/20 - ED OV with Dr. Mary Casarez  Yanci Edwards 2/15/20 - UC OV with GUANAKO Braden  3/24/19 -  OV with Dr. Kiko Ramirez [facility] Adenike Edwards 11/1/17 - OV with Dr. Segundo Lofton

## 2020-02-22 ENCOUNTER — TELEPHONE (OUTPATIENT)
Dept: EMERGENCY MEDICINE | Facility: CLINIC | Age: 25
End: 2020-02-22

## 2020-02-22 LAB
BACTERIA SPEC CULT: ABNORMAL
Lab: ABNORMAL
SPECIMEN SOURCE: ABNORMAL

## 2020-02-22 NOTE — TELEPHONE ENCOUNTER
[facility] Lab result notification:    [place] ED lab result protocol used  General culture    Reason for call  Notify of lab results, assess symptoms,  review ED providers recommendations/discharge instructions (if necessary) and advise per ED lab result f/u protocol    Lab Result   Emergency Dept discharge antibiotic prescribed: Amoxicillin-Clavulanate (Augmentin) 875-125 mg PO tablet, 1 tablet by mouth 2 times daily for 10 days  Final Anaerobic bacterial culture on 2/22/2020 shows the presence of bacteria(s):  Heavy growth Fusobacterium necrophorum, Susceptibility testing not routinely done   Patient to be notified of result, symptoms's assessed and advised per [place] ED lab result protocol.    Final Abscess culture (peritonsillar abscess) report on 2/17  Emergency Dept discharge antibiotic prescribed: Amoxicillin-Clavulanate (Augmentin) 875-125 mg PO tablet, 1 tablet by mouth 2 times daily for 10 days  #1. Bacteria, Heavy growth Streptococcus pyrogenes sero group A, which is [SUSCEPTIBLE] to ED discharge antibiotic.  Incision and Drainage performed in the [place] ED: Yes  Patient to be notified of result, symptoms assessed and advised per [place] ED lab result protocol.    Information table from ED Provider visit on 2/15/2020  Symptoms reported at ED visit (Chief complaint, HPI) Pharyngitis      HPI  Iker Berrios is a 24 year old male who presents to the ED for evaluation of a fever, sore throat, and body aches. Patient reports he developed a sore throat on Wednesday (2/12/20). He states he had some diarrhea last night. He reports he woke up this morning with a fever and body aches. He states his voice has started to change as well. Patient denies runny nose, congestion, or cough. He reports he has gone through 2 bottles of Advil since Wednesday. He denies contact with anyone with strep throat. He denies rash, vomiting, abdominal pain, chest pain, shortness of breath, or  cough. Of note, patient states he went to a clinic earlier today and had negative flu and strep tests.  Review of the chart reveals that his strep test was positive.     ED providers Impression and Plan (applicable information) Patient presents with sore throat and fever, exam revealed peritonsillar abscess.  The peritonsillar abscess was drained by ENT in the emergency department.  ENT advised Augmentin for 10 days and follow-up in ENT clinic in 2 weeks.  Patient was given the prescription and was advised to take the full course of antibiotics.  He was advised to drink plenty of fluids, take ibuprofen and Tylenol as needed and follow-up with ENT.  Patient was given discharge instructions for drained peritonsillar abscess and was advised to return to the emergency department with worsening swelling of the throat, difficulty swallowing, any difficulty breathing or other concerns.   Miscellaneous information na     RN Assessment (Patient s current Symptoms), include time called.  [Insert Left message here if message left]  5:54PM; Attempted to reach patient. Phone line busy x 2 attempts.       [RN Name]  Latisha Drake RN  [employer] RN  Lung Nodule and ED Lab Result RN  Epic pool (ED late result f/u RN): P [id number]  [facility] INCIDENTAL RADIOLOGY F/U NURSES: P [id number]  Ph# [phone]      Copy of Lab result   Anaerobic bacterial culture [[id number]] (Order [id number])   Order Requisition     Anaerobic bacterial culture (Order #[id number]) on 2/15/20   Exam Information     Exam Date Exam Time Accession # Results    2/15/20  2:10 PM [id number]    Specimen Information: Throat        Component Collected Lab   Specimen Description 02/15/2020  2:10    Throat    Special Requests 02/15/2020  2:10    Specimen collected in eSwab transport (white cap)    Culture Micro Abnormal  02/15/2020  2:10    Heavy growth   Fusobacterium necrophorum   Susceptibility testing not routinely done     Abscess Culture  Aerobic Bacterial [[id number]] (Order [id number])   Order Requisition     Abscess Culture Aerobic Bacterial (Order #[id number]) on 2/15/20   Exam Information     Exam Date Exam Time Accession # Results    2/15/20  2:10 PM [id number]    Specimen Information: Throat        Component Collected Lab   Specimen Description 02/15/2020  2:10    Throat    Culture Micro Abnormal  02/15/2020  2:10    Heavy growth   Streptococcus pyogenes sero group A   Susceptibility testing not routinely done    Culture Micro 02/15/2020  2:10    Plus   Light growth   Normal yuval

## 2020-02-23 NOTE — TELEPHONE ENCOUNTER
"[facility] Emergency Department Lab result notification     Patient/parent Name  Dong    RN Assessment (Patient s current Symptoms), include time called.  [Insert Left message here if message left]  2:42PM: Patient returned call. States he is feeling better \"I'm back to normal\". Is taking the antibiotic as directed.   Lab result (if applicable):  Emergency Dept discharge antibiotic prescribed: Amoxicillin-Clavulanate (Augmentin) 875-125 mg PO tablet, 1 tablet by mouth 2 times daily for 10 days  Final Anaerobic bacterial culture on 2/22/2020 shows the presence of bacteria(s):  Heavy growth Fusobacterium necrophorum, Susceptibility testing not routinely done   Patient to be notified of result, symptoms's assessed and advised per [place] ED lab result protocol.     Final Abscess culture (peritonsillar abscess) report on 2/17  Emergency Dept discharge antibiotic prescribed: Amoxicillin-Clavulanate (Augmentin) 875-125 mg PO tablet, 1 tablet by mouth 2 times daily for 10 days  #1. Bacteria, Heavy growth Streptococcus pyrogenes sero group A, which is [SUSCEPTIBLE] to ED discharge antibiotic.  Incision and Drainage performed in the [place] ED: Yes  Patient to be notified of result, symptoms assessed and advised per [place] ED lab result protocol.  RN Recommendations/Instructions per [place] ED lab result protocol  Patient notified of lab result and treatment recommendation.   Advised to continue taking the antibiotic as directed and to follow up with ENT as directed by the ED provider. The patient states that he has an ENT appointment scheduled for this week.  The patient is comfortable with the information given and has no further questions.      Please Contact your PCP clinic or return to the Emergency department if your:    Symptoms return.    Symptoms worsen or other concerning symptom's.    PCP follow-up Questions asked: YES       [RN Name]  Latisha Drake RN  [employer] RN  Lung Nodule and " ED Lab Result RN  Epic pool (ED late result f/u RN): P [id number]  [facility] INCIDENTAL RADIOLOGY F/U NURSES: P [id number]  # [phone]

## 2020-02-24 ENCOUNTER — HEALTH MAINTENANCE LETTER (OUTPATIENT)
Age: 25
End: 2020-02-24

## 2020-02-27 ENCOUNTER — OFFICE VISIT (OUTPATIENT)
Dept: OTOLARYNGOLOGY | Facility: CLINIC | Age: 25
End: 2020-02-27
Payer: COMMERCIAL

## 2020-02-27 ENCOUNTER — PRE VISIT (OUTPATIENT)
Dept: OTOLARYNGOLOGY | Facility: CLINIC | Age: 25
End: 2020-02-27

## 2020-02-27 DIAGNOSIS — Z98.890 H/O PERITONSILLAR ABSCESS DRAINAGE: Primary | ICD-10-CM

## 2020-02-27 ASSESSMENT — PAIN SCALES - GENERAL: PAINLEVEL: NO PAIN (0)

## 2020-02-27 NOTE — NURSING NOTE
Chief Complaint   Patient presents with     Hospital F/U     Pt is here to follow up from ED.      Marybel Antonio LPN at 2:53 PM on 2/27/2020.

## 2020-02-27 NOTE — PROGRESS NOTES
[facility] Follow Up Visit Note  Otolaryngology    February 27, 2020       HPI:  Iker Berrios is a 24 year old male who presents for a follow-up visit after he had a right-sided peritonsillar abscess drained on 2/15/2020.  He was seen in the emergency department at [facility].    Patient reports that since he had the abscess drained, he has completed all of the antibiotics.  His last dose was on Monday, 2/24/2020.  He states that all of the pain is gone.  There is no swelling, no trouble swallowing, no change in his voice.  No neck pain or masses.  No fevers.  No hemoptysis or bleeding.  He feels like he is back to baseline, he is eating and drinking well and his energy is returned to normal.  He has no new concerns today.    He does tell me that he is interested in getting his tonsils out at some point.  He states that he will get strep throat or pharyngitis 4-5 times a year and this is been happening for the last 3 to 4 years.  He is wondering what it would entail to get his tonsils out.    REVIEW OF SYSTEMS:  10 point ROS was negative other than the symptoms noted above in the HPI.    Physical Exam:  There were no vitals taken for this visit.    Constitutional: The patient was unaccompanied, well-groomed, and in no acute distress.    Head: Normocephalic and atraumatic.    Ears: Pinnae and tragus non-tender.  EACs and TMs were clear under microscopic exam.   Nose: Sinuses were non-tender.  Anterior rhinoscopy revealed midline septum and absence of purulence or polyps.    Mouth: Mucosa pink and moist, tonsils non-erythematous, 3+ bilaterally and cryptic, no fluctuance or evidence of recurrent right peritonsillar abscess, no exudates, uvula midline  Neck: No lymphadenopathy in the neck.  No palpable thyroid.  Normal range of motion  Respiratory: Breathing comfortably without stridor or exertion of accessory muscles.   Skin: Normal:  warm and pink without rash  Neurologic: Alert and oriented x  3.  CN's III-XII within normal limits.  Voice normal.   Psychiatric: The patient's affect was calm, cooperative, and appropriate.        Assessment/Plan:   1. H/O peritonsillar abscess drainage  Patient doing well.  Completed his antibiotics earlier this week.  No evidence of recurrent peritonsillar abscess on exam.    Had a discussion with the patient today about the increased likelihood of second peritonsillar abscess having had one.  Also discussed tonsillectomy given the large tonsils bilaterally and his history of getting pharyngitis 4-5 times a year for the last 3 or 4 years.  Offered him an appointment with 1 of our tonsil surgeons but he wanted to hold off at this time.  He was given the clinic number and told to call when he felt it was a good time to schedule that appointment to discuss tonsillectomy.  He may do it later this summer after he is finished with his school course and has graduated.    He is to return to the clinic if he has evidence of recurrence of pain, swelling, trouble swallowing or speaking.  If symptoms occur after clinic hours, he is to go to the emergency department.  Otherwise call with questions or concerns.        Sabrina Polk PA-C  Otolaryngology  Head & Neck Surgery  [phone]      CC:  Margareth Morales M.D.  Otolaryngology- Head & Neck Surgery  [place] [id number]

## 2020-02-27 NOTE — PATIENT INSTRUCTIONS
Iker Berrios,    It was a pleasure to see you today.    1. You were seen in the ENT Clinic today by Sabrina Polk PA-C.  If you have any questions or concerns after your appointment, please call   - Option 1: ENT Clinic: [phone]      2. Call the above number if you decide you want to meet with one of our surgeons to discuss a tonsillectomy.    3. Also call the above number if you get recurrent symptoms.  If it is after the clinic is closed or a weekend, go to the Emergency Department      Thank you,  Sabrina Polk PA-C  Otolaryngology  Head & Neck Surgery  [phone]

## 2020-02-27 NOTE — LETTER
2/27/2020       RE: Iker Berrios  [address]  [place] [address]     Dear Colleague,    Thank you for referring your patient, Iker Berrios, to the [facility] EAR NOSE AND THROAT at [facility]. Please see a copy of my visit note below.    [facility]ose [facility] Clinic Follow Up Visit Note  Otolaryngology    February 27, 2020       HPI:  Iker Berrios is a 24 year old male who presents for a follow-up visit after he had a right-sided peritonsillar abscess drained on 2/15/2020.  He was seen in the emergency department at [facility].    Patient reports that since he had the abscess drained, he has completed all of the antibiotics.  His last dose was on Monday, 2/24/2020.  He states that all of the pain is gone.  There is no swelling, no trouble swallowing, no change in his voice.  No neck pain or masses.  No fevers.  No hemoptysis or bleeding.  He feels like he is back to baseline, he is eating and drinking well and his energy is returned to normal.  He has no new concerns today.    He does tell me that he is interested in getting his tonsils out at some point.  He states that he will get strep throat or pharyngitis 4-5 times a year and this is been happening for the last 3 to 4 years.  He is wondering what it would entail to get his tonsils out.    REVIEW OF SYSTEMS:  10 point ROS was negative other than the symptoms noted above in the HPI.    Physical Exam:  There were no vitals taken for this visit.    Constitutional: The patient was unaccompanied, well-groomed, and in no acute distress.    Head: Normocephalic and atraumatic.    Ears: Pinnae and tragus non-tender.  EACs and TMs were clear under microscopic exam.   Nose: Sinuses were non-tender.  Anterior rhinoscopy revealed midline septum and absence of purulence or polyps.    Mouth: Mucosa pink and moist, tonsils non-erythematous, 3+ bilaterally and cryptic, no fluctuance or evidence of recurrent right  peritonsillar abscess, no exudates, uvula midline  Neck: No lymphadenopathy in the neck.  No palpable thyroid.  Normal range of motion  Respiratory: Breathing comfortably without stridor or exertion of accessory muscles.   Skin: Normal:  warm and pink without rash  Neurologic: Alert and oriented x 3.  CN's III-XII within normal limits.  Voice normal.   Psychiatric: The patient's affect was calm, cooperative, and appropriate.        Assessment/Plan:   1. H/O peritonsillar abscess drainage  Patient doing well.  Completed his antibiotics earlier this week.  No evidence of recurrent peritonsillar abscess on exam.    Had a discussion with the patient today about the increased likelihood of second peritonsillar abscess having had one.  Also discussed tonsillectomy given the large tonsils bilaterally and his history of getting pharyngitis 4-5 times a year for the last 3 or 4 years.  Offered him an appointment with 1 of our tonsil surgeons but he wanted to hold off at this time.  He was given the clinic number and told to call when he felt it was a good time to schedule that appointment to discuss tonsillectomy.  He may do it later this summer after he is finished with his school course and has graduated.    He is to return to the clinic if he has evidence of recurrence of pain, swelling, trouble swallowing or speaking.  If symptoms occur after clinic hours, he is to go to the emergency department.  Otherwise call with questions or concerns.        Sabrina Pokl PA-C  Otolaryngology  Head & Neck Surgery  [phone]      CC:  Margareth Morales M.D.  Otolaryngology- Head & Neck Surgery  [place] [phone]

## 2020-02-28 DIAGNOSIS — Z21 HUMAN IMMUNODEFICIENCY VIRUS I INFECTION (H): ICD-10-CM

## 2020-02-28 RX ORDER — ABACAVIR SULFATE, DOLUTEGRAVIR SODIUM, LAMIVUDINE 600; 50; 300 MG/1; MG/1; MG/1
TABLET, FILM COATED ORAL
Qty: 90 TABLET | Refills: 1 | Status: SHIPPED | OUTPATIENT
Start: 2020-02-28 | End: 2020-08-24

## 2020-04-02 ENCOUNTER — ALLIED HEALTH/NURSE VISIT (OUTPATIENT)
Dept: PHARMACY | Facility: CLINIC | Age: 25
End: 2020-04-02
Payer: COMMERCIAL

## 2020-04-02 DIAGNOSIS — Z86.59 HISTORY OF DEPRESSION: ICD-10-CM

## 2020-04-02 DIAGNOSIS — Z21 HUMAN IMMUNODEFICIENCY VIRUS I INFECTION (H): Primary | ICD-10-CM

## 2020-04-02 PROCEDURE — 99605 MTMS BY PHARM NP 15 MIN: CPT | Performed by: PHARMACIST

## 2020-04-02 NOTE — PROGRESS NOTES
MTM ENCOUNTER  SUBJECTIVE/OBJECTIVE:                           Iker Berrios is a 24 year old male called for an initial visit.      Patient consented to a telehealth visit: yes    Chief Complaint: Reports he is concerned if he gets the coronavirus that he would be at greater risk and is wondering if he is at greater risk.  Reports in January or February he had a lot of symptoms similar to what they are describing for covid- 19.  Reports he went to the emergency room, could not breath, had a fever of 103 which went down to 101.  Reports he tested negative for the flu and for strep at that time.  He was wondering if this could have been covid-19 virus.  Personal Healthcare Goals: Stay healthy, self distance and stay safe.  Continue taking medication every day to stay undetectable    Allergies/ADRs: Reviewed in Epic  Tobacco:  reports that he has never smoked. He has never used smokeless tobacco.  Alcohol: 3-4 beverages / week  Caffeine: no caffeine  Activity: Reports he is walking, and went to the gym about 4 times per week.  Reports since June is closed he is working out at home.  PMH: Reviewed in Epic    Medication Adherence/Access: no issues reported  Patient takes medications directly from bottles.  Patient takes medications 1 time(s) per day.   Per patient, misses medication 0 times per week.   Medication barriers: none.   The patient fills medications at [facility]: YES.    HIV: On August 16, 2019, CD4 was 888, and CD4 percent was 39.  Viral load was not detected.  Patient reports takes Triumeq 1 tablet daily every day.  Reports does not miss any doses.  Reports tolerating medication well.  Reports he is concerned due to his HIV status that he is more likely for complications if he gets covid-19 and is wondering about that.    Depression: Reports he is slowly weaning himself off of sertraline and has not gotten any additional refills on this medication.  Reports he started taking this medication early on when  he was first diagnosed with HIV.  Reports his mood has improved and he is just doing fine.  Reports his mood is stable.  Today's Vitals:   There were no vitals taken for this visit.      ASSESSMENT:                              Medication Adherence: excellent, no issues identified.    Discussed with him that he may or may not have had Kovic 19 back in January or February.  Discussed we may have an antibody test that we may be able to test him in the future to see if he did in fact have the virus.    HIV: Stable.  Patient has excellent CD4 and his viral load is undetectable.  Discussed due to his excellent medication adherence and strong labs, he is not at any greater risk of developing complications from the coronavirus than anyone else.  He is encouraged to follow CDC guidelines for staying safe, social distancing, handwashing, not touching face, shelter in place, etc.    Depression: Stable.  Per patient account he is weaning himself off slowly of sertraline.  We discussed dosing schedule, side effects of fast tapering.  Patient will follow up with MD if he is having any difficulty with this.  PHQ-9 deferred to follow-up due to time.      PLAN:                            1) continue medication as directed.    I spent 30 minutes with this patient today. I offer these suggestions for consideration by ID. A copy of the visit note was provided to the patient's ID provider.    Will follow up in one year or before, per patient.    The patient declined a summary of these recommendations.     Maribel Shoemaker, [facility] Pharmacist.   [phone]

## 2020-07-13 ENCOUNTER — TELEPHONE (OUTPATIENT)
Dept: INFECTIOUS DISEASES | Facility: CLINIC | Age: 25
End: 2020-07-13

## 2020-07-13 NOTE — TELEPHONE ENCOUNTER
[facility] Call Center    Phone Message    May a detailed message be left on voicemail: yes     Reason for Call: Other: Per my chart request, Iker would like I m looking to get a full sti check. He would like a phone visit.  [facility] Nurse does not have anything available.. Per protocols, this can be scheduled as a phone visit with [facility] nurse. Please review and call patient to schedule.     Action Taken: Other: Infectious Disease    Travel Screening: Not Applicable

## 2020-07-17 ENCOUNTER — ALLIED HEALTH/NURSE VISIT (OUTPATIENT)
Dept: INFECTIOUS DISEASES | Facility: CLINIC | Age: 25
End: 2020-07-17
Payer: COMMERCIAL

## 2020-07-17 DIAGNOSIS — Z11.3 SCREENING FOR STDS (SEXUALLY TRANSMITTED DISEASES): Primary | ICD-10-CM

## 2020-07-17 DIAGNOSIS — Z11.3 SCREENING FOR STDS (SEXUALLY TRANSMITTED DISEASES): ICD-10-CM

## 2020-07-17 DIAGNOSIS — Z21 ASYMPTOMATIC HUMAN IMMUNODEFICIENCY VIRUS (HIV) INFECTION STATUS (H): ICD-10-CM

## 2020-07-17 DIAGNOSIS — Z11.3 SCREENING FOR STD (SEXUALLY TRANSMITTED DISEASE): Primary | ICD-10-CM

## 2020-07-17 LAB
ALBUMIN SERPL-MCNC: 4.2 G/DL (ref 3.4–5)
ALP SERPL-CCNC: 65 U/L (ref 40–150)
ALT SERPL W P-5'-P-CCNC: 32 U/L (ref 0–70)
ANION GAP SERPL CALCULATED.3IONS-SCNC: 7 MMOL/L (ref 3–14)
AST SERPL W P-5'-P-CCNC: 33 U/L (ref 0–45)
BASOPHILS # BLD AUTO: 0 10E9/L (ref 0–0.2)
BASOPHILS NFR BLD AUTO: 0.3 %
BILIRUB SERPL-MCNC: 0.4 MG/DL (ref 0.2–1.3)
BUN SERPL-MCNC: 18 MG/DL (ref 7–30)
CALCIUM SERPL-MCNC: 9.3 MG/DL (ref 8.5–10.1)
CHLORIDE SERPL-SCNC: 107 MMOL/L (ref 94–109)
CO2 SERPL-SCNC: 24 MMOL/L (ref 20–32)
CREAT SERPL-MCNC: 1.51 MG/DL (ref 0.66–1.25)
DIFFERENTIAL METHOD BLD: ABNORMAL
EOSINOPHIL # BLD AUTO: 0.1 10E9/L (ref 0–0.7)
EOSINOPHIL NFR BLD AUTO: 1.5 %
ERYTHROCYTE [DISTWIDTH] IN BLOOD BY AUTOMATED COUNT: 14.8 % (ref 10–15)
GFR SERPL CREATININE-BSD FRML MDRD: 63 ML/MIN/{1.73_M2}
GLUCOSE SERPL-MCNC: 78 MG/DL (ref 70–99)
HCT VFR BLD AUTO: 47.9 % (ref 40–53)
HGB BLD-MCNC: 14.6 G/DL (ref 13.3–17.7)
IMM GRANULOCYTES # BLD: 0 10E9/L (ref 0–0.4)
IMM GRANULOCYTES NFR BLD: 0.3 %
LYMPHOCYTES # BLD AUTO: 2.3 10E9/L (ref 0.8–5.3)
LYMPHOCYTES NFR BLD AUTO: 37.3 %
MCH RBC QN AUTO: 23.4 PG (ref 26.5–33)
MCHC RBC AUTO-ENTMCNC: 30.5 G/DL (ref 31.5–36.5)
MCV RBC AUTO: 77 FL (ref 78–100)
MONOCYTES # BLD AUTO: 0.7 10E9/L (ref 0–1.3)
MONOCYTES NFR BLD AUTO: 12.1 %
NEUTROPHILS # BLD AUTO: 3 10E9/L (ref 1.6–8.3)
NEUTROPHILS NFR BLD AUTO: 48.5 %
NRBC # BLD AUTO: 0 10*3/UL
NRBC BLD AUTO-RTO: 0 /100
PLATELET # BLD AUTO: 242 10E9/L (ref 150–450)
POTASSIUM SERPL-SCNC: 3.8 MMOL/L (ref 3.4–5.3)
PROT SERPL-MCNC: 7.9 G/DL (ref 6.8–8.8)
RBC # BLD AUTO: 6.24 10E12/L (ref 4.4–5.9)
SODIUM SERPL-SCNC: 139 MMOL/L (ref 133–144)
WBC # BLD AUTO: 6.1 10E9/L (ref 4–11)

## 2020-07-17 PROCEDURE — 87491 CHLMYD TRACH DNA AMP PROBE: CPT | Performed by: INTERNAL MEDICINE

## 2020-07-17 PROCEDURE — 36415 COLL VENOUS BLD VENIPUNCTURE: CPT | Performed by: INTERNAL MEDICINE

## 2020-07-17 PROCEDURE — 86360 T CELL ABSOLUTE COUNT/RATIO: CPT | Performed by: INTERNAL MEDICINE

## 2020-07-17 PROCEDURE — 87536 HIV-1 QUANT&REVRSE TRNSCRPJ: CPT | Performed by: INTERNAL MEDICINE

## 2020-07-17 PROCEDURE — 86780 TREPONEMA PALLIDUM: CPT | Performed by: INTERNAL MEDICINE

## 2020-07-17 PROCEDURE — 80053 COMPREHEN METABOLIC PANEL: CPT | Performed by: INTERNAL MEDICINE

## 2020-07-17 PROCEDURE — 87591 N.GONORRHOEAE DNA AMP PROB: CPT | Performed by: INTERNAL MEDICINE

## 2020-07-17 PROCEDURE — 85025 COMPLETE CBC W/AUTO DIFF WBC: CPT | Performed by: INTERNAL MEDICINE

## 2020-07-17 PROCEDURE — 86359 T CELLS TOTAL COUNT: CPT | Performed by: INTERNAL MEDICINE

## 2020-07-17 NOTE — PROGRESS NOTES
Per [facility] Ambulatory Care Protocol, Pt is symptomatic for a sexually transmitted infection or pt has known personal history of exposure to potentially hazardous body fluids, therefor lab orders entered today for STD testing.     Jacque Shankar RN

## 2020-07-18 LAB
CD3 CELLS # BLD: 1684 CELLS/UL (ref 603–2990)
CD3 CELLS NFR BLD: 67 % (ref 49–84)
CD3+CD4+ CELLS # BLD: 876 CELLS/UL (ref 441–2156)
CD3+CD4+ CELLS NFR BLD: 35 % (ref 28–63)
CD3+CD4+ CELLS/CD3+CD8+ CLL BLD: 1.25 % (ref 1.4–2.6)
CD3+CD8+ CELLS # BLD: 712 CELLS/UL (ref 125–1312)
CD3+CD8+ CELLS NFR BLD: 28 % (ref 10–40)
IFC SPECIMEN: ABNORMAL
T PALLIDUM AB SER QL: NONREACTIVE

## 2020-07-19 LAB
C TRACH DNA SPEC QL NAA+PROBE: NEGATIVE
N GONORRHOEA DNA SPEC QL NAA+PROBE: NEGATIVE
SPECIMEN SOURCE: NORMAL

## 2020-08-24 DIAGNOSIS — Z21 HUMAN IMMUNODEFICIENCY VIRUS I INFECTION (H): ICD-10-CM

## 2020-08-24 RX ORDER — ABACAVIR SULFATE, DOLUTEGRAVIR SODIUM, LAMIVUDINE 600; 50; 300 MG/1; MG/1; MG/1
1 TABLET, FILM COATED ORAL DAILY
Qty: 30 TABLET | Refills: 1 | Status: SHIPPED | OUTPATIENT
Start: 2020-08-24 | End: 2020-10-30

## 2020-09-17 ENCOUNTER — TELEPHONE (OUTPATIENT)
Dept: PHARMACY | Facility: CLINIC | Age: 25
End: 2020-09-17

## 2020-09-17 NOTE — TELEPHONE ENCOUNTER
Left message for patient to call me/clinic.  Would like to check on medication adherence.  Looks like patient is due for follow-up.    Maribel Shoemaker, [facility] Pharmacist.   [phone]

## 2020-10-30 DIAGNOSIS — Z21 HUMAN IMMUNODEFICIENCY VIRUS I INFECTION (H): ICD-10-CM

## 2020-10-30 RX ORDER — ABACAVIR SULFATE, DOLUTEGRAVIR SODIUM, LAMIVUDINE 600; 50; 300 MG/1; MG/1; MG/1
1 TABLET, FILM COATED ORAL DAILY
Qty: 30 TABLET | Refills: 0 | Status: SHIPPED | OUTPATIENT
Start: 2020-10-30 | End: 2020-11-18

## 2020-11-18 ENCOUNTER — VIRTUAL VISIT (OUTPATIENT)
Dept: INFECTIOUS DISEASES | Facility: CLINIC | Age: 25
End: 2020-11-18
Attending: INTERNAL MEDICINE
Payer: COMMERCIAL

## 2020-11-18 DIAGNOSIS — Z21 HUMAN IMMUNODEFICIENCY VIRUS I INFECTION (H): Primary | ICD-10-CM

## 2020-11-18 PROCEDURE — 99213 OFFICE O/P EST LOW 20 MIN: CPT | Mod: 95 | Performed by: INTERNAL MEDICINE

## 2020-11-18 RX ORDER — ABACAVIR SULFATE, DOLUTEGRAVIR SODIUM, LAMIVUDINE 600; 50; 300 MG/1; MG/1; MG/1
1 TABLET, FILM COATED ORAL DAILY
Qty: 90 TABLET | Refills: 3 | Status: SHIPPED | OUTPATIENT
Start: 2020-11-18 | End: 2021-11-29

## 2020-11-18 SDOH — HEALTH STABILITY: MENTAL HEALTH: HOW OFTEN DO YOU HAVE 6 OR MORE DRINKS ON ONE OCCASION?: WEEKLY

## 2020-11-18 SDOH — HEALTH STABILITY: MENTAL HEALTH: HOW MANY STANDARD DRINKS CONTAINING ALCOHOL DO YOU HAVE ON A TYPICAL DAY?: NOT ASKED

## 2020-11-18 SDOH — HEALTH STABILITY: MENTAL HEALTH: HOW OFTEN DO YOU HAVE A DRINK CONTAINING ALCOHOL?: 2-3 TIMES A WEEK

## 2020-11-18 ASSESSMENT — PAIN SCALES - GENERAL: PAINLEVEL: NO PAIN (0)

## 2020-11-18 NOTE — PROGRESS NOTES
"Iker Berrios is a 25 year old gentleman who is being evaluated via a billable telephone visit.    Patient states he was exposed to someone with COVID and plans to get a test on Friday, He states he is fatigued and may have a bit of a sore throat.      The patient has been notified of following:     \"This telephone visit will be conducted via a call between you and your physician/provider. We have found that certain health care needs can be provided without the need for a physical exam.  This service lets us provide the care you need with a short phone conversation.  If a prescription is necessary we can send it directly to your pharmacy.  If lab work is needed we can place an order for that and you can then stop by our lab to have the test done at a later time.    Telephone visits are billed at different rates depending on your insurance coverage. During this emergency period, for some insurers they may be billed the same as an in-person visit.  Please reach out to your insurance provider with any questions.    If during the course of the call the physician/provider feels a telephone visit is not appropriate, you will not be charged for this service.\"    Patient has given verbal consent for Telephone visit?  Yes    What phone number would you like to be contacted at? [phone]    How would you like to obtain your AVS? Adis    Phone call duration: 12 minutes (5:40 - 5:52 PM)    Division of Infectious Diseases and International Medicine  Department of Medicine        [facility] OUTPATIENT TELEPHONE VISIT NOTE [place] [address]  [address] [place] [address]  Office: [phone]  Fax:  [phone]     HISTORY OF PRESENT ILLNESS:  Iker Berrios is a pleasant 25-year-old gentleman with asymptomatic HIV infection (diagnosed 6/19/18, infection probably acquired in 3/18 when he had a viral syndrome) who has previously been followed in [facility] since 6/18 by Rikki" Jordyn, both of whom are no longer practicing out of our clinic.  He was most recently last seen by Dr. Chávez on 9/11/19 and was scheduled to returned for a somewhat belated follow-up appointment with me, but due to Covid-19 restrictions opted for a Virtual Telephone Visit today instead.  He says he has continued to take his antiretroviral therapy of Triumeq one tablet PO daily (since 7/9/18) with very few missed doses, the last time he completely missed a dose was in August or September, and that when he infrequently forgets he generally manages to remember to take the missed dose later in the day.  He notices no drug side effects from Triumeq.  He had routine HIV monitoring laboratory studies drawn in 7/20 which looked good except for a rina increased creatinine of 1.51 (although he has had a couple of others in the 1.5 range over the past 2.5 years).  He has a history of significant depression but that has been in remission with a stable mood for more than 1.5 years even though he decided to taper and finally self-discontinue his prior sertraline in ~ 6/20.  He ended psychological counseling in about 2/20.  He is feeling perhaps a mildly lower mood this month -- November is annually a more difficult depression month for him in past years.  He does not feel he needs any intervention at this time, but says that he can reconnect with his former counselor at any point if he needs do and will not hesitate to do so if his mood feels any worse.  Prior to gym closures for Covid-19, he was going to his gym to work out about four to five times per week.  He is not trying to exercise frequently on his home.  He had negative triple site STI screens in 7/20 with a most recent Chlamydia infection in 11/19 -- he does not believe he has had any likely exposures in recent months, especially because due to Covid-19 he has not been sexually active much in recent month and mostly has had the same single partner.  He  has had a mild sore throat and some fatigue over the past couple of days, so it scheduled for a Covid-19 screening test two days hence.  Beyond that, he has generally felt healthy over recent months and lacks other acute complaints today, including no recent febrile or EENT symptoms, fatigue, anorexia, cough, dyspnea, chest pain, GI or  symptoms, skin concerns, or neurological symptoms.    PAST MEDICAL HISTORY:  Past Medical History:   Diagnosis Date     Human immunodeficiency virus I infection (H) 06/19/2018    Likely infected 3/18.  On antiretroviral therapy with Triumeq since 7/9/18.  Pre-treatment HIV viral load 2,654 copies/ml.  Scott CD4+ cell count 763.   - Diagnosed HIV positive: 6/19/18 at [facility].  (Had been on Truvada for PrEP from 7719-6833. Tested HIV negative coming off of Truvada PrEP. He was noted to have a viral illness in March 2018 that lasted about 2 weeks).  HIV plasma viral load at diagnosis: 2654.  Absolute CD4 count at diagnosis: 783. Antiretroviral therapy started 7/9/18 with Triumeq.  HIV Genotype: 6/07/18 - No resistance found.  No history of HIV-related sequelae.  HLA B57:01 negative.  - Major depression at time of HIV diagnosis.  Resolved by mid-2019, off selective serotonin reuptake inhibitor therapy since 6/20, no further counseling by autumn 2019.  STI history:  6/19/2018 GC/Chlamydia Negative, 6/27/2018 RPR Negative, HCV negative.  11/8/2018: TPA nonreactive.  11/14/2018: GC/CT Urine and rectal positive, GC Throat positive.  1/16/19 and 4/17/19: GC/CT, treponema negative.  8/16/19: GC negative, CT positive from urine and rectum, treponema negative.    Past Surgical History:   Procedure Laterality Date     none       ALLERGIES:   No Known Allergies    MEDICATIONS:  Current Outpatient Medications   Medication Sig Dispense Refill     abacavir-dolutegravir-LamiVUDine (TRIUMEQ) 600- MG per tablet Take 1 tablet by mouth daily 90 tablet 3     SOCIAL HISTORY:   [employer] bachelor's degree in psychology, spring 2020.  Plans to take a graduate degree in Social Work.  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 Deployed to [place] for three years.  Sexual preference: MSM, both insertive and receptive, nonconsistent condom use.  Illicit drugs:  None    FAMILY HISTORY:  Family History   Problem Relation Age of Onset     Diabetes Maternal Grandmother      Asthma No family hx of      C.A.D. No family hx of      Hypertension No family hx of      Cerebrovascular Disease No family hx of      Cancer No family hx of      REVIEW OF SYSTEMS:  As per HPI.    PHYSICAL EXAMINATION:  Reported vitals:  Respirations sound normal.  There were no other vitals taken for this telephone visit.   Most aspects of the Physical Exam were unobtainable in the context of this telephone visit, but the following was ascertained:  GENERAL:  Pleasant, conversant, calm, comfortable-sounding.  ENT:  No evident hearing deficit.  Normal tone of voice and volume.  RESP: No cough, no audible wheezing.  Able to talk in full sentences.  NEURO:  Alert, oriented x 3, memory / cognition sound normal.  PSYCH: Normal affect, coherent speech, able to articulate logical thoughts and reason, no tangential thoughts, no hallucinations.    LABORATORY STUDIES (Reviewed with the patient during his appointment today):      Treponema Antibodies 07/17/2020 Nonreactive  NR^Nonreactive Final    Comment: Methodology Change: Test performed on the Dot[employer] Liaison XL by Treponema   pallidum Total Antibodies Assay as of 3.17.2020.       IFC Specimen 07/17/2020 Blood   Final     CD3 Mature T 07/17/2020 67  49 - 84 % Final     CD4 Grant T 07/17/2020 35  28 - 63 % Final     CD8 Suppressor T 07/17/2020 28  10 - 40 % Final     CD4:CD8 Ratio 07/17/2020 1.25* 1.40 - 2.60 Final     Absolute CD3 07/17/2020 1,684  603 - 2,990 cells/uL Final     Absolute CD4 07/17/2020 876  441 - 2,156 cells/uL Final     Absolute CD8  07/17/2020 712  125 - 1,312 cells/uL Final     HIV-1 RNA Quant Result 07/17/2020 HIV-1 RNA Not Detected  HIVND^HIV-1 RNA Not Detected [Copies]/mL Final    Comment: The MELINA AmpliPrep/MELINA TaqMan HIV-1 test is an FDA-approved in vitro   nucleic acid amplification test for the quantitation of HIV-1 RNA in human   plasma (EDTA plasma) using the MELINA AmpliPrep instrument for automated viral   nucleic acid extraction and the MELINA TaqMan Analyzer or MELINA TaqMan for   automated Real Time PCR amplification and detection of the viral nucleic acid   target.  Titer results are reported in copies/ml. This assay is intended for use in   conjunction with clinical presentation and other laboratory markers of disease   prognosis and for use as an aid in assessing viral response to antiretroviral   treatment as measured by changes in plasma HIV-1 RNA levels. This test should   not be used as a donor screening test to confirm the presence of HIV-1   infection.       HIV RNA QT Log 07/17/2020 Not Calculated  <1.3 [Log_copies]/mL Final     WBC 07/17/2020 6.1  4.0 - 11.0 10e9/L Final     RBC Count 07/17/2020 6.24* 4.4 - 5.9 10e12/L Final     Hemoglobin 07/17/2020 14.6  13.3 - 17.7 g/dL Final     Hematocrit 07/17/2020 47.9  40.0 - 53.0 % Final     MCV 07/17/2020 77* 78 - 100 fl Final     MCH 07/17/2020 23.4* 26.5 - 33.0 pg Final     MCHC 07/17/2020 30.5* 31.5 - 36.5 g/dL Final     RDW 07/17/2020 14.8  10.0 - 15.0 % Final     Platelet Count 07/17/2020 242  150 - 450 10e9/L Final     Diff Method 07/17/2020 Automated Method   Final     % Neutrophils 07/17/2020 48.5  % Final     % Lymphocytes 07/17/2020 37.3  % Final     % Monocytes 07/17/2020 12.1  % Final     % Eosinophils 07/17/2020 1.5  % Final     % Basophils 07/17/2020 0.3  % Final     % Immature Granulocytes 07/17/2020 0.3  % Final     Nucleated RBCs 07/17/2020 0  0 /100 Final     Absolute Neutrophil 07/17/2020 3.0  1.6 - 8.3 10e9/L Final     Absolute Lymphocytes 07/17/2020 2.3   0.8 - 5.3 10e9/L Final     Absolute Monocytes 07/17/2020 0.7  0.0 - 1.3 10e9/L Final     Absolute Eosinophils 07/17/2020 0.1  0.0 - 0.7 10e9/L Final     Absolute Basophils 07/17/2020 0.0  0.0 - 0.2 10e9/L Final     Abs Immature Granulocytes 07/17/2020 0.0  0 - 0.4 10e9/L Final     Absolute Nucleated RBC 07/17/2020 0.0   Final     Sodium 07/17/2020 139  133 - 144 mmol/L Final     Potassium 07/17/2020 3.8  3.4 - 5.3 mmol/L Final     Chloride 07/17/2020 107  94 - 109 mmol/L Final     Carbon Dioxide 07/17/2020 24  20 - 32 mmol/L Final     Anion Gap 07/17/2020 7  3 - 14 mmol/L Final     Glucose 07/17/2020 78  70 - 99 mg/dL Final     Urea Nitrogen 07/17/2020 18  7 - 30 mg/dL Final     Creatinine 07/17/2020 1.51* 0.66 - 1.25 mg/dL Final     GFR Estimate 07/17/2020 63  >60 mL/min/[1.73_m2] Final    Comment: Non  GFR Calc  Starting 12/18/2018, serum creatinine based estimated GFR (eGFR) will be   calculated using the Chronic Kidney Disease Epidemiology Collaboration   (CKD-EPI) equation.       GFR Estimate If Black 07/17/2020 72  >60 mL/min/[1.73_m2] Final    Comment:  GFR Calc  Starting 12/18/2018, serum creatinine based estimated GFR (eGFR) will be   calculated using the Chronic Kidney Disease Epidemiology Collaboration   (CKD-EPI) equation.       Calcium 07/17/2020 9.3  8.5 - 10.1 mg/dL Final     Bilirubin Total 07/17/2020 0.4  0.2 - 1.3 mg/dL Final     Albumin 07/17/2020 4.2  3.4 - 5.0 g/dL Final     Protein Total 07/17/2020 7.9  6.8 - 8.8 g/dL Final     Alkaline Phosphatase 07/17/2020 65  40 - 150 U/L Final     ALT 07/17/2020 32  0 - 70 U/L Final     AST 07/17/2020 33  0 - 45 U/L Final     Specimen Descrip 07/17/2020 Urine   Final     N Gonorrhea PCR 07/17/2020 Negative  NEG^Negative Final    Comment: Negative for N. gonorrhoeae rRNA by transcription mediated amplification.  A negative result by transcription mediated amplification does not preclude   the presence of N. gonorrhoeae  infection because results are dependent on   proper and adequate collection, absence of inhibitors, and sufficient rRNA to   be detected.       Specimen Description 07/17/2020 Urine   Final     Chlamydia Trachomatis PCR 07/17/2020 Negative  NEG^Negative Final    Comment: Negative for C. trachomatis rRNA by transcription mediated amplification.  A negative result by transcription mediated amplification does not preclude   the presence of C. trachomatis infection because results are dependent on   proper and adequate collection, absence of inhibitors, and sufficient rRNA to   be detected.     Allied Health/Nurse Visit on 07/17/2020   Component Date Value Ref Range Status     Specimen Description 07/17/2020 Throat   Final     Chlamydia Trachomatis PCR 07/17/2020 Negative  NEG^Negative Final    Comment: Negative for C. trachomatis rRNA by transcription mediated amplification.  A negative result by transcription mediated amplification does not preclude   the presence of C. trachomatis infection because results are dependent on   proper and adequate collection, absence of inhibitors, and sufficient rRNA to   be detected.  Throat and rectal specimen types have not been cleared by the FDA but have   been validated, demonstrating performance characteristics acceptable by the   Infectious Diseases Diagnostic Laboratory (IDDL) at [facility]. The IDDL is   regulated under CLIA as qualified to perform high-complexity testing. This   test is used for clinical purposes. It should not be regarded as   investigational or for research.       Specimen Description 07/17/2020 Rectal   Final     Chlamydia Trachomatis PCR 07/17/2020 Negative  NEG^Negative Final    Comment: Negative for C. trachomatis rRNA by transcription mediated amplification.  A negative result by transcription mediated amplification does not preclude   the presence of C. trachomatis infection because results are dependent on   proper and adequate collection, absence of  inhibitors, and sufficient rRNA to   be detected.  Throat and rectal specimen types have not been cleared by the FDA but have   been validated, demonstrating performance characteristics acceptable by the   Infectious Diseases Diagnostic Laboratory (IDDL) at [facility]. The IDDL is   regulated under CLIA as qualified to perform high-complexity testing. This   test is used for clinical purposes. It should not be regarded as   investigational or for research.       Specimen Descrip 07/17/2020 Throat   Final     N Gonorrhea PCR 07/17/2020 Negative  NEG^Negative Final    Comment: Negative for N. gonorrhoeae rRNA by transcription mediated amplification.  A negative result by transcription mediated amplification does not preclude   the presence of N. gonorrhoeae infection because results are dependent on   proper and adequate collection, absence of inhibitors, and sufficient rRNA to   be detected.  Throat and rectal specimen types have not been cleared by the FDA but have   been validated, demonstrating performance characteristics acceptable by the   Infectious Diseases Diagnostic Laboratory (IDDL) at [facility]. The IDDL is   regulated under CLIA as qualified to perform high-complexity testing. This   test is used for clinical purposes. It should not be regarded as   investigational or for research.       Specimen Descrip 07/17/2020 Rectal   Final     N Gonorrhea PCR 07/17/2020 Negative  NEG^Negative Final    Comment: Negative for N. gonorrhoeae rRNA by transcription mediated amplification.  A negative result by transcription mediated amplification does not preclude   the presence of N. gonorrhoeae infection because results are dependent on   proper and adequate collection, absence of inhibitors, and sufficient rRNA to   be detected.  Throat and rectal specimen types have not been cleared by the FDA but have   been validated, demonstrating performance characteristics acceptable by the   Infectious Diseases Diagnostic  Laboratory (IDDL) at [facility]. The IDDL is   regulated under CLIA as qualified to perform high-complexity testing. This   test is used for clinical purposes. It should not be regarded as   investigational or for research.       IMPRESSION/PLAN:    1. Asymptomatic, well-controlled HIV infection:  Diagnosed initially in 6/18.  On Triumeq since 7/18, he has had an absolute CD4+ cell count consistently > 700 for more than two years now with a persistently undetectable HIV plasma viral load.  His Triumeq tolerance and dosing adherence are excellent.  I applauded his excellent adherence.  He will remain on these medications and return to [facility] for follow-up in about eight months (with repeat monitoring labs at that time), or as needed before then.  2. History of STIs:  Last diagnosed with Chlamydia in 11/19 and treated then.  Three site routine screening for GC / Chlamydia was negative on 7/17/20.  No likely exposures since then, per the patient.  Will repeat STI testing next summer (or as needed before then).  3. Mildly elevated creatinine:  His 7/17/20 creatinine was 1.51 which is not outside of his baseline 1.3 - 1.5 range.  The etiology is unclear -- this predates his 6/18 HIV diagnosis.  Will repeat with next blood draw.  4. Healthcare maintenance:  Encouraged to receive an influenza vaccine this month.  May be overdue for a Tdap (last recorded in Epic on 1/17/06) -- repeat at next appointment. Received Menactra 10/2/20.  Received Prevnar 13 4/17/29; needs a Pneumovax 23 at next opportunity.  Received a Guardasil 9 series between 11/18 - 4/19.  Hepatitis A and B viruses seroimmune on 6/27/18 and hepatitis C antibody non-reactive most recently on 4/17/19.  TB Quantiferon Gold negative on 6/27/18.  CMV seropositive and Toxoplasma seronegative on 6/25/18.

## 2021-01-02 ENCOUNTER — MYC MEDICAL ADVICE (OUTPATIENT)
Dept: INFECTIOUS DISEASES | Facility: CLINIC | Age: 26
End: 2021-01-02

## 2021-01-02 DIAGNOSIS — Z21 HUMAN IMMUNODEFICIENCY VIRUS I INFECTION (H): ICD-10-CM

## 2021-01-02 DIAGNOSIS — Z11.3 ROUTINE SCREENING FOR STI (SEXUALLY TRANSMITTED INFECTION): Primary | ICD-10-CM

## 2021-01-05 ENCOUNTER — APPOINTMENT (OUTPATIENT)
Dept: INFECTIOUS DISEASES | Facility: CLINIC | Age: 26
End: 2021-01-05
Attending: INTERNAL MEDICINE
Payer: COMMERCIAL

## 2021-01-05 DIAGNOSIS — Z21 HUMAN IMMUNODEFICIENCY VIRUS I INFECTION (H): ICD-10-CM

## 2021-01-05 DIAGNOSIS — Z11.3 ROUTINE SCREENING FOR STI (SEXUALLY TRANSMITTED INFECTION): ICD-10-CM

## 2021-01-05 LAB
ALBUMIN SERPL-MCNC: 3.9 G/DL (ref 3.4–5)
ALP SERPL-CCNC: 65 U/L (ref 40–150)
ALT SERPL W P-5'-P-CCNC: 43 U/L (ref 0–70)
ANION GAP SERPL CALCULATED.3IONS-SCNC: 5 MMOL/L (ref 3–14)
AST SERPL W P-5'-P-CCNC: 34 U/L (ref 0–45)
BASOPHILS # BLD AUTO: 0 10E9/L (ref 0–0.2)
BASOPHILS NFR BLD AUTO: 0.3 %
BILIRUB SERPL-MCNC: 0.3 MG/DL (ref 0.2–1.3)
BUN SERPL-MCNC: 27 MG/DL (ref 7–30)
CALCIUM SERPL-MCNC: 9.5 MG/DL (ref 8.5–10.1)
CD3 CELLS # BLD: 1203 CELLS/UL (ref 603–2990)
CD3 CELLS NFR BLD: 68 % (ref 49–84)
CD3+CD4+ CELLS # BLD: 716 CELLS/UL (ref 441–2156)
CD3+CD4+ CELLS NFR BLD: 40 % (ref 28–63)
CD3+CD4+ CELLS/CD3+CD8+ CLL BLD: 1.67 % (ref 1.4–2.6)
CD3+CD8+ CELLS # BLD: 425 CELLS/UL (ref 125–1312)
CD3+CD8+ CELLS NFR BLD: 24 % (ref 10–40)
CHLORIDE SERPL-SCNC: 105 MMOL/L (ref 94–109)
CO2 SERPL-SCNC: 28 MMOL/L (ref 20–32)
CREAT SERPL-MCNC: 1.43 MG/DL (ref 0.66–1.25)
DIFFERENTIAL METHOD BLD: ABNORMAL
EOSINOPHIL # BLD AUTO: 0 10E9/L (ref 0–0.7)
EOSINOPHIL NFR BLD AUTO: 0.6 %
ERYTHROCYTE [DISTWIDTH] IN BLOOD BY AUTOMATED COUNT: 14.6 % (ref 10–15)
GFR SERPL CREATININE-BSD FRML MDRD: 67 ML/MIN/{1.73_M2}
GLUCOSE SERPL-MCNC: 81 MG/DL (ref 70–99)
HCT VFR BLD AUTO: 47 % (ref 40–53)
HGB BLD-MCNC: 14.4 G/DL (ref 13.3–17.7)
IFC SPECIMEN: NORMAL
IMM GRANULOCYTES # BLD: 0 10E9/L (ref 0–0.4)
IMM GRANULOCYTES NFR BLD: 0.4 %
LIPASE SERPL-CCNC: 131 U/L (ref 73–393)
LYMPHOCYTES # BLD AUTO: 1.7 10E9/L (ref 0.8–5.3)
LYMPHOCYTES NFR BLD AUTO: 24 %
MCH RBC QN AUTO: 23.4 PG (ref 26.5–33)
MCHC RBC AUTO-ENTMCNC: 30.6 G/DL (ref 31.5–36.5)
MCV RBC AUTO: 76 FL (ref 78–100)
MONOCYTES # BLD AUTO: 0.6 10E9/L (ref 0–1.3)
MONOCYTES NFR BLD AUTO: 8.5 %
NEUTROPHILS # BLD AUTO: 4.7 10E9/L (ref 1.6–8.3)
NEUTROPHILS NFR BLD AUTO: 66.2 %
NRBC # BLD AUTO: 0 10*3/UL
NRBC BLD AUTO-RTO: 0 /100
PLATELET # BLD AUTO: 281 10E9/L (ref 150–450)
POTASSIUM SERPL-SCNC: 4.2 MMOL/L (ref 3.4–5.3)
PROT SERPL-MCNC: 7.8 G/DL (ref 6.8–8.8)
RBC # BLD AUTO: 6.15 10E12/L (ref 4.4–5.9)
SODIUM SERPL-SCNC: 137 MMOL/L (ref 133–144)
T PALLIDUM AB SER QL: NONREACTIVE
WBC # BLD AUTO: 7 10E9/L (ref 4–11)

## 2021-01-05 PROCEDURE — 86359 T CELLS TOTAL COUNT: CPT | Mod: 90 | Performed by: PATHOLOGY

## 2021-01-05 PROCEDURE — 87591 N.GONORRHOEAE DNA AMP PROB: CPT | Mod: 90 | Performed by: PATHOLOGY

## 2021-01-05 PROCEDURE — 86360 T CELL ABSOLUTE COUNT/RATIO: CPT | Mod: 90 | Performed by: PATHOLOGY

## 2021-01-05 PROCEDURE — 36415 COLL VENOUS BLD VENIPUNCTURE: CPT | Performed by: PATHOLOGY

## 2021-01-05 PROCEDURE — 86780 TREPONEMA PALLIDUM: CPT | Mod: 90 | Performed by: PATHOLOGY

## 2021-01-05 PROCEDURE — 99000 SPECIMEN HANDLING OFFICE-LAB: CPT | Performed by: PATHOLOGY

## 2021-01-05 PROCEDURE — 83690 ASSAY OF LIPASE: CPT | Performed by: PATHOLOGY

## 2021-01-05 PROCEDURE — 87491 CHLMYD TRACH DNA AMP PROBE: CPT | Mod: 90 | Performed by: PATHOLOGY

## 2021-01-05 PROCEDURE — 80053 COMPREHEN METABOLIC PANEL: CPT | Performed by: PATHOLOGY

## 2021-01-05 PROCEDURE — 87591 N.GONORRHOEAE DNA AMP PROB: CPT | Performed by: INTERNAL MEDICINE

## 2021-01-05 PROCEDURE — 87536 HIV-1 QUANT&REVRSE TRNSCRPJ: CPT | Mod: 90 | Performed by: PATHOLOGY

## 2021-01-05 PROCEDURE — 87491 CHLMYD TRACH DNA AMP PROBE: CPT | Performed by: INTERNAL MEDICINE

## 2021-01-05 PROCEDURE — 85025 COMPLETE CBC W/AUTO DIFF WBC: CPT | Performed by: PATHOLOGY

## 2021-03-02 ENCOUNTER — MYC MEDICAL ADVICE (OUTPATIENT)
Dept: INFECTIOUS DISEASES | Facility: CLINIC | Age: 26
End: 2021-03-02

## 2021-03-02 ENCOUNTER — ALLIED HEALTH/NURSE VISIT (OUTPATIENT)
Dept: INFECTIOUS DISEASES | Facility: CLINIC | Age: 26
End: 2021-03-02
Attending: INTERNAL MEDICINE
Payer: COMMERCIAL

## 2021-03-02 DIAGNOSIS — Z11.3 ROUTINE SCREENING FOR STI (SEXUALLY TRANSMITTED INFECTION): ICD-10-CM

## 2021-03-02 DIAGNOSIS — Z11.3 SCREENING FOR STD (SEXUALLY TRANSMITTED DISEASE): Primary | ICD-10-CM

## 2021-03-02 DIAGNOSIS — Z11.3 ROUTINE SCREENING FOR STI (SEXUALLY TRANSMITTED INFECTION): Primary | ICD-10-CM

## 2021-03-02 LAB — T PALLIDUM AB SER QL: NONREACTIVE

## 2021-03-02 PROCEDURE — 99207 PR NO CHARGE NURSE ONLY: CPT

## 2021-03-02 PROCEDURE — 87591 N.GONORRHOEAE DNA AMP PROB: CPT | Performed by: PATHOLOGY

## 2021-03-02 PROCEDURE — 87491 CHLMYD TRACH DNA AMP PROBE: CPT | Performed by: PATHOLOGY

## 2021-03-02 PROCEDURE — 86780 TREPONEMA PALLIDUM: CPT | Mod: 90 | Performed by: PATHOLOGY

## 2021-03-02 PROCEDURE — 99000 SPECIMEN HANDLING OFFICE-LAB: CPT | Performed by: PATHOLOGY

## 2021-03-02 PROCEDURE — 36415 COLL VENOUS BLD VENIPUNCTURE: CPT | Performed by: PATHOLOGY

## 2021-03-02 NOTE — PROGRESS NOTES
STI swabs done per Dr. Shaw orders, pt's first name, last and  verified prior to specimen collection, pt tolerated well    Flavia Manzano, CMA CMA    3/2/2021 3:41 PM

## 2021-03-18 ENCOUNTER — IMMUNIZATION (OUTPATIENT)
Dept: NURSING | Facility: CLINIC | Age: 26
End: 2021-03-18
Payer: COMMERCIAL

## 2021-03-18 PROCEDURE — 91300 PR COVID VAC PFIZER DIL RECON 30 MCG/0.3 ML IM: CPT

## 2021-03-18 PROCEDURE — 0001A PR COVID VAC PFIZER DIL RECON 30 MCG/0.3 ML IM: CPT

## 2021-04-08 ENCOUNTER — IMMUNIZATION (OUTPATIENT)
Dept: NURSING | Facility: CLINIC | Age: 26
End: 2021-04-08
Attending: INTERNAL MEDICINE
Payer: COMMERCIAL

## 2021-04-08 PROCEDURE — 0002A PR COVID VAC PFIZER DIL RECON 30 MCG/0.3 ML IM: CPT

## 2021-04-08 PROCEDURE — 91300 PR COVID VAC PFIZER DIL RECON 30 MCG/0.3 ML IM: CPT

## 2021-04-17 ENCOUNTER — HEALTH MAINTENANCE LETTER (OUTPATIENT)
Age: 26
End: 2021-04-17

## 2021-06-09 ENCOUNTER — ALLIED HEALTH/NURSE VISIT (OUTPATIENT)
Dept: INFECTIOUS DISEASES | Facility: CLINIC | Age: 26
End: 2021-06-09
Payer: COMMERCIAL

## 2021-06-09 DIAGNOSIS — Z11.3 ROUTINE SCREENING FOR STI (SEXUALLY TRANSMITTED INFECTION): ICD-10-CM

## 2021-06-09 DIAGNOSIS — Z77.21 EXPOSURE TO POTENTIALLY HAZARDOUS BODY FLUIDS: Primary | ICD-10-CM

## 2021-06-09 PROCEDURE — 87491 CHLMYD TRACH DNA AMP PROBE: CPT | Performed by: INTERNAL MEDICINE

## 2021-06-09 PROCEDURE — 87591 N.GONORRHOEAE DNA AMP PROB: CPT | Performed by: INTERNAL MEDICINE

## 2021-06-09 PROCEDURE — 99000 SPECIMEN HANDLING OFFICE-LAB: CPT | Performed by: PATHOLOGY

## 2021-06-09 PROCEDURE — 87491 CHLMYD TRACH DNA AMP PROBE: CPT | Mod: 90 | Performed by: PATHOLOGY

## 2021-06-09 PROCEDURE — 87591 N.GONORRHOEAE DNA AMP PROB: CPT | Mod: 90 | Performed by: PATHOLOGY

## 2021-06-09 PROCEDURE — 36415 COLL VENOUS BLD VENIPUNCTURE: CPT | Performed by: PATHOLOGY

## 2021-06-09 PROCEDURE — 86780 TREPONEMA PALLIDUM: CPT | Mod: 90 | Performed by: PATHOLOGY

## 2021-06-09 NOTE — PROGRESS NOTES
STI swabs done per Dr. Shaw orders, pt's first name, last and  verified prior to specimen collection, pt tolerated well    Flavia Manzano, CMA CMA    2021 2:51 PM

## 2021-06-10 LAB
C TRACH DNA SPEC QL NAA+PROBE: NEGATIVE
N GONORRHOEA DNA SPEC QL NAA+PROBE: NEGATIVE
SPECIMEN SOURCE: NORMAL
T PALLIDUM AB SER QL: NONREACTIVE

## 2021-08-04 ENCOUNTER — TELEPHONE (OUTPATIENT)
Dept: INFECTIOUS DISEASES | Facility: CLINIC | Age: 26
End: 2021-08-04

## 2021-08-23 ENCOUNTER — MYC MEDICAL ADVICE (OUTPATIENT)
Dept: INFECTIOUS DISEASES | Facility: CLINIC | Age: 26
End: 2021-08-23

## 2021-08-23 DIAGNOSIS — Z11.3 ROUTINE SCREENING FOR STI (SEXUALLY TRANSMITTED INFECTION): Primary | ICD-10-CM

## 2021-08-23 DIAGNOSIS — Z21 ASYMPTOMATIC HUMAN IMMUNODEFICIENCY VIRUS (HIV) INFECTION STATUS (H): Primary | ICD-10-CM

## 2021-08-24 ENCOUNTER — LAB (OUTPATIENT)
Dept: LAB | Facility: CLINIC | Age: 26
End: 2021-08-24
Payer: COMMERCIAL

## 2021-08-24 ENCOUNTER — ALLIED HEALTH/NURSE VISIT (OUTPATIENT)
Dept: INFECTIOUS DISEASES | Facility: CLINIC | Age: 26
End: 2021-08-24
Attending: INTERNAL MEDICINE
Payer: COMMERCIAL

## 2021-08-24 DIAGNOSIS — Z11.3 ROUTINE SCREENING FOR STI (SEXUALLY TRANSMITTED INFECTION): ICD-10-CM

## 2021-08-24 DIAGNOSIS — Z21 ASYMPTOMATIC HUMAN IMMUNODEFICIENCY VIRUS (HIV) INFECTION STATUS (H): ICD-10-CM

## 2021-08-24 LAB
ALBUMIN SERPL-MCNC: 3.9 G/DL (ref 3.4–5)
ALP SERPL-CCNC: 69 U/L (ref 40–150)
ALT SERPL W P-5'-P-CCNC: 38 U/L (ref 0–70)
ANION GAP SERPL CALCULATED.3IONS-SCNC: 4 MMOL/L (ref 3–14)
AST SERPL W P-5'-P-CCNC: 27 U/L (ref 0–45)
BASOPHILS # BLD AUTO: 0 10E3/UL (ref 0–0.2)
BASOPHILS NFR BLD AUTO: 0 %
BILIRUB SERPL-MCNC: 0.3 MG/DL (ref 0.2–1.3)
BUN SERPL-MCNC: 23 MG/DL (ref 7–30)
CALCIUM SERPL-MCNC: 9.2 MG/DL (ref 8.5–10.1)
CD3 CELLS # BLD: 1436 CELLS/UL (ref 603–2990)
CD3 CELLS NFR BLD: 66 % (ref 49–84)
CD3+CD4+ CELLS # BLD: 749 CELLS/UL (ref 441–2156)
CD3+CD4+ CELLS NFR BLD: 35 % (ref 28–63)
CD3+CD4+ CELLS/CD3+CD8+ CLL BLD: 1.22 % (ref 1.4–2.6)
CD3+CD8+ CELLS # BLD: 612 CELLS/UL (ref 125–1312)
CD3+CD8+ CELLS NFR BLD: 28 % (ref 10–40)
CHLORIDE BLD-SCNC: 109 MMOL/L (ref 94–109)
CO2 SERPL-SCNC: 27 MMOL/L (ref 20–32)
CREAT SERPL-MCNC: 1.3 MG/DL (ref 0.66–1.25)
EOSINOPHIL # BLD AUTO: 0.1 10E3/UL (ref 0–0.7)
EOSINOPHIL NFR BLD AUTO: 1 %
ERYTHROCYTE [DISTWIDTH] IN BLOOD BY AUTOMATED COUNT: 13.9 % (ref 10–15)
GFR SERPL CREATININE-BSD FRML MDRD: 75 ML/MIN/1.73M2
GLUCOSE BLD-MCNC: 103 MG/DL (ref 70–99)
HCT VFR BLD AUTO: 47.8 % (ref 40–53)
HGB BLD-MCNC: 14.7 G/DL (ref 13.3–17.7)
IMM GRANULOCYTES # BLD: 0 10E3/UL
IMM GRANULOCYTES NFR BLD: 0 %
LYMPHOCYTES # BLD AUTO: 2.1 10E3/UL (ref 0.8–5.3)
LYMPHOCYTES NFR BLD AUTO: 41 %
MCH RBC QN AUTO: 23.6 PG (ref 26.5–33)
MCHC RBC AUTO-ENTMCNC: 30.8 G/DL (ref 31.5–36.5)
MCV RBC AUTO: 77 FL (ref 78–100)
MONOCYTES # BLD AUTO: 0.5 10E3/UL (ref 0–1.3)
MONOCYTES NFR BLD AUTO: 11 %
NEUTROPHILS # BLD AUTO: 2.4 10E3/UL (ref 1.6–8.3)
NEUTROPHILS NFR BLD AUTO: 47 %
NRBC # BLD AUTO: 0 10E3/UL
NRBC BLD AUTO-RTO: 0 /100
PLATELET # BLD AUTO: 243 10E3/UL (ref 150–450)
POTASSIUM BLD-SCNC: 4 MMOL/L (ref 3.4–5.3)
PROT SERPL-MCNC: 7.7 G/DL (ref 6.8–8.8)
RBC # BLD AUTO: 6.24 10E6/UL (ref 4.4–5.9)
SODIUM SERPL-SCNC: 140 MMOL/L (ref 133–144)
T CELL COMMENT: ABNORMAL
T PALLIDUM AB SER QL: NONREACTIVE
WBC # BLD AUTO: 5.2 10E3/UL (ref 4–11)

## 2021-08-24 PROCEDURE — 86359 T CELLS TOTAL COUNT: CPT | Performed by: INTERNAL MEDICINE

## 2021-08-24 PROCEDURE — 36415 COLL VENOUS BLD VENIPUNCTURE: CPT | Performed by: PATHOLOGY

## 2021-08-24 PROCEDURE — 86780 TREPONEMA PALLIDUM: CPT | Performed by: INTERNAL MEDICINE

## 2021-08-24 PROCEDURE — 87491 CHLMYD TRACH DNA AMP PROBE: CPT

## 2021-08-24 PROCEDURE — 87491 CHLMYD TRACH DNA AMP PROBE: CPT | Performed by: INTERNAL MEDICINE

## 2021-08-24 PROCEDURE — 87536 HIV-1 QUANT&REVRSE TRNSCRPJ: CPT | Performed by: INTERNAL MEDICINE

## 2021-08-24 PROCEDURE — 87591 N.GONORRHOEAE DNA AMP PROB: CPT | Performed by: INTERNAL MEDICINE

## 2021-08-24 PROCEDURE — 85025 COMPLETE CBC W/AUTO DIFF WBC: CPT | Performed by: PATHOLOGY

## 2021-08-24 PROCEDURE — 80053 COMPREHEN METABOLIC PANEL: CPT | Performed by: PATHOLOGY

## 2021-08-24 PROCEDURE — 87591 N.GONORRHOEAE DNA AMP PROB: CPT

## 2021-08-25 LAB
C TRACH DNA SPEC QL NAA+PROBE: NEGATIVE
C TRACH DNA SPEC QL NAA+PROBE: POSITIVE
C TRACH DNA SPEC QL NAA+PROBE: POSITIVE
N GONORRHOEA DNA SPEC QL NAA+PROBE: NEGATIVE

## 2021-08-26 DIAGNOSIS — A74.9 CHLAMYDIA: Primary | ICD-10-CM

## 2021-08-26 RX ORDER — AZITHROMYCIN 500 MG/1
1000 TABLET, FILM COATED ORAL ONCE
Qty: 2 TABLET | Refills: 0 | Status: SHIPPED | OUTPATIENT
Start: 2021-08-26 | End: 2021-08-26

## 2021-08-26 NOTE — PROGRESS NOTES
Case report submitted to MD for chlamydia lab positive collected on 8/24/2021.     Penny Rebolledo, Infection Prevention

## 2021-08-27 LAB — HIV1 RNA # PLAS NAA DL=20: NOT DETECTED COPIES/ML

## 2021-09-14 ENCOUNTER — TELEPHONE (OUTPATIENT)
Dept: INFECTIOUS DISEASES | Facility: CLINIC | Age: 26
End: 2021-09-14

## 2021-09-14 NOTE — TELEPHONE ENCOUNTER
----- Message from Michel Shaw MD sent at 9/9/2021  8:17 PM CDT -----  Regarding: needs 2022 appt  Clinic Coordinators,  No hurry, but could you please schedule Mr. Dong for a routine follow-up appointment with me in about February 2022 (six months).  (And, if you remember, thank him for getting his labs drawn a couple of weeks ago.)  Thanks much,  Michel

## 2021-09-26 ENCOUNTER — HEALTH MAINTENANCE LETTER (OUTPATIENT)
Age: 26
End: 2021-09-26

## 2021-09-28 ENCOUNTER — TELEPHONE (OUTPATIENT)
Dept: INFECTIOUS DISEASES | Facility: CLINIC | Age: 26
End: 2021-09-28

## 2021-09-28 NOTE — TELEPHONE ENCOUNTER
Called patient- states he went to an urgent care and tested positive for strep throat. Pt received amoxicillin x10 days, throat is feeling OK. Will hydrate and take OTC ibu/tylenol for pain/discomfort.

## 2021-10-28 ENCOUNTER — IMMUNIZATION (OUTPATIENT)
Dept: NURSING | Facility: CLINIC | Age: 26
End: 2021-10-28
Payer: COMMERCIAL

## 2021-10-28 PROCEDURE — 90471 IMMUNIZATION ADMIN: CPT

## 2021-10-28 PROCEDURE — 0004A PR COVID VAC PFIZER DIL RECON 30 MCG/0.3 ML IM: CPT

## 2021-10-28 PROCEDURE — 90686 IIV4 VACC NO PRSV 0.5 ML IM: CPT

## 2021-10-28 PROCEDURE — 91300 PR COVID VAC PFIZER DIL RECON 30 MCG/0.3 ML IM: CPT

## 2021-11-09 ENCOUNTER — TRANSFERRED RECORDS (OUTPATIENT)
Dept: HEALTH INFORMATION MANAGEMENT | Facility: CLINIC | Age: 26
End: 2021-11-09
Payer: COMMERCIAL

## 2021-11-28 DIAGNOSIS — Z21 HUMAN IMMUNODEFICIENCY VIRUS I INFECTION (H): ICD-10-CM

## 2021-11-29 RX ORDER — ABACAVIR SULFATE, DOLUTEGRAVIR SODIUM, LAMIVUDINE 600; 50; 300 MG/1; MG/1; MG/1
TABLET, FILM COATED ORAL
Qty: 90 TABLET | Refills: 0 | Status: SHIPPED | OUTPATIENT
Start: 2021-11-29 | End: 2022-03-02

## 2021-12-10 ENCOUNTER — LAB (OUTPATIENT)
Dept: LAB | Facility: CLINIC | Age: 26
End: 2021-12-10

## 2021-12-10 ENCOUNTER — ALLIED HEALTH/NURSE VISIT (OUTPATIENT)
Dept: INFECTIOUS DISEASES | Facility: CLINIC | Age: 26
End: 2021-12-10
Attending: INTERNAL MEDICINE
Payer: COMMERCIAL

## 2021-12-10 DIAGNOSIS — Z11.3 SCREENING FOR STD (SEXUALLY TRANSMITTED DISEASE): ICD-10-CM

## 2021-12-10 DIAGNOSIS — Z11.3 SCREENING FOR STD (SEXUALLY TRANSMITTED DISEASE): Primary | ICD-10-CM

## 2021-12-10 LAB — T PALLIDUM AB SER QL: NONREACTIVE

## 2021-12-10 PROCEDURE — 86780 TREPONEMA PALLIDUM: CPT | Mod: 90 | Performed by: PATHOLOGY

## 2021-12-10 PROCEDURE — 87491 CHLMYD TRACH DNA AMP PROBE: CPT

## 2021-12-10 PROCEDURE — 99000 SPECIMEN HANDLING OFFICE-LAB: CPT | Performed by: PATHOLOGY

## 2021-12-10 PROCEDURE — 36415 COLL VENOUS BLD VENIPUNCTURE: CPT | Performed by: PATHOLOGY

## 2021-12-10 PROCEDURE — 87591 N.GONORRHOEAE DNA AMP PROB: CPT

## 2021-12-11 LAB
C TRACH DNA SPEC QL NAA+PROBE: NEGATIVE
N GONORRHOEA DNA SPEC QL NAA+PROBE: NEGATIVE

## 2022-02-04 ENCOUNTER — ALLIED HEALTH/NURSE VISIT (OUTPATIENT)
Dept: INFECTIOUS DISEASES | Facility: CLINIC | Age: 27
End: 2022-02-04
Attending: INTERNAL MEDICINE
Payer: COMMERCIAL

## 2022-02-04 ENCOUNTER — LAB (OUTPATIENT)
Dept: LAB | Facility: CLINIC | Age: 27
End: 2022-02-04
Payer: COMMERCIAL

## 2022-02-04 DIAGNOSIS — Z11.3 SCREENING FOR STD (SEXUALLY TRANSMITTED DISEASE): ICD-10-CM

## 2022-02-04 DIAGNOSIS — Z11.3 SCREENING FOR STD (SEXUALLY TRANSMITTED DISEASE): Primary | ICD-10-CM

## 2022-02-04 LAB
ALBUMIN SERPL-MCNC: 4.1 G/DL (ref 3.4–5)
ALP SERPL-CCNC: 74 U/L (ref 40–150)
ALT SERPL W P-5'-P-CCNC: 48 U/L (ref 0–70)
ANION GAP SERPL CALCULATED.3IONS-SCNC: 7 MMOL/L (ref 3–14)
AST SERPL W P-5'-P-CCNC: 34 U/L (ref 0–45)
BASOPHILS # BLD AUTO: 0 10E3/UL (ref 0–0.2)
BASOPHILS NFR BLD AUTO: 0 %
BILIRUB SERPL-MCNC: 0.3 MG/DL (ref 0.2–1.3)
BUN SERPL-MCNC: 25 MG/DL (ref 7–30)
CALCIUM SERPL-MCNC: 9.7 MG/DL (ref 8.5–10.1)
CHLORIDE BLD-SCNC: 105 MMOL/L (ref 94–109)
CO2 SERPL-SCNC: 26 MMOL/L (ref 20–32)
CREAT SERPL-MCNC: 1.38 MG/DL (ref 0.66–1.25)
EOSINOPHIL # BLD AUTO: 0.1 10E3/UL (ref 0–0.7)
EOSINOPHIL NFR BLD AUTO: 1 %
ERYTHROCYTE [DISTWIDTH] IN BLOOD BY AUTOMATED COUNT: 14.7 % (ref 10–15)
GFR SERPL CREATININE-BSD FRML MDRD: 71 ML/MIN/1.73M2
GLUCOSE BLD-MCNC: 72 MG/DL (ref 70–99)
HCT VFR BLD AUTO: 47.4 % (ref 40–53)
HGB BLD-MCNC: 14.5 G/DL (ref 13.3–17.7)
IMM GRANULOCYTES # BLD: 0 10E3/UL
IMM GRANULOCYTES NFR BLD: 0 %
LYMPHOCYTES # BLD AUTO: 1.8 10E3/UL (ref 0.8–5.3)
LYMPHOCYTES NFR BLD AUTO: 33 %
MCH RBC QN AUTO: 23.1 PG (ref 26.5–33)
MCHC RBC AUTO-ENTMCNC: 30.6 G/DL (ref 31.5–36.5)
MCV RBC AUTO: 76 FL (ref 78–100)
MONOCYTES # BLD AUTO: 0.6 10E3/UL (ref 0–1.3)
MONOCYTES NFR BLD AUTO: 10 %
NEUTROPHILS # BLD AUTO: 3.1 10E3/UL (ref 1.6–8.3)
NEUTROPHILS NFR BLD AUTO: 56 %
NRBC # BLD AUTO: 0 10E3/UL
NRBC BLD AUTO-RTO: 0 /100
PLATELET # BLD AUTO: 260 10E3/UL (ref 150–450)
POTASSIUM BLD-SCNC: 4.3 MMOL/L (ref 3.4–5.3)
PROT SERPL-MCNC: 8.1 G/DL (ref 6.8–8.8)
RBC # BLD AUTO: 6.28 10E6/UL (ref 4.4–5.9)
SODIUM SERPL-SCNC: 138 MMOL/L (ref 133–144)
WBC # BLD AUTO: 5.5 10E3/UL (ref 4–11)

## 2022-02-04 PROCEDURE — 87536 HIV-1 QUANT&REVRSE TRNSCRPJ: CPT | Mod: 90 | Performed by: PATHOLOGY

## 2022-02-04 PROCEDURE — 86780 TREPONEMA PALLIDUM: CPT | Mod: 90 | Performed by: PATHOLOGY

## 2022-02-04 PROCEDURE — 87591 N.GONORRHOEAE DNA AMP PROB: CPT

## 2022-02-04 PROCEDURE — 99000 SPECIMEN HANDLING OFFICE-LAB: CPT | Performed by: PATHOLOGY

## 2022-02-04 PROCEDURE — 36415 COLL VENOUS BLD VENIPUNCTURE: CPT | Performed by: PATHOLOGY

## 2022-02-04 PROCEDURE — 86359 T CELLS TOTAL COUNT: CPT | Mod: 90 | Performed by: PATHOLOGY

## 2022-02-04 PROCEDURE — 86360 T CELL ABSOLUTE COUNT/RATIO: CPT | Mod: 90 | Performed by: PATHOLOGY

## 2022-02-04 PROCEDURE — 87491 CHLMYD TRACH DNA AMP PROBE: CPT

## 2022-02-04 PROCEDURE — 80053 COMPREHEN METABOLIC PANEL: CPT | Performed by: PATHOLOGY

## 2022-02-04 PROCEDURE — 85025 COMPLETE CBC W/AUTO DIFF WBC: CPT | Performed by: PATHOLOGY

## 2022-02-04 NOTE — NURSING NOTE
Swabs collected from throat and rectum and sent to lab for processing. Also urine sample was collected and sent to lab for processing.    Margarita Evangelista CMA

## 2022-02-05 LAB
C TRACH DNA SPEC QL NAA+PROBE: NEGATIVE
CD3 CELLS # BLD: 1324 CELLS/UL (ref 603–2990)
CD3 CELLS NFR BLD: 68 % (ref 49–84)
CD3+CD4+ CELLS # BLD: 755 CELLS/UL (ref 441–2156)
CD3+CD4+ CELLS NFR BLD: 39 % (ref 28–63)
CD3+CD4+ CELLS/CD3+CD8+ CLL BLD: 1.54 % (ref 1.4–2.6)
CD3+CD8+ CELLS # BLD: 490 CELLS/UL (ref 125–1312)
CD3+CD8+ CELLS NFR BLD: 25 % (ref 10–40)
N GONORRHOEA DNA SPEC QL NAA+PROBE: NEGATIVE
N GONORRHOEA DNA SPEC QL NAA+PROBE: NEGATIVE
N GONORRHOEA DNA SPEC QL NAA+PROBE: POSITIVE
T CELL COMMENT: NORMAL
T PALLIDUM AB SER QL: NONREACTIVE

## 2022-02-07 ENCOUNTER — ALLIED HEALTH/NURSE VISIT (OUTPATIENT)
Dept: INFECTIOUS DISEASES | Facility: CLINIC | Age: 27
End: 2022-02-07
Attending: INTERNAL MEDICINE
Payer: COMMERCIAL

## 2022-02-07 DIAGNOSIS — A54.9 GONORRHEA: ICD-10-CM

## 2022-02-07 DIAGNOSIS — A54.9 GONORRHEA: Primary | ICD-10-CM

## 2022-02-07 LAB — HIV1 RNA # PLAS NAA DL=20: NOT DETECTED COPIES/ML

## 2022-02-07 PROCEDURE — 250N000011 HC RX IP 250 OP 636: Performed by: INTERNAL MEDICINE

## 2022-02-07 PROCEDURE — 96372 THER/PROPH/DIAG INJ SC/IM: CPT | Performed by: INTERNAL MEDICINE

## 2022-02-07 PROCEDURE — 250N000009 HC RX 250: Performed by: INTERNAL MEDICINE

## 2022-02-07 RX ORDER — LIDOCAINE HYDROCHLORIDE 10 MG/ML
1 INJECTION, SOLUTION EPIDURAL; INFILTRATION; INTRACAUDAL; PERINEURAL ONCE
Status: CANCELLED
Start: 2022-02-07 | End: 2022-02-07

## 2022-02-07 RX ORDER — LIDOCAINE HYDROCHLORIDE 10 MG/ML
1 INJECTION, SOLUTION EPIDURAL; INFILTRATION; INTRACAUDAL; PERINEURAL ONCE
Status: COMPLETED | OUTPATIENT
Start: 2022-02-07 | End: 2022-02-07

## 2022-02-07 RX ADMIN — CEFTRIAXONE SODIUM 500 MG: 500 INJECTION, POWDER, FOR SOLUTION INTRAMUSCULAR; INTRAVENOUS at 13:45

## 2022-02-07 RX ADMIN — LIDOCAINE HYDROCHLORIDE 1 ML: 10 INJECTION, SOLUTION EPIDURAL; INFILTRATION; INTRACAUDAL; PERINEURAL at 13:47

## 2022-02-07 NOTE — PROGRESS NOTES
Case report sent to [facility] for lab positive Gonorrhea collected on 2/4/22.    Eugenie Harley RN, Infection Preventionist  [phone]

## 2022-03-02 DIAGNOSIS — Z21 HUMAN IMMUNODEFICIENCY VIRUS I INFECTION (H): ICD-10-CM

## 2022-03-02 RX ORDER — ABACAVIR SULFATE, DOLUTEGRAVIR SODIUM, LAMIVUDINE 600; 50; 300 MG/1; MG/1; MG/1
TABLET, FILM COATED ORAL
Qty: 90 TABLET | Refills: 0 | Status: SHIPPED | OUTPATIENT
Start: 2022-03-02 | End: 2022-04-06

## 2022-04-06 ENCOUNTER — OFFICE VISIT (OUTPATIENT)
Dept: INFECTIOUS DISEASES | Facility: CLINIC | Age: 27
End: 2022-04-06
Attending: INTERNAL MEDICINE
Payer: COMMERCIAL

## 2022-04-06 ENCOUNTER — LAB (OUTPATIENT)
Dept: LAB | Facility: CLINIC | Age: 27
End: 2022-04-06
Payer: COMMERCIAL

## 2022-04-06 VITALS
HEART RATE: 76 BPM | OXYGEN SATURATION: 93 % | TEMPERATURE: 98.3 F | SYSTOLIC BLOOD PRESSURE: 108 MMHG | DIASTOLIC BLOOD PRESSURE: 67 MMHG | HEIGHT: 69 IN | BODY MASS INDEX: 33.02 KG/M2 | WEIGHT: 222.9 LBS

## 2022-04-06 DIAGNOSIS — Z77.21 PATIENT EXPOSURE TO BODY FLUIDS: ICD-10-CM

## 2022-04-06 DIAGNOSIS — Z21 HUMAN IMMUNODEFICIENCY VIRUS I INFECTION (H): ICD-10-CM

## 2022-04-06 DIAGNOSIS — Z86.19 HISTORY OF GONORRHEA: ICD-10-CM

## 2022-04-06 DIAGNOSIS — Z23 NEED FOR PNEUMOCOCCAL VACCINATION: ICD-10-CM

## 2022-04-06 DIAGNOSIS — Z23 NEED FOR TETANUS BOOSTER: ICD-10-CM

## 2022-04-06 DIAGNOSIS — Z21 HUMAN IMMUNODEFICIENCY VIRUS I INFECTION (H): Primary | ICD-10-CM

## 2022-04-06 PROCEDURE — G0463 HOSPITAL OUTPT CLINIC VISIT: HCPCS | Mod: 25

## 2022-04-06 PROCEDURE — 90472 IMMUNIZATION ADMIN EACH ADD: CPT | Performed by: INTERNAL MEDICINE

## 2022-04-06 PROCEDURE — 87491 CHLMYD TRACH DNA AMP PROBE: CPT | Mod: 90 | Performed by: PATHOLOGY

## 2022-04-06 PROCEDURE — 90715 TDAP VACCINE 7 YRS/> IM: CPT | Performed by: INTERNAL MEDICINE

## 2022-04-06 PROCEDURE — 36415 COLL VENOUS BLD VENIPUNCTURE: CPT | Performed by: PATHOLOGY

## 2022-04-06 PROCEDURE — G0009 ADMIN PNEUMOCOCCAL VACCINE: HCPCS | Performed by: INTERNAL MEDICINE

## 2022-04-06 PROCEDURE — 87491 CHLMYD TRACH DNA AMP PROBE: CPT | Performed by: INTERNAL MEDICINE

## 2022-04-06 PROCEDURE — 250N000011 HC RX IP 250 OP 636: Performed by: INTERNAL MEDICINE

## 2022-04-06 PROCEDURE — 90732 PPSV23 VACC 2 YRS+ SUBQ/IM: CPT | Performed by: INTERNAL MEDICINE

## 2022-04-06 PROCEDURE — 87591 N.GONORRHOEAE DNA AMP PROB: CPT | Performed by: INTERNAL MEDICINE

## 2022-04-06 PROCEDURE — 87591 N.GONORRHOEAE DNA AMP PROB: CPT | Mod: 90 | Performed by: PATHOLOGY

## 2022-04-06 PROCEDURE — 99214 OFFICE O/P EST MOD 30 MIN: CPT | Performed by: INTERNAL MEDICINE

## 2022-04-06 PROCEDURE — 99000 SPECIMEN HANDLING OFFICE-LAB: CPT | Performed by: PATHOLOGY

## 2022-04-06 PROCEDURE — 86780 TREPONEMA PALLIDUM: CPT | Mod: 90 | Performed by: PATHOLOGY

## 2022-04-06 RX ORDER — ABACAVIR SULFATE, DOLUTEGRAVIR SODIUM, LAMIVUDINE 600; 50; 300 MG/1; MG/1; MG/1
1 TABLET, FILM COATED ORAL DAILY
Qty: 90 TABLET | Refills: 3 | Status: SHIPPED | OUTPATIENT
Start: 2022-04-06 | End: 2022-06-06

## 2022-04-06 RX ADMIN — PNEUMOCOCCAL VACCINE POLYVALENT 0.5 ML
25; 25; 25; 25; 25; 25; 25; 25; 25; 25; 25; 25; 25; 25; 25; 25; 25; 25; 25; 25; 25; 25; 25 INJECTION, SOLUTION INTRAMUSCULAR; SUBCUTANEOUS at 18:36

## 2022-04-06 RX ADMIN — TETANUS TOXOID, REDUCED DIPHTHERIA TOXOID AND ACELLULAR PERTUSSIS VACCINE, ADSORBED 0.5 ML: 5; 2.5; 8; 8; 2.5 SUSPENSION INTRAMUSCULAR at 18:38

## 2022-04-06 ASSESSMENT — ENCOUNTER SYMPTOMS
SINUS PAIN: 1
NECK MASS: 0
TASTE DISTURBANCE: 0
TROUBLE SWALLOWING: 0
SMELL DISTURBANCE: 0
SINUS CONGESTION: 0
SORE THROAT: 0
HOARSE VOICE: 0

## 2022-04-06 ASSESSMENT — PAIN SCALES - GENERAL: PAINLEVEL: NO PAIN (0)

## 2022-04-06 NOTE — NURSING NOTE
"Chief Complaint   Patient presents with     RECHECK     b20     /67   Pulse 76   Temp 98.3  F (36.8  C) (Oral)   Ht 1.753 m (5' 9\")   Wt 101.1 kg (222 lb 14.4 oz)   SpO2 93%   BMI 32.92 kg/m        Jesús Morgan MA  "

## 2022-04-07 LAB
C TRACH DNA SPEC QL NAA+PROBE: NEGATIVE
N GONORRHOEA DNA SPEC QL NAA+PROBE: NEGATIVE
T PALLIDUM AB SER QL: NONREACTIVE

## 2022-04-07 NOTE — PROGRESS NOTES
Division of Infectious Diseases and International Medicine  Department of Medicine        [facility] OUTPATIENT VISIT NOTE Spring Mail Code 250  [address] [place] [address]  Office: [phone]  Fax:  [phone]     HISTORY OF PRESENT ILLNESS:    Mr. Berrios is a 26-year-old gentleman with asymptomatic HIV infection (diagnosed 6/19/18, infection probably acquired in 3/18 when he had a viral syndrome) who returns today to [facility] for a belated annual follow-up of his antiretroviral therapy consisting of Triumeq one tablet PO each evening (started 7/9/18).  He says that he has missed on average about two doses per month in recent months, generally just forgetting when he is going out socially on a weekend night.  He lacks Triumeq side effects.  He had routine HIV monitoring laboratory studies drawn on 2/4/22 which looked good with a chronically elevated but stable creatinine at 1.38.  At throat gonorrhea PCR screen was positive on that day which was treated with IM ceftriaxone on 2/7/22 at a Nursing Visit.  He would like repeat STI testing today because his steady partner has had another partner.  He has avoided Covid-19 infection over the past two years and received Pfizer Covid-19 vaccinations on 3/18/21, 4/8/21, and 10/28/21, but is due for a Tdap booster and a Pneumovax 23 vaccination.  He is stable and doing well from a mood standpoint over the past nearly-3 years even though he discontinued taking sertraline in ~ 6/20.  He continues to exercise regularly -- about four to five times per week (at a gym).  He started a Master's Degree graduate school program in Integrated Behavioral Health at the [employer] and hopes to finish it by summer 2024 (by taking a heavy load of courses each semester including summer session) and is enjoying his studies.  He is not presently working.  He reports he has generally felt healthy over the past year+ and lacks  concerns or complaints today, including no recent febrile or EENT symptoms, cough, dyspnea, chest pain, GI or  symptoms, skin issues, or neurological symptoms.    PAST MEDICAL HISTORY:  Past Medical History:   Diagnosis Date     Human immunodeficiency virus I infection (H) 06/19/2018    Likely infected 3/18.  On antiretroviral therapy with Triumeq since 7/9/18.  Pre-treatment HIV viral load 2,654 copies/ml.  Scott CD4+ cell count 763.   - Diagnosed HIV positive: 6/19/18 at [facility].  (Had been on Truvada for PrEP from 6914-0628. Tested HIV negative coming off of Truvada PrEP. He was noted to have a viral illness in March 2018 that lasted about 2 weeks).  HIV plasma viral load at diagnosis: 2654.  Absolute CD4 count at diagnosis: 783. Antiretroviral therapy started 7/9/18 with Triumeq.  HIV Genotype: 6/07/18 - No resistance found.  No history of HIV-related sequelae.  HLA B57:01 negative.  - Major depression at time of HIV diagnosis.  Resolved by mid-2019, off selective serotonin reuptake inhibitor therapy since 6/20, no further counseling by autumn 2019.  - STI history:  6/19/2018 GC/Chlamydia Negative, 6/27/2018 RPR Negative, HCV negative.  11/8/2018: TPA nonreactive.  11/14/2018: GC/CT Urine and rectal positive, GC Throat positive.  1/16/19 and 4/17/19: GC/CT, treponema negative.  8/16/19: GC negative, CT positive from urine and rectum, treponema negative.  Throat again positive for gonorrhea 2/4/22.    Past Surgical History:   Procedure Laterality Date     none       ALLERGIES:   No Known Allergies    MEDICATIONS:  Current Outpatient Medications   Medication Sig Dispense Refill     abacavir-dolutegravir-LamiVUDine (TRIUMEQ) 600- MG per tablet Take 1 tablet by mouth daily 90 tablet 3     SOCIAL HISTORY:  [employer] bachelor's degree in psychology, spring 2020.  Began a graduate Master's Degree program in Social Work at the [employer] in autumn 2022 focusing on  "Integrated Behvioral Health.  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 Deployed to [place] for three years.  Sexual preference: MSM, both insertive and receptive, inconsistent condom use.  Illicit drugs:  None.  Exercises at a gym four to five times per week.    FAMILY HISTORY:  Family History   Problem Relation Age of Onset     Diabetes Maternal Grandmother      Asthma No family hx of      C.A.D. No family hx of      Hypertension No family hx of      Cerebrovascular Disease No family hx of      Cancer No family hx of      REVIEW OF SYSTEMS:  As per HPI.    PHYSICAL EXAMINATION:  General:  A pleasant, conversant, WDWN, 26-year-old man in NAD.  Vital signs:  /67   Pulse 76   Temp 98.3  F (36.8  C) (Oral)   Ht 1.753 m (5' 9\")   Wt 101.1 kg (222 lb 14.4 oz)   SpO2 93%   BMI 32.92 kg/m   .  Skin:  No acute rash or lesion.  Head/Neck:  NCAT.  External auditory canals are patent without discharge.  PERRL.  EOMI.  Conjunctivae are pink without injection.  Sclerae are white.  Oropharynx lacks erythema, exudate, or lesion.  Dentition appears to be in good repair.  Neck is supple, no lymphadenopathy or thyromegaly.  Lungs:  Bilaterally clear to auscultation.  CV:  RRR.  Normal S1, S2, without gallop, murmur, or rub.  Abdomen:  Bowel sounds active, soft, nontender, no hepatosplenomegaly.  Back:  No lumbar or CVA tenderness.  Extremities:  Distally warm, no edema.  Neuro:  Alert, oriented, memory/cognition/affect are normal, gait is normal.    LABORATORY STUDIES (Reviewed with the patient during his appointment today):      Neisseria gonorrhoeae 04/06/2022 Negative  Negative Final    Negative for N. gonorrhoeae rRNA by transcription mediated amplification. A negative result by transcription mediated amplification does not preclude the presence of C. trachomatis infection because results are dependent on proper and adequate collection, absence of inhibitors and sufficient rRNA to be detected.     " Chlamydia trachomatis 04/06/2022 Negative  Negative Final    A negative result by transcription mediated amplification does not preclude the presence of C. trachomatis infection because results are dependent on proper and adequate collection, absence of inhibitors and sufficient rRNA to be detected.     Treponema Antibody Total 04/06/2022 Nonreactive  Nonreactive Final   Office Visit on 04/06/2022   Component Date Value Ref Range Status     Chlamydia trachomatis 04/06/2022 Negative  Negative Final    A negative result by transcription mediated amplification does not preclude the presence of C. trachomatis infection because results are dependent on proper and adequate collection, absence of inhibitors and sufficient rRNA to be detected.     Chlamydia trachomatis 04/06/2022 Negative  Negative Final    A negative result by transcription mediated amplification does not preclude the presence of C. trachomatis infection because results are dependent on proper and adequate collection, absence of inhibitors and sufficient rRNA to be detected.     Neisseria gonorrhoeae 04/06/2022 Negative  Negative Final    Negative for N. gonorrhoeae rRNA by transcription mediated amplification. A negative result by transcription mediated amplification does not preclude the presence of C. trachomatis infection because results are dependent on proper and adequate collection, absence of inhibitors and sufficient rRNA to be detected.     Neisseria gonorrhoeae 04/06/2022 Negative  Negative Final    Negative for N. gonorrhoeae rRNA by transcription mediated amplification. A negative result by transcription mediated amplification does not preclude the presence of C. trachomatis infection because results are dependent on proper and adequate collection, absence of inhibitors and sufficient rRNA to be detected.   Lab on 02/04/2022   Component Date Value Ref Range Status     Treponema Antibody Total 02/04/2022 Nonreactive  Nonreactive Final      Sodium 02/04/2022 138  133 - 144 mmol/L Final     Potassium 02/04/2022 4.3  3.4 - 5.3 mmol/L Final     Chloride 02/04/2022 105  94 - 109 mmol/L Final     Carbon Dioxide (CO2) 02/04/2022 26  20 - 32 mmol/L Final     Anion Gap 02/04/2022 7  3 - 14 mmol/L Final     Urea Nitrogen 02/04/2022 25  7 - 30 mg/dL Final     Creatinine 02/04/2022 1.38 (A) 0.66 - 1.25 mg/dL Final     Calcium 02/04/2022 9.7  8.5 - 10.1 mg/dL Final     Glucose 02/04/2022 72  70 - 99 mg/dL Final     Alkaline Phosphatase 02/04/2022 74  40 - 150 U/L Final     AST 02/04/2022 34  0 - 45 U/L Final     ALT 02/04/2022 48  0 - 70 U/L Final     Protein Total 02/04/2022 8.1  6.8 - 8.8 g/dL Final     Albumin 02/04/2022 4.1  3.4 - 5.0 g/dL Final     Bilirubin Total 02/04/2022 0.3  0.2 - 1.3 mg/dL Final     GFR Estimate 02/04/2022 71  >60 mL/min/1.73m2 Final    Effective December 21, 2021 eGFRcr in adults is calculated using the 2021 CKD-EPI creatinine equation which includes age and gender (Agapito et al., NEJ, DOI: 10.1056/TFXUtz6848225)     HIV-1 RNA copies/mL 02/04/2022 Not Detected  Not Detected Copies/mL Final     CD3% Total T Cells 02/04/2022 68  49 - 84 % Final     Absolute CD3, Total T Cells 02/04/2022 1,324  603-2,990 cells/uL Final     CD4% Victoria T Cells 02/04/2022 39  28 - 63 % Final     Absolute CD4, Victoria T Cells 02/04/2022 755  441-2,156 cells/uL Final     CD8% Suppressor T Cells 02/04/2022 25  10 - 40 % Final     Absolute CD8, Suppressor T Cells 02/04/2022 490  125-1,312 cells/uL Final     CD4:CD8 Ratio 02/04/2022 1.54  1.40 - 2.60 Final     WBC Count 02/04/2022 5.5  4.0 - 11.0 10e3/uL Final     RBC Count 02/04/2022 6.28 (A) 4.40 - 5.90 10e6/uL Final     Hemoglobin 02/04/2022 14.5  13.3 - 17.7 g/dL Final     Hematocrit 02/04/2022 47.4  40.0 - 53.0 % Final     MCV 02/04/2022 76 (A) 78 - 100 fL Final     MCH 02/04/2022 23.1 (A) 26.5 - 33.0 pg Final     MCHC 02/04/2022 30.6 (A) 31.5 - 36.5 g/dL Final     RDW 02/04/2022 14.7  10.0 - 15.0 % Final      Platelet Count 02/04/2022 260  150 - 450 10e3/uL Final     % Neutrophils 02/04/2022 56  % Final     % Lymphocytes 02/04/2022 33  % Final     % Monocytes 02/04/2022 10  % Final     % Eosinophils 02/04/2022 1  % Final     % Basophils 02/04/2022 0  % Final     % Immature Granulocytes 02/04/2022 0  % Final     NRBCs per 100 WBC 02/04/2022 0  <1 /100 Final     Absolute Neutrophils 02/04/2022 3.1  1.6 - 8.3 10e3/uL Final     Absolute Lymphocytes 02/04/2022 1.8  0.8 - 5.3 10e3/uL Final     Absolute Monocytes 02/04/2022 0.6  0.0 - 1.3 10e3/uL Final     Absolute Eosinophils 02/04/2022 0.1  0.0 - 0.7 10e3/uL Final     Absolute Basophils 02/04/2022 0.0  0.0 - 0.2 10e3/uL Final     Absolute Immature Granulocytes 02/04/2022 0.0  <=0.4 10e3/uL Final     Absolute NRBCs 02/04/2022 0.0  10e3/uL Final   Allied Health/Nurse Visit on 02/04/2022   Component Date Value Ref Range Status     Chlamydia trachomatis 02/04/2022 Negative  Negative Final    A negative result by transcription mediated amplification does not preclude the presence of C. trachomatis infection because results are dependent on proper and adequate collection, absence of inhibitors and sufficient rRNA to be detected.     Chlamydia trachomatis 02/04/2022 Negative  Negative Final    A negative result by transcription mediated amplification does not preclude the presence of C. trachomatis infection because results are dependent on proper and adequate collection, absence of inhibitors and sufficient rRNA to be detected.     Neisseria gonorrhoeae 02/04/2022 Negative  Negative Final    Negative for N. gonorrhoeae rRNA by transcription mediated amplification. A negative result by transcription mediated amplification does not preclude the presence of C. trachomatis infection because results are dependent on proper and adequate collection, absence of inhibitors and sufficient rRNA to be detected.     Neisseria gonorrhoeae 02/04/2022 Positive (A) Negative Final    Positive  for N. gonorrhoeae rRNA by transcription mediated amplification. As is true for all non-culture methods, a positive specimen obtained from a patient after therapeutic treatment cannot be interpreted as indicating the presence of viable N. gonorrhoeae.     Chlamydia trachomatis 02/04/2022 Negative  Negative Final    A negative result by transcription mediated amplification does not preclude the presence of C. trachomatis infection because results are dependent on proper and adequate collection, absence of inhibitors and sufficient rRNA to be detected.     Neisseria gonorrhoeae 02/04/2022 Negative  Negative Final    Negative for N. gonorrhoeae rRNA by transcription mediated amplification. A negative result by transcription mediated amplification does not preclude the presence of C. trachomatis infection because results are dependent on proper and adequate collection, absence of inhibitors and sufficient rRNA to be detected.     IMPRESSION/PLAN:    1. Stable HIV infection:  He tolerates Triumeq (since 7/18) very well with an absolute CD4+ cell count consistently > 700 (since 8/19) and a persistently undetectable HIV plasma viral load.  His dosing adherence is very good but not perfect -- we discussed strategies for remembering doses on weekend evenings when he is out socially.  He will continue taking Triumeq and return to [facility] for follow-up in a year, or as needed before then.  2. Recent pharyngeal gonorrhea, history of STIs:  He was diagnosed with throat gonorrhea on 2/4/22 and treated with IM ceftriaxone on 2/7/22 and was previously diagnosed with Chlamydia in 11/19 and treated then.  He doubts he has had exposures, but would like to be re-tested today, so three site screening for GC / Chlamydia has been sent and we will repeat his Treponema antibody screen today.  3. Mildly elevated, stable creatinine:  His 2/4/22 creatinine was 138 which is within his baseline 1.3 - 1.5 range.  The low-grade  renal insufficiency predates his 6/18 HIV diagnosis and the etiology is unclear.  We will continue to monitor his creatinine and repeat a urinalysis the next time he has a urine STI screen (was normal on 6/25/18).  4. Healthcare maintenance:  He received Pfizer Covid-19 vaccinations on 3/18/21, 4/8/21, and 10/28/21.  An influenza vaccine was given 10/28/21.  He believes he last had a tetanus booster in 2012 (not recorded in the [facility] registry) so a Tdap was given today.  He received Menactra on 10/2/20.  He received Prevnar 13 4/17/29 and Pneumovax 23 today.  Received a full Guardasil 9 series between 11/18 - 4/19.  He was hepatitis A and B viruses seroimmune on 6/27/18 and hepatitis C antibody non-reactive most recently on 4/17/19.  TB Quantiferon Gold negative on 6/27/18.  CMV seropositive and Toxoplasma seronegative on 6/25/18.  He has not seen a dentist in several years so was encouraged to schedule a routine appointment.  He exercises at a gym 4 - 5 times per week.

## 2022-05-08 ENCOUNTER — HEALTH MAINTENANCE LETTER (OUTPATIENT)
Age: 27
End: 2022-05-08

## 2022-06-06 DIAGNOSIS — Z21 HUMAN IMMUNODEFICIENCY VIRUS I INFECTION (H): ICD-10-CM

## 2022-06-06 RX ORDER — ABACAVIR SULFATE, DOLUTEGRAVIR SODIUM, LAMIVUDINE 600; 50; 300 MG/1; MG/1; MG/1
TABLET, FILM COATED ORAL
Qty: 30 TABLET | Refills: 11 | Status: SHIPPED | OUTPATIENT
Start: 2022-06-06 | End: 2023-06-02

## 2022-07-20 ENCOUNTER — LAB (OUTPATIENT)
Dept: LAB | Facility: CLINIC | Age: 27
End: 2022-07-20
Payer: COMMERCIAL

## 2022-07-20 ENCOUNTER — ALLIED HEALTH/NURSE VISIT (OUTPATIENT)
Dept: INFECTIOUS DISEASES | Facility: CLINIC | Age: 27
End: 2022-07-20
Attending: INTERNAL MEDICINE
Payer: COMMERCIAL

## 2022-07-20 DIAGNOSIS — Z21 HUMAN IMMUNODEFICIENCY VIRUS I INFECTION (H): ICD-10-CM

## 2022-07-20 DIAGNOSIS — R79.89 ELEVATED SERUM CREATININE: ICD-10-CM

## 2022-07-20 DIAGNOSIS — Z00.00 HEALTHCARE MAINTENANCE: Primary | ICD-10-CM

## 2022-07-20 LAB
ALBUMIN SERPL-MCNC: 4 G/DL (ref 3.4–5)
ALBUMIN UR-MCNC: NEGATIVE MG/DL
ALP SERPL-CCNC: 65 U/L (ref 40–150)
ALT SERPL W P-5'-P-CCNC: 36 U/L (ref 0–70)
ANION GAP SERPL CALCULATED.3IONS-SCNC: 6 MMOL/L (ref 3–14)
APPEARANCE UR: CLEAR
AST SERPL W P-5'-P-CCNC: 30 U/L (ref 0–45)
BASOPHILS # BLD AUTO: 0 10E3/UL (ref 0–0.2)
BASOPHILS NFR BLD AUTO: 1 %
BILIRUB SERPL-MCNC: 0.3 MG/DL (ref 0.2–1.3)
BILIRUB UR QL STRIP: NEGATIVE
BUN SERPL-MCNC: 19 MG/DL (ref 7–30)
CALCIUM SERPL-MCNC: 9.7 MG/DL (ref 8.5–10.1)
CD3 CELLS # BLD: 1475 CELLS/UL (ref 603–2990)
CD3 CELLS NFR BLD: 65 % (ref 49–84)
CD3+CD4+ CELLS # BLD: 763 CELLS/UL (ref 441–2156)
CD3+CD4+ CELLS NFR BLD: 34 % (ref 28–63)
CD3+CD4+ CELLS/CD3+CD8+ CLL BLD: 1.23 % (ref 1.4–2.6)
CD3+CD8+ CELLS # BLD: 618 CELLS/UL (ref 125–1312)
CD3+CD8+ CELLS NFR BLD: 27 % (ref 10–40)
CHLORIDE BLD-SCNC: 109 MMOL/L (ref 94–109)
CO2 SERPL-SCNC: 26 MMOL/L (ref 20–32)
COLOR UR AUTO: YELLOW
CREAT SERPL-MCNC: 1.37 MG/DL (ref 0.66–1.25)
EOSINOPHIL # BLD AUTO: 0.1 10E3/UL (ref 0–0.7)
EOSINOPHIL NFR BLD AUTO: 1 %
ERYTHROCYTE [DISTWIDTH] IN BLOOD BY AUTOMATED COUNT: 14.6 % (ref 10–15)
GFR SERPL CREATININE-BSD FRML MDRD: 72 ML/MIN/1.73M2
GLUCOSE BLD-MCNC: 87 MG/DL (ref 70–99)
GLUCOSE UR STRIP-MCNC: NEGATIVE MG/DL
HCT VFR BLD AUTO: 44.7 % (ref 40–53)
HGB BLD-MCNC: 13.9 G/DL (ref 13.3–17.7)
HGB UR QL STRIP: NEGATIVE
IMM GRANULOCYTES # BLD: 0 10E3/UL
IMM GRANULOCYTES NFR BLD: 1 %
KETONES UR STRIP-MCNC: NEGATIVE MG/DL
LEUKOCYTE ESTERASE UR QL STRIP: NEGATIVE
LIPASE SERPL-CCNC: 139 U/L (ref 73–393)
LYMPHOCYTES # BLD AUTO: 2.1 10E3/UL (ref 0.8–5.3)
LYMPHOCYTES NFR BLD AUTO: 42 %
MCH RBC QN AUTO: 23.6 PG (ref 26.5–33)
MCHC RBC AUTO-ENTMCNC: 31.1 G/DL (ref 31.5–36.5)
MCV RBC AUTO: 76 FL (ref 78–100)
MONOCYTES # BLD AUTO: 0.5 10E3/UL (ref 0–1.3)
MONOCYTES NFR BLD AUTO: 11 %
MUCOUS THREADS #/AREA URNS LPF: PRESENT /LPF
NEUTROPHILS # BLD AUTO: 2.3 10E3/UL (ref 1.6–8.3)
NEUTROPHILS NFR BLD AUTO: 44 %
NITRATE UR QL: NEGATIVE
NRBC # BLD AUTO: 0 10E3/UL
NRBC BLD AUTO-RTO: 0 /100
PH UR STRIP: 6.5 [PH] (ref 5–7)
PLATELET # BLD AUTO: 303 10E3/UL (ref 150–450)
POTASSIUM BLD-SCNC: 4.1 MMOL/L (ref 3.4–5.3)
PROT SERPL-MCNC: 7.9 G/DL (ref 6.8–8.8)
RBC # BLD AUTO: 5.88 10E6/UL (ref 4.4–5.9)
RBC URINE: 0 /HPF
SODIUM SERPL-SCNC: 141 MMOL/L (ref 133–144)
SP GR UR STRIP: 1.02 (ref 1–1.03)
T CELL COMMENT: ABNORMAL
T PALLIDUM AB SER QL: NONREACTIVE
UROBILINOGEN UR STRIP-MCNC: NORMAL MG/DL
WBC # BLD AUTO: 5 10E3/UL (ref 4–11)
WBC URINE: 0 /HPF

## 2022-07-20 PROCEDURE — 87591 N.GONORRHOEAE DNA AMP PROB: CPT

## 2022-07-20 PROCEDURE — 86780 TREPONEMA PALLIDUM: CPT | Mod: 90 | Performed by: PATHOLOGY

## 2022-07-20 PROCEDURE — 87591 N.GONORRHOEAE DNA AMP PROB: CPT | Mod: 90 | Performed by: PATHOLOGY

## 2022-07-20 PROCEDURE — 80053 COMPREHEN METABOLIC PANEL: CPT | Performed by: PATHOLOGY

## 2022-07-20 PROCEDURE — 36415 COLL VENOUS BLD VENIPUNCTURE: CPT | Performed by: PATHOLOGY

## 2022-07-20 PROCEDURE — 87536 HIV-1 QUANT&REVRSE TRNSCRPJ: CPT | Mod: 90 | Performed by: PATHOLOGY

## 2022-07-20 PROCEDURE — 81001 URINALYSIS AUTO W/SCOPE: CPT | Performed by: PATHOLOGY

## 2022-07-20 PROCEDURE — 86360 T CELL ABSOLUTE COUNT/RATIO: CPT | Mod: 90 | Performed by: PATHOLOGY

## 2022-07-20 PROCEDURE — 83690 ASSAY OF LIPASE: CPT | Performed by: PATHOLOGY

## 2022-07-20 PROCEDURE — 86359 T CELLS TOTAL COUNT: CPT | Mod: 90 | Performed by: PATHOLOGY

## 2022-07-20 PROCEDURE — 87491 CHLMYD TRACH DNA AMP PROBE: CPT | Mod: 90 | Performed by: PATHOLOGY

## 2022-07-20 PROCEDURE — 87491 CHLMYD TRACH DNA AMP PROBE: CPT

## 2022-07-20 PROCEDURE — 85025 COMPLETE CBC W/AUTO DIFF WBC: CPT | Performed by: PATHOLOGY

## 2022-07-20 PROCEDURE — 99000 SPECIMEN HANDLING OFFICE-LAB: CPT | Performed by: PATHOLOGY

## 2022-07-21 LAB
C TRACH DNA SPEC QL NAA+PROBE: NEGATIVE
N GONORRHOEA DNA SPEC QL NAA+PROBE: NEGATIVE

## 2022-07-22 LAB — HIV1 RNA # PLAS NAA DL=20: NOT DETECTED COPIES/ML

## 2022-08-01 ENCOUNTER — APPOINTMENT (OUTPATIENT)
Dept: CT IMAGING | Facility: CLINIC | Age: 27
End: 2022-08-01
Attending: EMERGENCY MEDICINE
Payer: COMMERCIAL

## 2022-08-01 ENCOUNTER — TELEPHONE (OUTPATIENT)
Dept: OTOLARYNGOLOGY | Facility: CLINIC | Age: 27
End: 2022-08-01

## 2022-08-01 ENCOUNTER — HOSPITAL ENCOUNTER (EMERGENCY)
Facility: CLINIC | Age: 27
Discharge: HOME | End: 2022-08-01
Attending: EMERGENCY MEDICINE | Admitting: EMERGENCY MEDICINE
Payer: COMMERCIAL

## 2022-08-01 VITALS
SYSTOLIC BLOOD PRESSURE: 129 MMHG | RESPIRATION RATE: 18 BRPM | BODY MASS INDEX: 30.06 KG/M2 | DIASTOLIC BLOOD PRESSURE: 82 MMHG | HEIGHT: 70 IN | HEART RATE: 74 BPM | TEMPERATURE: 98.9 F | OXYGEN SATURATION: 99 % | WEIGHT: 210 LBS

## 2022-08-01 DIAGNOSIS — J36 PERITONSILLAR ABSCESS: ICD-10-CM

## 2022-08-01 DIAGNOSIS — Z76.89 ENCOUNTER FOR INCISION AND DRAINAGE PROCEDURE: ICD-10-CM

## 2022-08-01 DIAGNOSIS — Z20.822 COVID-19 RULED OUT: ICD-10-CM

## 2022-08-01 LAB
ALBUMIN SERPL BCG-MCNC: 4.2 G/DL (ref 3.5–5.2)
ALP SERPL-CCNC: 76 U/L (ref 40–129)
ALT SERPL W P-5'-P-CCNC: 34 U/L (ref 10–50)
ANION GAP SERPL CALCULATED.3IONS-SCNC: 10 MMOL/L (ref 7–15)
AST SERPL W P-5'-P-CCNC: 34 U/L (ref 10–50)
BASOPHILS # BLD AUTO: 0 10E3/UL (ref 0–0.2)
BASOPHILS NFR BLD AUTO: 0 %
BILIRUB SERPL-MCNC: 0.4 MG/DL
BUN SERPL-MCNC: 15.8 MG/DL (ref 6–20)
CALCIUM SERPL-MCNC: 9 MG/DL (ref 8.6–10)
CHLORIDE SERPL-SCNC: 104 MMOL/L (ref 98–107)
CREAT SERPL-MCNC: 1.33 MG/DL (ref 0.67–1.17)
DEPRECATED HCO3 PLAS-SCNC: 25 MMOL/L (ref 22–29)
EOSINOPHIL # BLD AUTO: 0.1 10E3/UL (ref 0–0.7)
EOSINOPHIL NFR BLD AUTO: 2 %
ERYTHROCYTE [DISTWIDTH] IN BLOOD BY AUTOMATED COUNT: 14.2 % (ref 10–15)
GFR SERPL CREATININE-BSD FRML MDRD: 75 ML/MIN/1.73M2
GLUCOSE SERPL-MCNC: 99 MG/DL (ref 70–99)
GROUP A STREP BY PCR: DETECTED
HCT VFR BLD AUTO: 41.8 % (ref 40–53)
HGB BLD-MCNC: 13 G/DL (ref 13.3–17.7)
IMM GRANULOCYTES # BLD: 0.1 10E3/UL
IMM GRANULOCYTES NFR BLD: 1 %
LYMPHOCYTES # BLD AUTO: 1.7 10E3/UL (ref 0.8–5.3)
LYMPHOCYTES NFR BLD AUTO: 17 %
MCH RBC QN AUTO: 23.9 PG (ref 26.5–33)
MCHC RBC AUTO-ENTMCNC: 31.1 G/DL (ref 31.5–36.5)
MCV RBC AUTO: 77 FL (ref 78–100)
MONOCYTES # BLD AUTO: 1.2 10E3/UL (ref 0–1.3)
MONOCYTES NFR BLD AUTO: 13 %
NEUTROPHILS # BLD AUTO: 6.4 10E3/UL (ref 1.6–8.3)
NEUTROPHILS NFR BLD AUTO: 67 %
NRBC # BLD AUTO: 0 10E3/UL
NRBC BLD AUTO-RTO: 0 /100
PLATELET # BLD AUTO: 258 10E3/UL (ref 150–450)
POTASSIUM SERPL-SCNC: 3.7 MMOL/L (ref 3.4–5.3)
PROT SERPL-MCNC: 7.4 G/DL (ref 6.4–8.3)
RBC # BLD AUTO: 5.44 10E6/UL (ref 4.4–5.9)
SARS-COV-2 RNA RESP QL NAA+PROBE: NEGATIVE
SODIUM SERPL-SCNC: 139 MMOL/L (ref 136–145)
WBC # BLD AUTO: 9.6 10E3/UL (ref 4–11)

## 2022-08-01 PROCEDURE — C9803 HOPD COVID-19 SPEC COLLECT: HCPCS | Performed by: EMERGENCY MEDICINE

## 2022-08-01 PROCEDURE — 99283 EMERGENCY DEPT VISIT LOW MDM: CPT | Mod: 25 | Performed by: PHYSICIAN ASSISTANT

## 2022-08-01 PROCEDURE — 85025 COMPLETE CBC W/AUTO DIFF WBC: CPT | Performed by: EMERGENCY MEDICINE

## 2022-08-01 PROCEDURE — 87070 CULTURE OTHR SPECIMN AEROBIC: CPT | Performed by: PHYSICIAN ASSISTANT

## 2022-08-01 PROCEDURE — 80053 COMPREHEN METABOLIC PANEL: CPT | Performed by: EMERGENCY MEDICINE

## 2022-08-01 PROCEDURE — 82040 ASSAY OF SERUM ALBUMIN: CPT | Performed by: EMERGENCY MEDICINE

## 2022-08-01 PROCEDURE — 250N000011 HC RX IP 250 OP 636: Performed by: EMERGENCY MEDICINE

## 2022-08-01 PROCEDURE — 87075 CULTR BACTERIA EXCEPT BLOOD: CPT | Performed by: PHYSICIAN ASSISTANT

## 2022-08-01 PROCEDURE — U0005 INFEC AGEN DETEC AMPLI PROBE: HCPCS | Performed by: EMERGENCY MEDICINE

## 2022-08-01 PROCEDURE — 70491 CT SOFT TISSUE NECK W/DYE: CPT | Mod: 26 | Performed by: RADIOLOGY

## 2022-08-01 PROCEDURE — 70491 CT SOFT TISSUE NECK W/DYE: CPT

## 2022-08-01 PROCEDURE — 99285 EMERGENCY DEPT VISIT HI MDM: CPT | Mod: CS | Performed by: EMERGENCY MEDICINE

## 2022-08-01 PROCEDURE — 99285 EMERGENCY DEPT VISIT HI MDM: CPT | Mod: CS,25 | Performed by: EMERGENCY MEDICINE

## 2022-08-01 PROCEDURE — 42700 I&D ABSCESS PERITONSILLAR: CPT | Performed by: EMERGENCY MEDICINE

## 2022-08-01 PROCEDURE — 96365 THER/PROPH/DIAG IV INF INIT: CPT | Mod: 59 | Performed by: EMERGENCY MEDICINE

## 2022-08-01 PROCEDURE — 42700 I&D ABSCESS PERITONSILLAR: CPT | Performed by: PHYSICIAN ASSISTANT

## 2022-08-01 PROCEDURE — 87651 STREP A DNA AMP PROBE: CPT | Performed by: EMERGENCY MEDICINE

## 2022-08-01 PROCEDURE — 96375 TX/PRO/DX INJ NEW DRUG ADDON: CPT | Mod: 59 | Performed by: EMERGENCY MEDICINE

## 2022-08-01 PROCEDURE — 36415 COLL VENOUS BLD VENIPUNCTURE: CPT | Performed by: EMERGENCY MEDICINE

## 2022-08-01 RX ORDER — AMOXICILLIN AND CLAVULANATE POTASSIUM 400; 57 MG/5ML; MG/5ML
875 POWDER, FOR SUSPENSION ORAL 2 TIMES DAILY
Qty: 218 ML | Refills: 0 | Status: SHIPPED | OUTPATIENT
Start: 2022-08-01 | End: 2022-08-11

## 2022-08-01 RX ORDER — IOPAMIDOL 755 MG/ML
100 INJECTION, SOLUTION INTRAVASCULAR ONCE
Status: COMPLETED | OUTPATIENT
Start: 2022-08-01 | End: 2022-08-01

## 2022-08-01 RX ORDER — LIDOCAINE HYDROCHLORIDE AND EPINEPHRINE 10; 10 MG/ML; UG/ML
4 INJECTION, SOLUTION INFILTRATION; PERINEURAL ONCE
Status: DISCONTINUED | OUTPATIENT
Start: 2022-08-01 | End: 2022-08-01 | Stop reason: HOSPADM

## 2022-08-01 RX ORDER — AMPICILLIN AND SULBACTAM 2; 1 G/1; G/1
3 INJECTION, POWDER, FOR SOLUTION INTRAMUSCULAR; INTRAVENOUS ONCE
Status: COMPLETED | OUTPATIENT
Start: 2022-08-01 | End: 2022-08-01

## 2022-08-01 RX ORDER — DEXAMETHASONE SODIUM PHOSPHATE 10 MG/ML
10 INJECTION, SOLUTION INTRAMUSCULAR; INTRAVENOUS ONCE
Status: COMPLETED | OUTPATIENT
Start: 2022-08-01 | End: 2022-08-01

## 2022-08-01 RX ORDER — KETOROLAC TROMETHAMINE 15 MG/ML
15 INJECTION, SOLUTION INTRAMUSCULAR; INTRAVENOUS ONCE
Status: COMPLETED | OUTPATIENT
Start: 2022-08-01 | End: 2022-08-01

## 2022-08-01 RX ADMIN — DEXAMETHASONE SODIUM PHOSPHATE 10 MG: 10 INJECTION, SOLUTION INTRAMUSCULAR; INTRAVENOUS at 06:27

## 2022-08-01 RX ADMIN — AMPICILLIN SODIUM AND SULBACTAM SODIUM 3 G: 2; 1 INJECTION, POWDER, FOR SOLUTION INTRAMUSCULAR; INTRAVENOUS at 06:27

## 2022-08-01 RX ADMIN — KETOROLAC TROMETHAMINE 15 MG: 15 INJECTION, SOLUTION INTRAMUSCULAR; INTRAVENOUS at 04:11

## 2022-08-01 RX ADMIN — IOPAMIDOL 100 ML: 755 INJECTION, SOLUTION INTRAVENOUS at 05:29

## 2022-08-01 NOTE — LETTER
August 1, 2022      To Whom It May Concern:      Iker Berrios was seen in our Emergency Department today, 08/01/22.  I expect his condition to improve over the next few days.  He may return to school when improved.    Sincerely,        Zachary Llamas MD

## 2022-08-01 NOTE — ED TRIAGE NOTES
Triage Assessment     Row Name 08/01/22 0246       Triage Assessment (Adult)    Airway WDL WDL       Respiratory WDL    Respiratory WDL WDL       Skin Circulation/Temperature WDL    Skin Circulation/Temperature WDL WDL       Cardiac WDL    Cardiac WDL WDL       Peripheral/Neurovascular WDL    Peripheral Neurovascular WDL WDL       Cognitive/Neuro/Behavioral WDL    Cognitive/Neuro/Behavioral WDL WDL

## 2022-08-01 NOTE — ED PROVIDER NOTES
Patient seen by Dr. Díaz initially patient with right-sided peritonsillar abscess seen by ENT who did drain it here patient observed for several hours after bleeding is controlled this point.  Patient doing well able to swallow airway intact. Bleeding controlled without trismus.  Recommendations per ENT were to treat with 10 days of Augmentin as he has no allergies we will do liquid also also some Lortab elixir for pain control.  To follow-up in the next few months with ENT regarding potential tonsillectomy as he had 2 peritonsillar abscess.  Note for school also given.  Patient to be discharged return if problems at all.      This note was created at least in part by the use of dragon voice dictation system. Inadvertent typographical errors may still exist.  Zachary Llamas MD.    Patient evaluated in the emergency department during the COVID-19 pandemic period. Careful attention to patients safety was addressed throughout the evaluation. Evaluation and treatment management was initiated with disposition made efficiently and appropriate as possible to minimize any risk of potential exposure to patient during this evaluation.       Zachary Llamas MD  08/01/22 3893

## 2022-08-01 NOTE — TELEPHONE ENCOUNTER
[facility] Call Center    Phone Message    May a detailed message be left on voicemail: no     Reason for Call: Appointment Intake    Referring Provider Name: Zachary Llamas MD  Diagnosis and/or Symptoms: Peritonsillar abscess  Encounter for incision and drainage procedure    recurrent PTA ? follow up for eval for potential tonsillectomy      Sending to clinic per protocols  Action Taken: Other: ENT    Travel Screening: Not Applicable

## 2022-08-01 NOTE — ED TRIAGE NOTES
Pt arrives to ED with c/o swollen lymph nodes with difficulty breathing and ear pain. Pt states that he thinks he has an ear infection.

## 2022-08-01 NOTE — ED PROVIDER NOTES
ED Provider Note  [facility]      History     Chief Complaint   Patient presents with     Otalgia     Pharyngitis     HPI  Iker Berrios is a 26 year old male who has past medical history of HIV presenting with sore throat for 2 to 3 days.  No fevers but has felt achy.  No cough is had some shortness of breath due to swelling in his neck.  Denies body aches or fevers.  Has some mild right ear pain with no change in hearing or rashes noted.  No headache or neck stiffness.  Trouble swallowing due to pain but no drooling.  Denies vomiting or diarrhea.  Tried some Tylenol with some relief.  Pain does worsen when he lies down.  He thinks his voice is changes well.    Last CD4 763 7/20/22    Past Medical History  Past Medical History:   Diagnosis Date     Human immunodeficiency virus I infection (H) 06/19/2018    Likely infected 3/18.  On antiretroviral therapy with Triumeq since 7/9/18.  Pre-treatment HIV viral load 2,654 copies/ml.  Scott CD4+ cell count 763.     Past Surgical History:   Procedure Laterality Date     none       TRIUMEQ 600- MG per tablet      No Known Allergies  Family History  Family History   Problem Relation Age of Onset     Diabetes Maternal Grandmother      Asthma No family hx of      C.A.D. No family hx of      Hypertension No family hx of      Cerebrovascular Disease No family hx of      Cancer No family hx of      Social History   Social History     Tobacco Use     Smoking status: Never Smoker     Smokeless tobacco: Never Used   Substance Use Topics     Alcohol use: Yes     Drug use: No      Past medical history, past surgical history, medications, allergies, family history, and social history were reviewed with the patient. No additional pertinent items.       Review of Systems  A complete review of systems was performed with pertinent positives and negatives noted in the HPI, and all other systems negative.    Physical Exam   BP: 125/67  Pulse: 78  Temp:  "98.9  F (37.2  C)  Resp: 16  Height: 177.8 cm (5' 10\")  Weight: 95.3 kg (210 lb)  SpO2: 98 %  Physical Exam  Physical Exam   Constitutional: oriented to person, place, and time. appears well-developed and well-nourished.   HENT:   Head: Normocephalic and atraumatic. tympanic membrane's clear bilaterally.  No lesions, rashes, vesicles.  No submandibular swelling erythema, no submandibular tenderness.  Tongue is not elevated.  Floor mouth soft.  Posterior pharynx with bilateral pharyngeal swelling, mild exudates.  Neck: Normal range of motion. no nuchal rigidity.  No stridor.  Lymphadenopathy of the upper cervical change in the right side.  Pulmonary/Chest: Effort normal. No respiratory distress.   Cardiac: No murmurs, rubs, gallops. RRR.  Abdominal: Abdomen soft, nontender, nondistended. No rebound tenderness.  MSK: Long bones without deformity or evidence of trauma  Neurological: alert and oriented to person, place, and time.   Skin: Skin is warm and dry. No rashes.  Psychiatric:  normal mood and affect.  behavior is normal. Thought content normal.     ED Course      Procedures       Results for orders placed or performed during the hospital encounter of 08/01/22   Soft tissue neck CT w contrast     Status: None (Preliminary result)    Narrative    EXAM: CT SOFT TISSUE NECK W CONTRAST  LOCATION: [facility]  DATE/TIME: 8/1/2022 5:25 AM    INDICATION: Epiglottitis.  COMPARISON: None.  CONTRAST: Iopamidol (ISOVUE 370) solution 100 mL.  TECHNIQUE: Routine CT Soft Tissue Neck with IV contrast. Multiplanar reformats. Dose reduction techniques were used.    FINDINGS:     MUCOSAL SPACES/SOFT TISSUES: There is heterogeneous enhancement and enlargement of the bilateral palatine tonsils, right greater than left. Within the lateral and inferior margins of the right palatine tonsil, there is an irregular and loculated   rim-enhancing fluid collection measuring approximately 6 mm in " transverse dimensions, 5 mm in AP dimensions and approximately 2.4 cm in craniocaudal dimensions. Mild adjacent edema is noted tracking into the nasopharynx superiorly and into the right   parapharyngeal fat plane laterally. A small amount of adjacent prevertebral edema is noted on the right. There is moderate effacement of the oropharyngeal airway, however the hypopharyngeal airway is patent. The epiglottis is unremarkable. The vocal   apparatus is unremarkable. Normal subcutaneous soft tissues. Normal oral cavity,  spaces, and floor of mouth structures.    LYMPH NODES: Increased in number and prominent in size multistation cervical lymph nodes, right greater than left. No necrotic or suppurative lymph nodes identified. These are likely reactive.     SALIVARY GLANDS: Normal bilateral parotid glands. There is mild asymmetric enlargement of the right submandibular gland with associated increased, heterogeneous enhancement. Question small volume fluid within the right submandibular space.    THYROID: Normal.     VESSELS: Vascular structures of the neck are patent.    VISUALIZED INTRACRANIAL/ORBITS/SINUSES: No abnormality of the visualized intracranial compartment or orbits. Visualized paranasal sinuses and mastoid air cells are clear.    OTHER: No destructive osseous lesion. The included lung apices are clear.      Impression    IMPRESSION:   1.  Acute bilateral palatine tonsillitis, right greater than left. There is moderate associated effacement of the oropharyngeal airway.  2.  Loculated right peritonsillar abscess measuring up to 0.5 cm in AP dimensions, 0.6 cm in transverse dimensions and 2.4 cm in craniocaudal dimensions.  3.  Asymmetric enlargement and heterogeneous enhancement of the right submandibular gland, concerning for superimposed acute right-sided submandibular gland sialoadenitis, possibly reactive.  4.  Additional findings above.                   Symptomatic; Unknown COVID-19 Virus  (Coronavirus) by PCR Nasopharyngeal     Status: Normal    Specimen: Nasopharyngeal; Swab   Result Value Ref Range    SARS CoV2 PCR Negative Negative, Testing sent to reference lab. Results will be returned via unsolicited result    Narrative    Testing was performed using the Xpert Xpress SARS-CoV-2 Assay on the  Cepheid Gene-Xpert Instrument Systems. Additional information about  this Emergency Use Authorization (EUA) assay can be found via the Lab  Guide. This test should be ordered for the detection of SARS-CoV-2 in  individuals who meet SARS-CoV-2 clinical and/or epidemiological  criteria. Test performance is unknown in asymptomatic patients. This  test is for in vitro diagnostic use under the FDA EUA for  laboratories certified under CLIA to perform high complexity testing.  This test has not been FDA cleared or approved. A negative result  does not rule out the presence of PCR inhibitors in the specimen or  target RNA in concentration below the limit of detection for the  assay. The possibility of a false negative should be considered if  the patient's recent exposure or clinical presentation suggests  COVID-19. This test was validated by the [facility]. This laboratory is certified under  the Clinical Laboratory Improvement Amendments of 1988 (CLIA-88) as  qualified to perform high complexity laboratory testing.     Comprehensive metabolic panel     Status: Abnormal   Result Value Ref Range    Sodium 139 136 - 145 mmol/L    Potassium 3.7 3.4 - 5.3 mmol/L    Creatinine 1.33 (H) 0.67 - 1.17 mg/dL    Urea Nitrogen 15.8 6.0 - 20.0 mg/dL    Chloride 104 98 - 107 mmol/L    Carbon Dioxide (CO2) 25 22 - 29 mmol/L    Anion Gap 10 7 - 15 mmol/L    Glucose 99 70 - 99 mg/dL    Calcium 9.0 8.6 - 10.0 mg/dL    Protein Total 7.4 6.4 - 8.3 g/dL    Albumin 4.2 3.5 - 5.2 g/dL    Bilirubin Total 0.4 <=1.2 mg/dL    Alkaline Phosphatase 76 40 - 129 U/L    AST 34 10 - 50 U/L    ALT  34 10 - 50 U/L    GFR Estimate 75 >60 mL/min/1.73m2   CBC with platelets and differential     Status: Abnormal   Result Value Ref Range    WBC Count 9.6 4.0 - 11.0 10e3/uL    RBC Count 5.44 4.40 - 5.90 10e6/uL    Hemoglobin 13.0 (L) 13.3 - 17.7 g/dL    Hematocrit 41.8 40.0 - 53.0 %    MCV 77 (L) 78 - 100 fL    MCH 23.9 (L) 26.5 - 33.0 pg    MCHC 31.1 (L) 31.5 - 36.5 g/dL    RDW 14.2 10.0 - 15.0 %    Platelet Count 258 150 - 450 10e3/uL    % Neutrophils 67 %    % Lymphocytes 17 %    % Monocytes 13 %    % Eosinophils 2 %    % Basophils 0 %    % Immature Granulocytes 1 %    NRBCs per 100 WBC 0 <1 /100    Absolute Neutrophils 6.4 1.6 - 8.3 10e3/uL    Absolute Lymphocytes 1.7 0.8 - 5.3 10e3/uL    Absolute Monocytes 1.2 0.0 - 1.3 10e3/uL    Absolute Eosinophils 0.1 0.0 - 0.7 10e3/uL    Absolute Basophils 0.0 0.0 - 0.2 10e3/uL    Absolute Immature Granulocytes 0.1 <=0.4 10e3/uL    Absolute NRBCs 0.0 10e3/uL   Group A Streptococcus PCR Throat Swab     Status: Abnormal    Specimen: Throat; Swab   Result Value Ref Range    Group A strep by PCR Detected (A) Not Detected    Narrative    The Xpert Xpress Strep A test, performed on the [employer], is a rapid, qualitative in vitro diagnostic test for the detection of Streptococcus pyogenes (Group A ß-hemolytic Streptococcus, Strep A) in throat swab specimens from patients with signs and symptoms of pharyngitis. The Xpert Xpress Strep A test can be used as an aid in the diagnosis of Group A Streptococcal pharyngitis. The assay is not intended to monitor treatment for Group A Streptococcus infections. The Xpert Xpress Strep A test utilizes an automated real-time polymerase chain reaction (PCR) to detect Streptococcus pyogenes DNA.   CBC with platelets differential     Status: Abnormal    Narrative    The following orders were created for panel order CBC with platelets differential.  Procedure                               Abnormality         Status                      ---------                               -----------         ------                     CBC with platelets and d...[[id number]]  Abnormal            Final result                 Please view results for these tests on the individual orders.          No results found for any visits on 08/01/22.  Medications   ketorolac (TORADOL) injection 15 mg (has no administration in time range)        Assessments & Plan (with Medical Decision Making)   MDM  Patient presenting with throat pain found to have peritonsillar abscess.  He is protecting his airway at this point without airway compromise.  It is loculated does not seem to be superficial on exam or CT scan.  ENT to see the patient.  Antibiotics and steroids given.  Otherwise appears well and afebrile.  No symptoms or signs of milky pox in this patient, he has no known exposures but is at high risk.    I have reviewed the nursing notes. I have reviewed the findings, diagnosis, plan and need for follow up with the patient.    New Prescriptions    AMOXICILLIN-CLAVULANATE (AUGMENTIN) 875-125 MG TABLET    Take 1 tablet by mouth 2 times daily for 7 days       Final diagnoses:   Peritonsillar abscess       --  Teja Díaz  [facility] EMERGENCY DEPARTMENT  8/1/2022     Teja Díaz MD  08/01/22 0622

## 2022-08-01 NOTE — DISCHARGE INSTRUCTIONS
Home.  Encourage cold liquids today.  Take tylenol OR lortab elixir for pain as needed.  Take the liquid augmentin for infection.  Follow up with [facility] N[facility] at [facility] in next few months to discuss possible tonsillectomy.  Return if bleeding or other concerns.    Please make an appointment to follow up with [facility] (phone: [phone]) in 1-2 months

## 2022-08-01 NOTE — CONSULTS
Otolaryngology Consult Note  August 1, 2022      CC: sore throat    HPI: Iker Berrios is a 26 year old male with a past medical history of well controlled HIV infection and PTA in 2019 requiring I&D who presents today with sore throat of three days and was found to have a right peritonsillar abscess on CT. He feels that his symptoms have acutely worsened over the last day. He has not had increased work of breathing or noisy breathing. He has had voice changes. He does not have difficulty opening his mouth. He has not taken antibiotics for this infection. In 2019 he had a right PTA that he underwent I&D for. No other issues with his tonsils.    Past Medical History:   Diagnosis Date     Human immunodeficiency virus I infection (H) 06/19/2018    Likely infected 3/18.  On antiretroviral therapy with Triumeq since 7/9/18.  Pre-treatment HIV viral load 2,654 copies/ml.  Scott CD4+ cell count 763.       Past Surgical History:   Procedure Laterality Date     none         Current Outpatient Medications   Medication Sig Dispense Refill     amoxicillin-clavulanate (AUGMENTIN) 875-125 MG tablet Take 1 tablet by mouth 2 times daily for 7 days 14 tablet 0     TRIUMEQ 600- MG per tablet TAKE 1 TABLET BY MOUTH EVERY DAY 30 tablet 11        No Known Allergies    Social History     Socioeconomic History     Marital status: Single     Spouse name: Not on file     Number of children: Not on file     Years of education: Not on file     Highest education level: Not on file   Occupational History     Occupation: Student   Tobacco Use     Smoking status: Never Smoker     Smokeless tobacco: Never Used   Substance and Sexual Activity     Alcohol use: Yes     Drug use: No     Sexual activity: Yes     Partners: Male     Birth control/protection: Condom     Comment: Use of condoms 50% of the time    Other Topics Concern     Parent/sibling w/ CABG, MI or angioplasty before 65F 55M? Not Asked   Social History Narrative    1/16/19:   "Denies smoking, alcohol, illicit drugs.  Sexually active with men only, both insertive and receptive and inconsistent condom use. 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 Deployed to [employer] for three years.  Brother recently .           Social Determinants of Health     Financial Resource Strain: Not on file   Food Insecurity: Not on file   Transportation Needs: Not on file   Physical Activity: Not on file   Stress: Not on file   Social Connections: Not on file   Intimate Partner Violence: Not on file   Housing Stability: Not on file       Family History   Problem Relation Age of Onset     Diabetes Maternal Grandmother      Asthma No family hx of      C.A.D. No family hx of      Hypertension No family hx of      Cerebrovascular Disease No family hx of      Cancer No family hx of        ROS: 12 point review of systems is negative unless noted in HPI.    PHYSICAL EXAM:  /76   Pulse 66   Temp 98.9  F (37.2  C) (Oral)   Resp 18   Ht 1.778 m (5' 10\")   Wt 95.3 kg (210 lb)   SpO2 99%   BMI 30.13 kg/m    General: laying in bed, no acute distress  HEAD: normocephalic, atraumatic  Face: symmetrical, CN VII intact bilaterally (HB 1), no swelling, edema, or erythema. Sensation V1-V3 intact and equal bilaterally.   Eyes: EOMI, clear sclera  Mouth: moist, no ulcers, no jaw or tooth tenderness, tongue midline and symmetric  Oropharynx: right soft palate bulging and edematous, tonsils enlarged R>L, uvula edematous and displaced to the left  Neck: moderate LAD, trachea midline  Neuro: cranial nerves 2-12 grossly intact  Respiratory: breathing non-labored on RA, no stridor  Skin: no rashes or skin lesions of the face/neck  Psych: pleasant affect    ROUTINE IP LABS (Last four results)  BMP  Recent Labs   Lab 22  0406      POTASSIUM 3.7   CHLORIDE 104   MARIELY 9.0   CO2 25   BUN 15.8   CR 1.33*   GLC 99     CBC  Recent Labs   Lab " 08/01/22  0406   WBC 9.6   RBC 5.44   HGB 13.0*   HCT 41.8   MCV 77*   MCH 23.9*   MCHC 31.1*   RDW 14.2        INRNo lab results found in last 7 days.    Imaging:  Results for orders placed or performed during the hospital encounter of 08/01/22   Soft tissue neck CT w contrast    Narrative    EXAM: CT SOFT TISSUE NECK W CONTRAST  LOCATION: [facility]  DATE/TIME: 8/1/2022 5:25 AM    INDICATION: Epiglottitis.  COMPARISON: None.  CONTRAST: Iopamidol (ISOVUE 370) solution 100 mL.  TECHNIQUE: Routine CT Soft Tissue Neck with IV contrast. Multiplanar reformats. Dose reduction techniques were used.    FINDINGS:     MUCOSAL SPACES/SOFT TISSUES: There is heterogeneous enhancement and enlargement of the bilateral palatine tonsils, right greater than left. Within the lateral and inferior margins of the right palatine tonsil, there is an irregular and loculated   rim-enhancing fluid collection measuring approximately 6 mm in transverse dimensions, 5 mm in AP dimensions and approximately 2.4 cm in craniocaudal dimensions. Mild adjacent edema is noted tracking into the nasopharynx superiorly and into the right   parapharyngeal fat plane laterally. A small amount of adjacent prevertebral edema is noted on the right. There is moderate effacement of the oropharyngeal airway, however the hypopharyngeal airway is patent. The epiglottis is unremarkable. The vocal   apparatus is unremarkable. Normal subcutaneous soft tissues. Normal oral cavity,  spaces, and floor of mouth structures.    LYMPH NODES: Increased in number and prominent in size multistation cervical lymph nodes, right greater than left. No necrotic or suppurative lymph nodes identified. These are likely reactive.     SALIVARY GLANDS: Normal bilateral parotid glands. There is mild asymmetric enlargement of the right submandibular gland with associated increased, heterogeneous enhancement. Question small volume  fluid within the right submandibular space.    THYROID: Normal.     VESSELS: Vascular structures of the neck are patent.    VISUALIZED INTRACRANIAL/ORBITS/SINUSES: No abnormality of the visualized intracranial compartment or orbits. Visualized paranasal sinuses and mastoid air cells are clear.    OTHER: No destructive osseous lesion. The included lung apices are clear.      Impression    IMPRESSION:   1.  Acute bilateral palatine tonsillitis, right greater than left. There is moderate associated effacement of the oropharyngeal airway.  2.  Loculated right peritonsillar abscess measuring up to 0.5 cm in AP dimensions, 0.6 cm in transverse dimensions and 2.4 cm in craniocaudal dimensions.  3.  Asymmetric enlargement and heterogeneous enhancement of the right submandibular gland, concerning for superimposed acute right-sided submandibular gland sialoadenitis, possibly reactive.  4.  Additional findings above.             PROCEDURE  I&D of PTA  Consent obtained.  Oropharynx topicalized with benzocaine spray.  3cc of 1% lidocaine with 1:100,000 epinephrine injected.  An incision was made into the right soft palate near the superior aspect of the anterior pillar and dilated using a curved dillan. About 5cc of purulent fluid drained. A culture was taken of this drainage.    Assessment and Plan  Iker Berrios is a 26 year old male with a past medical history of of HIV and right PTA presents with a right PTA now s/p incision and drainage.    - 10 days augmentin 875/125 BID  - f/u culture results  - no further steroids  - soft diet for 3 days; advance as tolerated  - f/u in ENT clinic for tonsillectomy planning given history of multiple PTAs  - tylenol PRN for pain    The patient and plan was discussed with Dr. Garduno.    vEa Antoine PA-C  Otolaryngology-Head & Neck Surgery  Please page ENT with questions by dialing [phone] and entering job code [phone] when prompted.

## 2022-08-03 LAB
BACTERIA ABSC ANAEROBE+AEROBE CULT: ABNORMAL
BACTERIA ABSC ANAEROBE+AEROBE CULT: ABNORMAL
GRAM STAIN RESULT: ABNORMAL
GRAM STAIN RESULT: ABNORMAL

## 2022-08-03 NOTE — RESULT ENCOUNTER NOTE
Final Abscess Aerobic Bacterial Culture (specimen - right tonsil) report on 8/3/22  [facility] Emergency Dept discharge antibiotic prescribed: amoxicillin-clavulanate (AUGMENTIN) 400-57 MG/5ML suspension - Take 10.9 mLs (875 mg) by mouth 2 times daily for 10 days (per notes initially prescribe tablets and then changed to liquid)  #1. Bacteria, Streptococcus pyrogenes sero group A ,  is [SUSCEPTIBLE] to antibiotic.  Incision and Drainage performed in the [facility] ED: Yes  Recommendations in treatment per [facility] ED lab result culture protocol

## 2022-08-08 ENCOUNTER — ALLIED HEALTH/NURSE VISIT (OUTPATIENT)
Dept: INFECTIOUS DISEASES | Facility: CLINIC | Age: 27
End: 2022-08-08
Attending: INTERNAL MEDICINE
Payer: COMMERCIAL

## 2022-08-08 VITALS — DIASTOLIC BLOOD PRESSURE: 73 MMHG | SYSTOLIC BLOOD PRESSURE: 131 MMHG

## 2022-08-08 DIAGNOSIS — Z20.89 CONTACT WITH AND (SUSPECTED) EXPOSURE TO OTHER COMMUNICABLE DISEASES: Primary | ICD-10-CM

## 2022-08-08 LAB
BACTERIA ABSC ANAEROBE+AEROBE CULT: ABNORMAL
BACTERIA ABSC ANAEROBE+AEROBE CULT: ABNORMAL

## 2022-08-08 PROCEDURE — 250N000011 HC RX IP 250 OP 636: Performed by: INTERNAL MEDICINE

## 2022-08-08 PROCEDURE — 90471 IMMUNIZATION ADMIN: CPT | Performed by: INTERNAL MEDICINE

## 2022-08-08 PROCEDURE — 90611 SMALLPOX&MONKEYPOX VAC 0.5ML: CPT | Performed by: INTERNAL MEDICINE

## 2022-08-08 RX ADMIN — RABIES VACCINE 0.5 ML: KIT at 13:40

## 2022-08-08 NOTE — PROGRESS NOTES
Nurse review: Monekypox and Jynneos vaccine administration criteria:     Criteria for Jynneos vaccine reviewed via In person.     Reviewed Jynneos vaccine criteria Yes  and patient is a candidate for the vaccine: Yes     Patient does not have symptoms of monkeypox: Yes. If patient has symptoms of monkeypox then a provider visit should be scheduled and vaccination not administered.    Patient does not have contraindications to the monkeypox vaccine: Yes.Contraindications for vaccine include:   Allergies to a Jynneos vaccine or to an ingredient in the the vaccine (gentamicin, ciprofloxacin,egg protein).    Date of vaccine: 08/08/2022

## 2022-08-08 NOTE — NURSING NOTE
Chief Complaint   Patient presents with     Allied Health Visit     Monkeypox vaccine     Monkeypox vaccine given today. See MAR for details.  Margarita Evangelista, CMA

## 2022-08-09 ENCOUNTER — TELEPHONE (OUTPATIENT)
Dept: EMERGENCY MEDICINE | Facility: CLINIC | Age: 27
End: 2022-08-09

## 2022-08-09 NOTE — TELEPHONE ENCOUNTER
[facility] Emergency Department Lab result notification    [place] ED lab result protocol used  Culture Protocol    Reason for call  Notify of lab results, assess symptoms,  review ED providers recommendations/discharge instructions (if necessary) and advise per ED lab result f/u protocol    Lab Result (including Rx patient on, if applicable)  Final Anaerobic Bacterial Culture (specimen - Tonsil, Right; Abscess) report on 8/8/22  [facility] Emergency Dept discharge antibiotic prescribed: amoxicillin-clavulanate (AUGMENTIN) 400-57 MG/5ML suspension - Take 10.9 mLs (875 mg) by mouth 2 times daily for 10 days (per notes initially prescribe tablets and then changed to liquid)  #1. Bacteria, Fusobacterium nucleatum Abnormal,  Susceptibilities not routinely done  Incision and Drainage performed in the [facility] ED: YES  Recommendations in treatment per [facility] ED lab result culture protocol    Final Abscess Aerobic Bacterial Culture (specimen - Tonsil, Right; Abscess) report on 8/3/22  [facility] Emergency Dept discharge antibiotic prescribed: amoxicillin-clavulanate (AUGMENTIN) 400-57 MG/5ML suspension - Take 10.9 mLs (875 mg) by mouth 2 times daily for 10 days (per notes initially prescribe tablets and then changed to liquid)  #1. Bacteria, Streptococcus pyogenes (Group A Streptococcus),  is [SUSCEPTIBLE] to antibiotic Augmentin  Incision and Drainage performed in the [place] ED: YES  Recommendations in treatment per [facility] ED lab result culture protocol    Information table from Emergency Dept Provider visit on 8/1/22  Symptoms reported at ED visit (Chief complaint, HPI) Chief Complaint   Patient presents with     Otalgia     Pharyngitis      HPI  Iker Berrios is a 26 year old male who has past medical history of HIV presenting with sore throat for 2 to 3 days.  No fevers but has felt achy.  No cough is had some shortness of breath due to swelling in his neck.   Denies body aches or fevers.  Has some mild right ear pain with no change in hearing or rashes noted.  No headache or neck stiffness.  Trouble swallowing due to pain but no drooling.  Denies vomiting or diarrhea.  Tried some Tylenol with some relief.  Pain does worsen when he lies down.  He thinks his voice is changes well.     Significant Medical hx, if applicable (i.e. CKD, diabetes) HIV   Allergies No Known Allergies   Weight, if applicable Wt Readings from Last 2 Encounters:   08/01/22 95.3 kg (210 lb)   04/06/22 101.1 kg (222 lb 14.4 oz)      Coumadin/Warfarin [Yes /No] NO   Creatinine Level (mg/dl) Creatinine   Date Value Ref Range Status   08/01/2022 1.33 (H) 0.67 - 1.17 mg/dL Final   01/05/2021 1.43 (H) 0.66 - 1.25 mg/dL Final      Creatinine clearance (ml/min), if applicable Serum creatinine: 1.33 mg/dL (H) 08/01/22 0406  Estimated creatinine clearance: 95.8 mL/min (A)   ED providers Impression and Plan (applicable information) MDM  Patient presenting with throat pain found to have peritonsillar abscess.  He is protecting his airway at this point without airway compromise.  It is loculated does not seem to be superficial on exam or CT scan.  ENT to see the patient.  Antibiotics and steroids given.  Otherwise appears well and afebrile.  No symptoms or signs of milky pox in this patient, he has no known exposures but is at high risk.     I have reviewed the nursing notes. I have reviewed the findings, diagnosis, plan and need for follow up with the patient.          New Prescriptions     AMOXICILLIN-CLAVULANATE (AUGMENTIN) 875-125 MG TABLET    Take 1 tablet by mouth 2 times daily for 7 days         Final diagnoses:   Peritonsillar abscess         --  Teja Díaz  [facility] EMERGENCY DEPARTMENT  8/1/2022     Teja Díaz MD  08/01/22 0650     ED diagnosis  Peritonsillar abscess  Encounter for incision and drainage procedure   ED provider   Teja Díaz MD       RN  "Assessment (Patient s current Symptoms), include time called.  1:43 PM - patient reports he is doing fine - and taking antibiotics  Abscess/Tonsil:  Speaking in full sentences, no difficulty swallowing/drooling - swelling has been decreasing - \"looks like its healing from looking at it\"   Fever:  No  Has appointment with ENT 8/11         RN Recommendations/Instructions per [facility] lab result protocol  Patient notified of lab result and treatment recommendations. Reporting improvement of symptoms - encouraged continuation and completion of antibiotic - please follow up for any fevers or worsening symptoms.  Patient verbalized understanding and agrees with plan - all questions answered      Please Contact your PCP clinic or return to the Emergency department if your:    Symptoms return.    Symptoms do not improve after 3 days on antibiotic.    Symptoms do not resolve after completing antibiotic.    Symptoms worsen or other concerning symptom's.    PCP follow-up Questions asked: YES       Omaira Jenkins RN  [facility]  Emergency Dept Lab Result RN  Ph# [phone]     Copy of Lab result   Anaerobic bacterial culture  Order: [id number]   Status: Final result     Visible to patient: Yes (seen)    Specimen Information: Tonsil, Right; Abscess         5 Result Notes    Culture 1+ Fusobacterium nucleatum Abnormal     Susceptibilities not routinely done   1+ Normal yuval            Resulting Agency: [facility]           Specimen Collected: 08/01/22  9:11 AM Last Resulted: 08/08/22  8:13 AM            Abscess Aerobic Bacterial Culture Routine with Gram Stain  Order: [id number]   Status: Final result     Visible to patient: Yes (seen)    Specimen Information: Tonsil, Right; Abscess         3 Result Notes    Culture 2+ Streptococcus pyogenes (Group A Streptococcus) Abnormal     This organism is susceptible to ampicillin, penicillin, vancomycin and the cephalosporins. If treatment is required and " your patient is allergic to penicillin, contact the microbiology lab within 5 days to request susceptibility testing.   2+ Normal yuval                Gram Stain Result   Abnormal   2+ Gram positive cocci      4+ WBC seen   Predominantly PMNs           Resulting Agency: [facility]           Specimen Collected: 08/01/22  9:11 AM Last Resulted: 08/03/22  8:25 AM

## 2022-10-09 ENCOUNTER — HOSPITAL ENCOUNTER (EMERGENCY)
Facility: CLINIC | Age: 27
Discharge: HOME | End: 2022-10-09
Attending: EMERGENCY MEDICINE | Admitting: EMERGENCY MEDICINE
Payer: COMMERCIAL

## 2022-10-09 VITALS
DIASTOLIC BLOOD PRESSURE: 92 MMHG | HEART RATE: 79 BPM | OXYGEN SATURATION: 98 % | RESPIRATION RATE: 18 BRPM | TEMPERATURE: 97.9 F | SYSTOLIC BLOOD PRESSURE: 146 MMHG

## 2022-10-09 DIAGNOSIS — J02.0 ACUTE STREPTOCOCCAL PHARYNGITIS: ICD-10-CM

## 2022-10-09 LAB — GROUP A STREP BY PCR: NOT DETECTED

## 2022-10-09 PROCEDURE — 99284 EMERGENCY DEPT VISIT MOD MDM: CPT | Performed by: EMERGENCY MEDICINE

## 2022-10-09 PROCEDURE — 87651 STREP A DNA AMP PROBE: CPT | Performed by: EMERGENCY MEDICINE

## 2022-10-09 PROCEDURE — 99283 EMERGENCY DEPT VISIT LOW MDM: CPT | Performed by: EMERGENCY MEDICINE

## 2022-10-09 PROCEDURE — 87491 CHLMYD TRACH DNA AMP PROBE: CPT | Performed by: EMERGENCY MEDICINE

## 2022-10-09 PROCEDURE — 87591 N.GONORRHOEAE DNA AMP PROB: CPT | Performed by: EMERGENCY MEDICINE

## 2022-10-09 RX ORDER — AMOXICILLIN 500 MG/1
500 CAPSULE ORAL 2 TIMES DAILY
Qty: 20 CAPSULE | Refills: 0 | Status: SHIPPED | OUTPATIENT
Start: 2022-10-09 | End: 2022-10-19

## 2022-10-09 ASSESSMENT — ENCOUNTER SYMPTOMS
TROUBLE SWALLOWING: 1
FEVER: 0
VOICE CHANGE: 0
SORE THROAT: 1

## 2022-10-09 NOTE — ED PROVIDER NOTES
[place] EMERGENCY DEPARTMENT ([facility])  10/09/22    History     Chief Complaint   Patient presents with     Pharyngitis     The history is provided by medical records.     Iker Berrios is a 26 year old male with history of HIV who presents to the Emergency Department for evaluation of pharyngitis.  Has had symptoms since last night and a friend also recently tested + for strep.   Increased throat clearing and mild triggered cough.   No fevers or chills. No trismus or voice change.   Tonsils enlarged but not asymmetrically.  Has a hx of HIV infection and peritonsillar abscess requiring I&D twice.   Also had + gonorrhea test from the throat 2/2022 treated with IM ceftriaxone.     Epic Records Reviewed. Patient was seen on 8/1/22 for a right-sided peritonsillar abscess that was drained at [facility] ED.       Past Medical History  Past Medical History:   Diagnosis Date     Human immunodeficiency virus I infection (H) 06/19/2018    Likely infected 3/18.  On antiretroviral therapy with Triumeq since 7/9/18.  Pre-treatment HIV viral load 2,654 copies/ml.  Scott CD4+ cell count 763.     Past Surgical History:   Procedure Laterality Date     none       amoxicillin (AMOXIL) 500 MG capsule  TRIUMEQ 600- MG per tablet      No Known Allergies  Family History  Family History   Problem Relation Age of Onset     Diabetes Maternal Grandmother      Asthma No family hx of      C.A.D. No family hx of      Hypertension No family hx of      Cerebrovascular Disease No family hx of      Cancer No family hx of      Social History   Social History     Tobacco Use     Smoking status: Never     Smokeless tobacco: Never   Substance Use Topics     Alcohol use: Yes     Drug use: No      Past medical history, past surgical history, medications, allergies, family history, and social history were reviewed with the patient. No additional pertinent items.       Review of Systems   Constitutional: Negative for  fever.   HENT: Positive for sore throat and trouble swallowing. Negative for voice change.    Genitourinary: Negative.    Allergic/Immunologic: Positive for immunocompromised state.         Physical Exam   BP: (!) 146/92  Pulse: 79  Temp: 97.9  F (36.6  C)  Resp: 18  SpO2: 98 %     Physical Exam  Gen:A&Ox3, no acute distress  HEENT:PERRL, bilateral tonsil enlargement and diffuse pharyngeal erythema without exudates. No uvula deviation or fullness of the peritonsillar space. Floor of mouth is soft. No trismus.   Neck:no bony tenderness or step offs, no JVD, trachea midline, no cervical adenopathy noted.   CV:RRR without murmurs  PULM:Clear to auscultation bilaterally  UE:No traumatic injuries, skin normal  LE:no traumatic injuries, skin normal  Neuro:CN II-XII intact, gait stable, coordination grossly normal  Skin: no rashes or ecchymoses    ED Course      Procedures    No results found for any visits on 10/09/22.  Medications - No data to display     Assessments & Plan (with Medical Decision Making)   27 yo M with a hx of HIV and prior peritonsillar abscess x2 presenting with pharyngitis.   Vitals stable.   Exam without signs of deep space infection including no signs of PTA.   Swabs for group A strep and STIs sent.   Treated empirically with amoxicillin.   Has scheduled follow up with ENT in 4 days.     I have reviewed the nursing notes. I have reviewed the findings, diagnosis, plan and need for follow up with the patient.    New Prescriptions    AMOXICILLIN (AMOXIL) 500 MG CAPSULE    Take 1 capsule (500 mg) by mouth 2 times daily for 10 days       Final diagnoses:   Acute streptococcal pharyngitis       --  Rita Sandoval MD  [facility] EMERGENCY DEPARTMENT  10/9/2022     Rita Sandoval MD  10/09/22 1024

## 2022-10-09 NOTE — ED TRIAGE NOTES
Pt ambulatory to triage with c/o pharyngitis. Pt states he was with a close friend who was recently diagnosed with strep and would like to be tested today. Pt A&Ox4, VSS on RA, denies pain at this time.      Triage Assessment     Row Name 10/09/22 0958       Triage Assessment (Adult)    Airway WDL WDL       Respiratory WDL    Respiratory WDL WDL       Skin Circulation/Temperature WDL    Skin Circulation/Temperature WDL WDL       Cardiac WDL    Cardiac WDL WDL       Peripheral/Neurovascular WDL    Peripheral Neurovascular WDL WDL       Cognitive/Neuro/Behavioral WDL    Cognitive/Neuro/Behavioral WDL WDL

## 2022-10-09 NOTE — DISCHARGE INSTRUCTIONS
Thank you for coming to the [facility] Emergency Department.     Please keep your scheduled follow up appointment with ENT on Thursday. Expect a call from the hospital if STI testing today shows signs of infection.     Start Amoxicillin twice daily for 10 days.

## 2022-10-10 LAB
C TRACH DNA SPEC QL NAA+PROBE: NEGATIVE
N GONORRHOEA DNA SPEC QL NAA+PROBE: NEGATIVE

## 2022-10-10 NOTE — RESULT ENCOUNTER NOTE
Final result for both N. Gonorrhoeae PCR and Chlamydia Trachomatis PCR are NEGATIVE.  No treatment or change in treatment per [facility] ED Lab Result N. Gonorrhea AND/OR Chlamydia T. protocol.

## 2022-10-13 ENCOUNTER — PREP FOR PROCEDURE (OUTPATIENT)
Dept: SLEEP MEDICINE | Facility: CLINIC | Age: 27
End: 2022-10-13

## 2022-10-13 ENCOUNTER — OFFICE VISIT (OUTPATIENT)
Dept: OTOLARYNGOLOGY | Facility: CLINIC | Age: 27
End: 2022-10-13
Payer: COMMERCIAL

## 2022-10-13 ENCOUNTER — TELEPHONE (OUTPATIENT)
Dept: SLEEP MEDICINE | Facility: CLINIC | Age: 27
End: 2022-10-13

## 2022-10-13 VITALS
BODY MASS INDEX: 30.85 KG/M2 | DIASTOLIC BLOOD PRESSURE: 82 MMHG | WEIGHT: 215 LBS | HEART RATE: 51 BPM | SYSTOLIC BLOOD PRESSURE: 122 MMHG

## 2022-10-13 DIAGNOSIS — J03.01 ACUTE RECURRENT STREPTOCOCCAL TONSILLITIS: Primary | ICD-10-CM

## 2022-10-13 DIAGNOSIS — J35.1 TONSILLAR HYPERTROPHY: ICD-10-CM

## 2022-10-13 DIAGNOSIS — J03.01 RECURRENT STREPTOCOCCAL TONSILLITIS: Primary | ICD-10-CM

## 2022-10-13 PROCEDURE — 99204 OFFICE O/P NEW MOD 45 MIN: CPT | Performed by: OTOLARYNGOLOGY

## 2022-10-13 NOTE — TELEPHONE ENCOUNTER
Type of surgery: TONSILLECTOMY, ADULT (Bilateral)    CPT 49560   Acute recurrent streptococcal tonsillitis J03.01     Tonsillar hypertrophy J35.1    Location of surgery: [facility]  Date and time of surgery: 12/20  Surgeon: omair  Pre-Op Appt Date: pt will call [facility]  Post-Op Appt Date: 1/12/23   Packet sent out: Yes  Pre-cert/Authorization completed: No prior auth required per [employer] online list.    Date: 11/25/22    Katie Hall  Financial Securing/Prior Auth Dept  [phone]

## 2022-10-13 NOTE — PROGRESS NOTES
ENT Consultation    Iker Berrios is a 26 year old male who is seen in consultation at the request of self.      History of Present Illness - Iker Brerios is a 26 year old male presents for evaluation of recurrent tonsillitis and history of peritonsillar abscess.  This young man has had 2 episodes of peritonsillar abscess first in 2019 and latest one was in August of this year which apparently was drained was on the right side with the same side the first time as well.  He also endorses having 3 positive strep throats a year at least for at least last 3 years.  Otherwise denies any snoring denies any difficulty swallowing or breathing.  EXAM: CT SOFT TISSUE NECK W CONTRAST  LOCATION: [facility]  DATE/TIME: 8/1/2022 5:25 AM     INDICATION: Epiglottitis.  COMPARISON: None.  CONTRAST: Iopamidol (ISOVUE 370) solution 100 mL.  TECHNIQUE: Routine CT Soft Tissue Neck with IV contrast. Multiplanar reformats. Dose reduction techniques were used.     FINDINGS:      MUCOSAL SPACES/SOFT TISSUES: There is heterogeneous enhancement and enlargement of the bilateral palatine tonsils, right greater than left. Within the lateral and inferior margins of the right palatine tonsil, there is an irregular and loculated   rim-enhancing fluid collection measuring approximately 6 mm in transverse dimensions, 5 mm in AP dimensions and approximately 2.4 cm in craniocaudal dimensions. Mild adjacent edema is noted tracking into the nasopharynx superiorly and into the right   parapharyngeal fat plane laterally. A small amount of adjacent prevertebral edema is noted on the right. There is moderate effacement of the oropharyngeal airway, however the hypopharyngeal airway is patent. The epiglottis is unremarkable. The vocal   apparatus is unremarkable. Normal subcutaneous soft tissues. Normal oral cavity,  spaces, and floor of mouth structures.     LYMPH NODES: Increased in number and  prominent in size multistation cervical lymph nodes, right greater than left. No necrotic or suppurative lymph nodes identified. These are likely reactive.      SALIVARY GLANDS: Normal bilateral parotid glands. There is mild asymmetric enlargement of the right submandibular gland with associated increased, heterogeneous enhancement. Question small volume fluid within the right submandibular space.     THYROID: Normal.      VESSELS: Vascular structures of the neck are patent.     VISUALIZED INTRACRANIAL/ORBITS/SINUSES: No abnormality of the visualized intracranial compartment or orbits. Visualized paranasal sinuses and mastoid air cells are clear.     OTHER: No destructive osseous lesion. The included lung apices are clear.                                                                      IMPRESSION:   1.  Acute bilateral palatine tonsillitis, right greater than left. There is moderate associated effacement of the oropharyngeal airway.  2.  Loculated right peritonsillar abscess measuring up to 0.5 cm in AP dimensions, 0.6 cm in transverse dimensions and 2.4 cm in craniocaudal dimensions.  3.  Asymmetric enlargement and heterogeneous enhancement of the right submandibular gland, concerning for superimposed acute right-sided submandibular gland sialoadenitis, possibly reactive.  4.  Additional findings above.                 Past Medical History -   Past Medical History:   Diagnosis Date     Human immunodeficiency virus I infection (H) 06/19/2018    Likely infected 3/18.  On antiretroviral therapy with Triumeq since 7/9/18.  Pre-treatment HIV viral load 2,654 copies/ml.  Scott CD4+ cell count 763.       Current Medications -   Current Outpatient Medications:      amoxicillin (AMOXIL) 500 MG capsule, Take 1 capsule (500 mg) by mouth 2 times daily for 10 days, Disp: 20 capsule, Rfl: 0     TRIUMEQ 600- MG per tablet, TAKE 1 TABLET BY MOUTH EVERY DAY, Disp: 30 tablet, Rfl: 11    Allergies - No Known  Allergies    Social History -   Social History     Socioeconomic History     Marital status: Single     Spouse name: None     Number of children: None     Years of education: None     Highest education level: None   Occupational History     Occupation: Student   Tobacco Use     Smoking status: Never     Smokeless tobacco: Never   Substance and Sexual Activity     Alcohol use: Yes     Drug use: No     Sexual activity: Yes     Partners: Male     Birth control/protection: Condom     Comment: Use of condoms 50% of the time    Social History Narrative    19:  Denies smoking, alcohol, illicit drugs.  Sexually active with men only, both insertive and receptive and inconsistent condom use. Currently enrolled at the [employer] majoring in [employer].   service [id number] (worked on an [employer]). Deployed to [employer] for three years.  Brother recently .             Family History -   Family History   Problem Relation Age of Onset     Diabetes Maternal Grandmother      Asthma No family hx of      C.A.D. No family hx of      Hypertension No family hx of      Cerebrovascular Disease No family hx of      Cancer No family hx of        Review of Systems - As per HPI and PMHx, otherwise review of system review of the head and neck negative. Otherwise 10+ review systems negative.    Physical Exam  /82   Pulse 51   Wt 97.5 kg (215 lb)   BMI 30.85 kg/m    BMI: Body mass index is 30.85 kg/m .    General - The patient is well nourished and well developed, and appears to have good nutritional status.  Alert and oriented to person and place, answers questions and cooperates with examination appropriately.    SKIN - No suspicious lesions or rashes.  Respiration - No respiratory distress.  Head and Face - Normocephalic and atraumatic, with no gross asymmetry noted of the contour of the facial features.  The facial nerve is intact, with strong symmetric movements.    Voice and Breathing - The patient was breathing  comfortably without the use of accessory muscles. The patients voice was clear and strong, and had appropriate pitch and quality.    Ears - Bilateral pinna and EACs with normal appearing overlying skin. Tympanic membrane intact with good mobility on pneumatic otoscopy bilaterally. Bony landmarks of the ossicular chain are normal. The tympanic membranes are normal in appearance. No retraction, perforation, or masses.  No fluid or purulence was seen in the external canal or the middle ear.     Eyes - Extraocular movements intact.  Sclera were not icteric or injected, conjunctiva were pink and moist.    Mouth - Examination of the oral cavity showed pink, healthy oral mucosa. No lesions or ulcerations noted.  The tongue was mobile and midline, and the dentition were in good condition.      Throat - The walls of the oropharynx were smooth, pink, moist, symmetric, and had no lesions or ulcerations.  The tonsillar pillars and soft palate were symmetric.  Tonsils appear to be 3+ in size without exudates.  Right is slightly larger than the left but not significantly.  The uvula was midline on elevation.    Neck - Normal midline excursion of the laryngotracheal complex during swallowing.  Full range of motion on passive movement.  Palpation of the occipital, submental, submandibular, internal jugular chain, and supraclavicular nodes did not demonstrate any abnormal lymph nodes or masses.  The carotid pulse was palpable bilaterally.  Palpation of the thyroid was soft and smooth, with no nodules or goiter appreciated.  The trachea was mobile and midline.    Nose - External contour is symmetric, no gross deflection or scars.  Nasal mucosa is pink and moist with no abnormal mucus.  The septum was midline and non-obstructive, turbinates of normal size and position.  No polyps, masses, or purulence noted on examination.    Neuro - Nonfocal neuro exam is normal, CN 2 through 12 intact, normal gait and muscle tone.      Performed in  clinic today:  No procedures preformed in clinic today      A/P - Iker Berrios is a 26 year old male with recurrent streptococcal tonsillitis 2 episodes of peritonsillar abscess.  This definitely needs criteria for tonsillectomy.  We discussed risks and benefits of tonsillectomy.Based on the physical exam and history, my recommendation is for tonsillectomy ( without adenoidectomy).  The remainder of the visit was spent discussing the risks and benefits of tonsillectomy.      These included:The risks of general anesthesia, bleeding, infection, possible need for emergency surgery to control bleeding, possible alteration of speech and swallowing, and even the possibility of continued throat problems following surgery.They understood and wished to call in and schedule.      Toby Cabrera MD

## 2022-10-13 NOTE — LETTER
10/13/2022         RE: Iker Berrios  [address]  [place] [address]        Dear Colleague,    [facility] you for referring your patient, [facility]ker Berrios[facility]. Please see a copy of my visit note below.    ENT Consultation    Iker Berrios is a 26 year old male who is seen in consultation at the request of self.      History of Present Illness - Iker Berrios is a 26 year old male presents for evaluation of recurrent tonsillitis and history of peritonsillar abscess.  This young man has had 2 episodes of peritonsillar abscess first in 2019 and latest one was in August of this year which apparently was drained was on the right side with the same side the first time as well.  He also endorses having 3 positive strep throats a year at least for at least last 3 years.  Otherwise denies any snoring denies any difficulty swallowing or breathing.  EXAM: CT SOFT TISSUE NECK W CONTRAST  LOCATION: [facility]  DATE/TIME: 8/1/2022 5:25 AM     INDICATION: Epiglottitis.  COMPARISON: None.  CONTRAST: Iopamidol (ISOVUE 370) solution 100 mL.  TECHNIQUE: Routine CT Soft Tissue Neck with IV contrast. Multiplanar reformats. Dose reduction techniques were used.     FINDINGS:      MUCOSAL SPACES/SOFT TISSUES: There is heterogeneous enhancement and enlargement of the bilateral palatine tonsils, right greater than left. Within the lateral and inferior margins of the right palatine tonsil, there is an irregular and loculated   rim-enhancing fluid collection measuring approximately 6 mm in transverse dimensions, 5 mm in AP dimensions and approximately 2.4 cm in craniocaudal dimensions. Mild adjacent edema is noted tracking into the nasopharynx superiorly and into the right   parapharyngeal fat plane laterally. A small amount of adjacent prevertebral edema is noted on the right. There is moderate effacement of the oropharyngeal airway,  however the hypopharyngeal airway is patent. The epiglottis is unremarkable. The vocal   apparatus is unremarkable. Normal subcutaneous soft tissues. Normal oral cavity,  spaces, and floor of mouth structures.     LYMPH NODES: Increased in number and prominent in size multistation cervical lymph nodes, right greater than left. No necrotic or suppurative lymph nodes identified. These are likely reactive.      SALIVARY GLANDS: Normal bilateral parotid glands. There is mild asymmetric enlargement of the right submandibular gland with associated increased, heterogeneous enhancement. Question small volume fluid within the right submandibular space.     THYROID: Normal.      VESSELS: Vascular structures of the neck are patent.     VISUALIZED INTRACRANIAL/ORBITS/SINUSES: No abnormality of the visualized intracranial compartment or orbits. Visualized paranasal sinuses and mastoid air cells are clear.     OTHER: No destructive osseous lesion. The included lung apices are clear.                                                                      IMPRESSION:   1.  Acute bilateral palatine tonsillitis, right greater than left. There is moderate associated effacement of the oropharyngeal airway.  2.  Loculated right peritonsillar abscess measuring up to 0.5 cm in AP dimensions, 0.6 cm in transverse dimensions and 2.4 cm in craniocaudal dimensions.  3.  Asymmetric enlargement and heterogeneous enhancement of the right submandibular gland, concerning for superimposed acute right-sided submandibular gland sialoadenitis, possibly reactive.  4.  Additional findings above.                 Past Medical History -   Past Medical History:   Diagnosis Date     Human immunodeficiency virus I infection (H) 06/19/2018    Likely infected 3/18.  On antiretroviral therapy with Triumeq since 7/9/18.  Pre-treatment HIV viral load 2,654 copies/ml.  Scott CD4+ cell count 763.       Current Medications -   Current Outpatient Medications:       amoxicillin (AMOXIL) 500 MG capsule, Take 1 capsule (500 mg) by mouth 2 times daily for 10 days, Disp: 20 capsule, Rfl: 0     TRIUMEQ 600- MG per tablet, TAKE 1 TABLET BY MOUTH EVERY DAY, Disp: 30 tablet, Rfl: 11    Allergies - No Known Allergies    Social History -   Social History     Socioeconomic History     Marital status: Single     Spouse name: None     Number of children: None     Years of education: None     Highest education level: None   Occupational History     Occupation: Student   Tobacco Use     Smoking status: Never     Smokeless tobacco: Never   Substance and Sexual Activity     Alcohol use: Yes     Drug use: No     Sexual activity: Yes     Partners: Male     Birth control/protection: Condom     Comment: Use of condoms 50% of the time    Social History Narrative    19:  Denies smoking, alcohol, illicit drugs.  Sexually active with men only, both insertive and receptive and inconsistent condom use. Currently enrolled at the [employer] majoring in [employer].   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  Brother recently .             Family History -   Family History   Problem Relation Age of Onset     Diabetes Maternal Grandmother      Asthma No family hx of      C.A.D. No family hx of      Hypertension No family hx of      Cerebrovascular Disease No family hx of      Cancer No family hx of        Review of Systems - As per HPI and PMHx, otherwise review of system review of the head and neck negative. Otherwise 10+ review systems negative.    Physical Exam  /82   Pulse 51   Wt 97.5 kg (215 lb)   BMI 30.85 kg/m    BMI: Body mass index is 30.85 kg/m .    General - The patient is well nourished and well developed, and appears to have good nutritional status.  Alert and oriented to person and place, answers questions and cooperates with examination appropriately.    SKIN - No suspicious lesions or rashes.  Respiration - No  respiratory distress.  Head and Face - Normocephalic and atraumatic, with no gross asymmetry noted of the contour of the facial features.  The facial nerve is intact, with strong symmetric movements.    Voice and Breathing - The patient was breathing comfortably without the use of accessory muscles. The patients voice was clear and strong, and had appropriate pitch and quality.    Ears - Bilateral pinna and EACs with normal appearing overlying skin. Tympanic membrane intact with good mobility on pneumatic otoscopy bilaterally. Bony landmarks of the ossicular chain are normal. The tympanic membranes are normal in appearance. No retraction, perforation, or masses.  No fluid or purulence was seen in the external canal or the middle ear.     Eyes - Extraocular movements intact.  Sclera were not icteric or injected, conjunctiva were pink and moist.    Mouth - Examination of the oral cavity showed pink, healthy oral mucosa. No lesions or ulcerations noted.  The tongue was mobile and midline, and the dentition were in good condition.      Throat - The walls of the oropharynx were smooth, pink, moist, symmetric, and had no lesions or ulcerations.  The tonsillar pillars and soft palate were symmetric.  Tonsils appear to be 3+ in size without exudates.  Right is slightly larger than the left but not significantly.  The uvula was midline on elevation.    Neck - Normal midline excursion of the laryngotracheal complex during swallowing.  Full range of motion on passive movement.  Palpation of the occipital, submental, submandibular, internal jugular chain, and supraclavicular nodes did not demonstrate any abnormal lymph nodes or masses.  The carotid pulse was palpable bilaterally.  Palpation of the thyroid was soft and smooth, with no nodules or goiter appreciated.  The trachea was mobile and midline.    Nose - External contour is symmetric, no gross deflection or scars.  Nasal mucosa is pink and moist with no abnormal mucus.   The septum was midline and non-obstructive, turbinates of normal size and position.  No polyps, masses, or purulence noted on examination.    Neuro - Nonfocal neuro exam is normal, CN 2 through 12 intact, normal gait and muscle tone.      Performed in clinic today:  No procedures preformed in clinic today      A/P - Iker Berrios is a 26 year old male with recurrent streptococcal tonsillitis 2 episodes of peritonsillar abscess.  This definitely needs criteria for tonsillectomy.  We discussed risks and benefits of tonsillectomy.Based on the physical exam and history, my recommendation is for tonsillectomy ( without adenoidectomy).  The remainder of the visit was spent discussing the risks and benefits of tonsillectomy.      These included:The risks of general anesthesia, bleeding, infection, possible need for emergency surgery to control bleeding, possible alteration of speech and swallowing, and even the possibility of continued throat problems following surgery.They understood and wished to call in and schedule.      Toby Cabrera MD      Again, thank you for allowing me to participate in the care of your patient.        Sincerely,        Toby Cabrera MD, MD

## 2022-10-25 ENCOUNTER — ALLIED HEALTH/NURSE VISIT (OUTPATIENT)
Dept: INFECTIOUS DISEASES | Facility: CLINIC | Age: 27
End: 2022-10-25
Attending: INTERNAL MEDICINE
Payer: COMMERCIAL

## 2022-10-25 ENCOUNTER — LAB (OUTPATIENT)
Dept: LAB | Facility: CLINIC | Age: 27
End: 2022-10-25
Payer: COMMERCIAL

## 2022-10-25 DIAGNOSIS — Z11.3 ROUTINE SCREENING FOR STI (SEXUALLY TRANSMITTED INFECTION): ICD-10-CM

## 2022-10-25 PROCEDURE — 36415 COLL VENOUS BLD VENIPUNCTURE: CPT | Performed by: PATHOLOGY

## 2022-10-25 PROCEDURE — 87591 N.GONORRHOEAE DNA AMP PROB: CPT | Performed by: INTERNAL MEDICINE

## 2022-10-25 PROCEDURE — 87591 N.GONORRHOEAE DNA AMP PROB: CPT

## 2022-10-25 PROCEDURE — 87491 CHLMYD TRACH DNA AMP PROBE: CPT

## 2022-10-25 PROCEDURE — 87491 CHLMYD TRACH DNA AMP PROBE: CPT | Performed by: INTERNAL MEDICINE

## 2022-10-25 PROCEDURE — 86780 TREPONEMA PALLIDUM: CPT | Performed by: INTERNAL MEDICINE

## 2022-10-25 NOTE — NURSING NOTE
Chief Complaint   Patient presents with     Allied Health Visit     Swabs     Swabs collected and sent to lab for processing. No Monkey-pox vaccine today, pt stated he received the 2nd dose from [facility].    Margarita Evangelista CMA

## 2022-11-08 ENCOUNTER — TELEPHONE (OUTPATIENT)
Dept: INFECTIOUS DISEASES | Facility: CLINIC | Age: 27
End: 2022-11-08

## 2022-11-08 NOTE — TELEPHONE ENCOUNTER
[facility] 11/8 to schedule 1 year follow up with Dr. Shaw for around 4/6/23 via in-person per checkout notes from 4/6/22.

## 2022-12-14 ENCOUNTER — OFFICE VISIT (OUTPATIENT)
Dept: FAMILY MEDICINE | Facility: CLINIC | Age: 27
End: 2022-12-14
Payer: COMMERCIAL

## 2022-12-14 VITALS
HEIGHT: 70 IN | WEIGHT: 233 LBS | SYSTOLIC BLOOD PRESSURE: 122 MMHG | OXYGEN SATURATION: 100 % | TEMPERATURE: 98.1 F | BODY MASS INDEX: 33.36 KG/M2 | RESPIRATION RATE: 16 BRPM | DIASTOLIC BLOOD PRESSURE: 70 MMHG | HEART RATE: 88 BPM

## 2022-12-14 DIAGNOSIS — Z21 HUMAN IMMUNODEFICIENCY VIRUS I INFECTION (H): ICD-10-CM

## 2022-12-14 DIAGNOSIS — J03.01 RECURRENT STREPTOCOCCAL TONSILLITIS: ICD-10-CM

## 2022-12-14 DIAGNOSIS — Z23 NEED FOR PROPHYLACTIC VACCINATION AND INOCULATION AGAINST INFLUENZA: ICD-10-CM

## 2022-12-14 DIAGNOSIS — Z01.818 PREOP GENERAL PHYSICAL EXAM: Primary | ICD-10-CM

## 2022-12-14 PROCEDURE — 90471 IMMUNIZATION ADMIN: CPT | Performed by: FAMILY MEDICINE

## 2022-12-14 PROCEDURE — 99204 OFFICE O/P NEW MOD 45 MIN: CPT | Mod: 25 | Performed by: FAMILY MEDICINE

## 2022-12-14 PROCEDURE — 90686 IIV4 VACC NO PRSV 0.5 ML IM: CPT | Performed by: FAMILY MEDICINE

## 2022-12-14 ASSESSMENT — PAIN SCALES - GENERAL: PAINLEVEL: NO PAIN (0)

## 2022-12-14 NOTE — H&P (VIEW-ONLY)
[address] [address]  Phone: [phone]  Primary Provider: Mai Fisher  Pre-op Performing Provider: AYO CHEN      PREOPERATIVE EVALUATION:  Today's date: 12/14/2022    Iker Berrios is a 27 year old male who presents for a preoperative evaluation.    Surgical Information:  Surgery/Procedure: tonsillectomyn  Surgery Location: [place]   Surgeon: Rick  Surgery Date: 12/20/22  Time of Surgery: 8:45am  Where patient plans to recover: At home with family  Fax number for surgical facility: Note does not need to be faxed, will be available electronically in Epic.    Type of Anesthesia Anticipated: General      ICD-10-CM    1. Preop general physical exam  Z01.818       2. Recurrent streptococcal tonsillitis  J03.01       3. Human immunodeficiency virus I infection (H)  B20       4. Need for prophylactic vaccination and inoculation against influenza  Z23         New to this provider and clinic  History of HIV on medication, stable count.  Sees ID specialist, with recurrent tonsillitis ENT recommendation for tonsillectomy  No acute symptoms or concerns, cleared for surgery      Subjective     HPI related to upcoming procedure: patient new tot his provider and clinic here for preoperative clearance visit.  He has history of recurrent tonsillitis and peritonsillar abscess.  He has had at least 3 positive tests in the last 3 years  He was evaluated by  ENT and recommended to have Tonsillectomy.      Stable HIV infection on Triumeq, sees ID specialist    Preop Questions 12/14/2022   1. Have you ever had a heart attack or stroke? No   2. Have you ever had surgery on your heart or blood vessels, such as a stent placement, a coronary artery bypass, or surgery on an artery in your head, neck, heart, or legs? No   3. Do you have chest pain with activity? No   4. Do you have a history of  heart failure? No   5. Do you currently have a  cold, bronchitis or symptoms of other infection? No   6. Do you have a cough, shortness of breath, or wheezing? No   7. Do you or anyone in your family have previous history of blood clots? No   8. Do you or does anyone in your family have a serious bleeding problem such as prolonged bleeding following surgeries or cuts? No   9. Have you ever had problems with anemia or been told to take iron pills? No   10. Have you had any abnormal blood loss such as black, tarry or bloody stools? No   11. Have you ever had a blood transfusion? YES -2015, after right femur sugery   11a. Have you ever had a transfusion reaction? No   12. Are you willing to have a blood transfusion if it is medically needed before, during, or after your surgery? Yes   13. Have you or any of your relatives ever had problems with anesthesia? No   14. Do you have sleep apnea, excessive snoring or daytime drowsiness? No   15. Do you have any artifical heart valves or other implanted medical devices like a pacemaker, defibrillator, or continuous glucose monitor? No   16. Do you have artificial joints? No   17. Are you allergic to latex? No     Health Care Directive:  Patient does not have a Health Care Directive or Living Will: Discussed advance care planning with patient; however, patient declined at this time.    Preoperative Review of :   reviewed - no record of controlled substances prescribed.      Status of Chronic Conditions:  See problem list for active medical problems.  Problems all longstanding and stable, except as noted/documented.  See ROS for pertinent symptoms related to these conditions.      Review of Systems  CONSTITUTIONAL: NEGATIVE for fever, chills, change in weight  INTEGUMENTARY/SKIN: NEGATIVE for worrisome rashes, moles or lesions  EYES: NEGATIVE for vision changes or irritation  ENT/MOUTH: NEGATIVE for ear, mouth and throat problems  RESP: NEGATIVE for significant cough or SOB  CV: NEGATIVE for chest pain, palpitations or  "peripheral edema  GI: NEGATIVE for nausea, abdominal pain, heartburn, or change in bowel habits  : NEGATIVE for frequency, dysuria, or hematuria  MUSCULOSKELETAL: NEGATIVE for significant arthralgias or myalgia  NEURO: NEGATIVE for weakness, dizziness or paresthesias  ENDOCRINE: NEGATIVE for temperature intolerance, skin/hair changes  HEME: NEGATIVE for bleeding problems  PSYCHIATRIC: NEGATIVE for changes in mood or affect    Patient Active Problem List    Diagnosis Date Noted     Recurrent streptococcal tonsillitis 10/13/2022     Priority: Medium     Added automatically from request for surgery [id number]       Gonorrhea 02/07/2022     Priority: Medium     History of depression 04/17/2019     Priority: Medium     Human immunodeficiency virus I infection (H) 06/27/2018     Priority: Medium     Diagnosed: 6/19/2018  CD4 at diagnosis: 783        Past Medical History:   Diagnosis Date     Human immunodeficiency virus I infection (H) 06/19/2018    Likely infected 3/18.  On antiretroviral therapy with Triumeq since 7/9/18.  Pre-treatment HIV viral load 2,654 copies/ml.  Scott CD4+ cell count 763.     Past Surgical History:   Procedure Laterality Date     COSMETIC SURGERY       none       ORTHOPEDIC SURGERY       Current Outpatient Medications   Medication Sig Dispense Refill     TRIUMEQ 600- MG per tablet TAKE 1 TABLET BY MOUTH EVERY DAY 30 tablet 11       No Known Allergies     Social History     Tobacco Use     Smoking status: Never     Smokeless tobacco: Never   Substance Use Topics     Alcohol use: Yes     Comment: mixed, weekly     Family History   Problem Relation Age of Onset     Diabetes Maternal Grandmother      Asthma No family hx of      C.A.D. No family hx of      Hypertension No family hx of      Cerebrovascular Disease No family hx of      Cancer No family hx of      History   Drug Use No         Objective     /70   Pulse 88   Temp 98.1  F (36.7  C) (Oral)   Resp 16   Ht 1.778 m (5' 10\")   " Wt 105.7 kg (233 lb)   SpO2 100%   BMI 33.43 kg/m      Physical Exam    GENERAL APPEARANCE: healthy, alert and no distress     EYES: EOMI,  PERRL     HENT: ear canals and TM's normal and nose and mouth without ulcers or lesions     NECK: no adenopathy, no asymmetry, masses, or scars and thyroid normal to palpation     RESP: lungs clear to auscultation - no rales, rhonchi or wheezes     CV: regular rates and rhythm, normal S1 S2, no S3 or S4 and no murmur, click or rub     ABDOMEN:  soft, nontender, no HSM or masses and bowel sounds normal     MS: extremities normal- no gross deformities noted, no evidence of inflammation in joints, FROM in all extremities.     SKIN: no suspicious lesions or rashes     NEURO: Normal strength and tone, sensory exam grossly normal, mentation intact and speech normal     PSYCH: mentation appears normal. and affect normal/bright     LYMPHATICS: No cervical adenopathy    Recent Labs   Lab Test 08/01/22  0406 07/20/22  1504   HGB 13.0* 13.9    303    141   POTASSIUM 3.7 4.1   CR 1.33* 1.37*        Diagnostics:  No labs were ordered during this visit.   No EKG required for low risk surgery (cataract, skin procedure, breast biopsy, etc).    Revised Cardiac Risk Index (RCRI):  The patient has the following serious cardiovascular risks for perioperative complications:   - No serious cardiac risks = 0 points     RCRI Interpretation: 0 points: Class I (very low risk - 0.4% complication rate)           Signed Electronically by: Nirmal Blas DO  Copy of this evaluation report is provided to requesting physician.

## 2022-12-14 NOTE — PROGRESS NOTES
[address] [address]  Phone: [phone]  Primary Provider: Mai Fisher  Pre-op Performing Provider: AYO CHEN      PREOPERATIVE EVALUATION:  Today's date: 12/14/2022    Iker Berrios is a 27 year old male who presents for a preoperative evaluation.    Surgical Information:  Surgery/Procedure: tonsillectomyn  Surgery Location: [facility]   Surgeon: Rick  Surgery Date: 12/20/22  Time of Surgery: 8:45am  Where patient plans to recover: At home with family  Fax number for surgical facility: Note does not need to be faxed, will be available electronically in Epic.    Type of Anesthesia Anticipated: General      ICD-10-CM    1. Preop general physical exam  Z01.818       2. Recurrent streptococcal tonsillitis  J03.01       3. Human immunodeficiency virus I infection (H)  B20       4. Need for prophylactic vaccination and inoculation against influenza  Z23         New to this provider and clinic  History of HIV on medication, stable count.  Sees ID specialist, with recurrent tonsillitis ENT recommendation for tonsillectomy  No acute symptoms or concerns, cleared for surgery      Subjective     HPI related to upcoming procedure: patient new tot his provider and clinic here for preoperative clearance visit.  He has history of recurrent tonsillitis and peritonsillar abscess.  He has had at least 3 positive tests in the last 3 years  He was evaluated by  ENT and recommended to have Tonsillectomy.      Stable HIV infection on Triumeq, sees ID specialist    Preop Questions 12/14/2022   1. Have you ever had a heart attack or stroke? No   2. Have you ever had surgery on your heart or blood vessels, such as a stent placement, a coronary artery bypass, or surgery on an artery in your head, neck, heart, or legs? No   3. Do you have chest pain with activity? No   4. Do you have a history of  heart failure? No   5. Do you currently have a  cold, bronchitis or symptoms of other infection? No   6. Do you have a cough, shortness of breath, or wheezing? No   7. Do you or anyone in your family have previous history of blood clots? No   8. Do you or does anyone in your family have a serious bleeding problem such as prolonged bleeding following surgeries or cuts? No   9. Have you ever had problems with anemia or been told to take iron pills? No   10. Have you had any abnormal blood loss such as black, tarry or bloody stools? No   11. Have you ever had a blood transfusion? YES -2015, after right femur sugery   11a. Have you ever had a transfusion reaction? No   12. Are you willing to have a blood transfusion if it is medically needed before, during, or after your surgery? Yes   13. Have you or any of your relatives ever had problems with anesthesia? No   14. Do you have sleep apnea, excessive snoring or daytime drowsiness? No   15. Do you have any artifical heart valves or other implanted medical devices like a pacemaker, defibrillator, or continuous glucose monitor? No   16. Do you have artificial joints? No   17. Are you allergic to latex? No     Health Care Directive:  Patient does not have a Health Care Directive or Living Will: Discussed advance care planning with patient; however, patient declined at this time.    Preoperative Review of :   reviewed - no record of controlled substances prescribed.      Status of Chronic Conditions:  See problem list for active medical problems.  Problems all longstanding and stable, except as noted/documented.  See ROS for pertinent symptoms related to these conditions.      Review of Systems  CONSTITUTIONAL: NEGATIVE for fever, chills, change in weight  INTEGUMENTARY/SKIN: NEGATIVE for worrisome rashes, moles or lesions  EYES: NEGATIVE for vision changes or irritation  ENT/MOUTH: NEGATIVE for ear, mouth and throat problems  RESP: NEGATIVE for significant cough or SOB  CV: NEGATIVE for chest pain, palpitations or  "peripheral edema  GI: NEGATIVE for nausea, abdominal pain, heartburn, or change in bowel habits  : NEGATIVE for frequency, dysuria, or hematuria  MUSCULOSKELETAL: NEGATIVE for significant arthralgias or myalgia  NEURO: NEGATIVE for weakness, dizziness or paresthesias  ENDOCRINE: NEGATIVE for temperature intolerance, skin/hair changes  HEME: NEGATIVE for bleeding problems  PSYCHIATRIC: NEGATIVE for changes in mood or affect    Patient Active Problem List    Diagnosis Date Noted     Recurrent streptococcal tonsillitis 10/13/2022     Priority: Medium     Added automatically from request for surgery [id number]       Gonorrhea 02/07/2022     Priority: Medium     History of depression 04/17/2019     Priority: Medium     Human immunodeficiency virus I infection (H) 06/27/2018     Priority: Medium     Diagnosed: 6/19/2018  CD4 at diagnosis: 783        Past Medical History:   Diagnosis Date     Human immunodeficiency virus I infection (H) 06/19/2018    Likely infected 3/18.  On antiretroviral therapy with Triumeq since 7/9/18.  Pre-treatment HIV viral load 2,654 copies/ml.  Scott CD4+ cell count 763.     Past Surgical History:   Procedure Laterality Date     COSMETIC SURGERY       none       ORTHOPEDIC SURGERY       Current Outpatient Medications   Medication Sig Dispense Refill     TRIUMEQ 600- MG per tablet TAKE 1 TABLET BY MOUTH EVERY DAY 30 tablet 11       No Known Allergies     Social History     Tobacco Use     Smoking status: Never     Smokeless tobacco: Never   Substance Use Topics     Alcohol use: Yes     Comment: mixed, weekly     Family History   Problem Relation Age of Onset     Diabetes Maternal Grandmother      Asthma No family hx of      C.A.D. No family hx of      Hypertension No family hx of      Cerebrovascular Disease No family hx of      Cancer No family hx of      History   Drug Use No         Objective     /70   Pulse 88   Temp 98.1  F (36.7  C) (Oral)   Resp 16   Ht 1.778 m (5' 10\")   " Wt 105.7 kg (233 lb)   SpO2 100%   BMI 33.43 kg/m      Physical Exam    GENERAL APPEARANCE: healthy, alert and no distress     EYES: EOMI,  PERRL     HENT: ear canals and TM's normal and nose and mouth without ulcers or lesions     NECK: no adenopathy, no asymmetry, masses, or scars and thyroid normal to palpation     RESP: lungs clear to auscultation - no rales, rhonchi or wheezes     CV: regular rates and rhythm, normal S1 S2, no S3 or S4 and no murmur, click or rub     ABDOMEN:  soft, nontender, no HSM or masses and bowel sounds normal     MS: extremities normal- no gross deformities noted, no evidence of inflammation in joints, FROM in all extremities.     SKIN: no suspicious lesions or rashes     NEURO: Normal strength and tone, sensory exam grossly normal, mentation intact and speech normal     PSYCH: mentation appears normal. and affect normal/bright     LYMPHATICS: No cervical adenopathy    Recent Labs   Lab Test 08/01/22  0406 07/20/22  1504   HGB 13.0* 13.9    303    141   POTASSIUM 3.7 4.1   CR 1.33* 1.37*        Diagnostics:  No labs were ordered during this visit.   No EKG required for low risk surgery (cataract, skin procedure, breast biopsy, etc).    Revised Cardiac Risk Index (RCRI):  The patient has the following serious cardiovascular risks for perioperative complications:   - No serious cardiac risks = 0 points     RCRI Interpretation: 0 points: Class I (very low risk - 0.4% complication rate)           Signed Electronically by: Nirmal Blas DO  Copy of this evaluation report is provided to requesting physician.

## 2022-12-14 NOTE — PATIENT INSTRUCTIONS
Flu shot today  Nice to meet you   Good with the surgery    Patient Education     For informational purposes only. Not to replace the advice of your health care provider. Copyright   ,  [email or web address] [facility]. All rights reserved. Clinically reviewed by Jen Carrillo MD. [email or web address] [id number] - REV .  Preparing for Your Surgery  Getting started  A nurse will call you to review your health history and instructions. They will give you an arrival time based on your scheduled surgery time. Please be ready to share:  Your doctor's clinic name and phone number  Your medical, surgical, and anesthesia history  A list of allergies and sensitivities  A list of medicines, including herbal treatments and over-the-counter drugs  Whether the patient has a legal guardian (ask how to send us the papers in advance)  Please tell us if you're pregnant--or if there's any chance you might be pregnant. Some surgeries may injure a fetus (unborn baby), so they require a pregnancy test. Surgeries that are safe for a fetus don't always need a test, and you can choose whether to have one.   If you have a child who's having surgery, please ask for a copy of Preparing for Your Child's Surgery.    Preparing for surgery  Within 10 to 30 days of surgery: Have a pre-op exam (sometimes called an H&P, or History and Physical). This can be done at a clinic or pre-operative center.  If you're having a , you may not need this exam. Talk to your care team.  At your pre-op exam, talk to your care team about all medicines you take. If you need to stop any medicines before surgery, ask when to start taking them again.  We do this for your safety. Many medicines can make you bleed too much during surgery. Some change how well surgery (anesthesia) drugs work.  Call your insurance company to let them know you're having surgery. (If you don't have insurance, call [phone].)  Call your clinic if there's any change in your health. This  includes signs of a cold or flu (sore throat, runny nose, cough, rash, fever). It also includes a scrape or scratch near the surgery site.  If you have questions on the day of surgery, call your hospital or surgery center.  Eating and drinking guidelines  For your safety: Unless your surgeon tells you otherwise, follow the guidelines below.  Eat and drink as usual until 8 hours before you arrive for surgery. After that, no food or milk.  Drink clear liquids until 2 hours before you arrive. These are liquids you can see through, like water, Gatorade, and Propel Water. They also include plain black coffee and tea (no cream or milk), candy, and breath mints. You can spit out gum when you arrive.  If you drink alcohol: Stop drinking it the night before surgery.  If your care team tells you to take medicine on the morning of surgery, it's okay to take it with a sip of water.  Preventing infection  Shower or bathe the night before and morning of your surgery. Follow the instructions your clinic gave you. (If no instructions, use regular soap.)  Don't shave or clip hair near your surgery site. We'll remove the hair if needed.  Don't smoke or vape the morning of surgery. You may chew nicotine gum up to 2 hours before surgery. A nicotine patch is okay.  Note: Some surgeries require you to completely quit smoking and nicotine. Check with your surgeon.  Your care team will make every effort to keep you safe from infection. We will:  Clean our hands often with soap and water (or an alcohol-based hand rub).  Clean the skin at your surgery site with a special soap that kills germs.  Give you a special gown to keep you warm. (Cold raises the risk of infection.)  Wear special hair covers, masks, gowns and gloves during surgery.  Give antibiotic medicine, if prescribed. Not all surgeries need antibiotics.  What to bring on the day of surgery  Photo ID and insurance card  Copy of your health care directive, if you have one  Glasses  and hearing aids (bring cases)  You can't wear contacts during surgery  Inhaler and eye drops, if you use them (tell us about these when you arrive)  CPAP machine or breathing device, if you use them  A few personal items, if spending the night  If you have . . .  A pacemaker, ICD (cardiac defibrillator) or other implant: Bring the ID card.  An implanted stimulator: Bring the remote control.  A legal guardian: Bring a copy of the certified (court-stamped) guardianship papers.  Please remove any jewelry, including body piercings. Leave jewelry and other valuables at home.  If you're going home the day of surgery  You must have a responsible adult drive you home. They should stay with you overnight as well.  If you don't have someone to stay with you, and you aren't safe to go home alone, we may keep you overnight. Insurance often won't pay for this.  After surgery  If it's hard to control your pain or you need more pain medicine, please call your surgeon's office.  Questions?   If you have any questions for your care team, list them here: _________________________________________________________________________________________________________________________________________________________________________ ____________________________________ ____________________________________ ____________________________________

## 2022-12-19 ENCOUNTER — ANESTHESIA EVENT (OUTPATIENT)
Dept: SURGERY | Facility: AMBULATORY SURGERY CENTER | Age: 27
End: 2022-12-19
Payer: COMMERCIAL

## 2022-12-19 NOTE — ANESTHESIA PREPROCEDURE EVALUATION
Anesthesia Pre-Procedure Evaluation    Patient: Iker Berrios   MRN: [id number] : 1995        Procedure : Procedure(s):  TONSILLECTOMY          Past Medical History:   Diagnosis Date     Human immunodeficiency virus I infection (H) 2018    Likely infected 3/18.  On antiretroviral therapy with Triumeq since 18.  Pre-treatment HIV viral load 2,654 copies/ml.  Scott CD4+ cell count 763.      Past Surgical History:   Procedure Laterality Date     COSMETIC SURGERY       none       ORTHOPEDIC SURGERY        No Known Allergies   Social History     Tobacco Use     Smoking status: Never     Smokeless tobacco: Never   Substance Use Topics     Alcohol use: Yes     Comment: mixed, weekly      Wt Readings from Last 1 Encounters:   22 105.7 kg (233 lb)           Physical Exam    Airway        Mallampati: I   TM distance: > 3 FB   Neck ROM: full   Mouth opening: > 3 cm    Respiratory Devices and Support         Dental  no notable dental history         Cardiovascular   cardiovascular exam normal          Pulmonary   pulmonary exam normal                OUTSIDE LABS:  CBC:   Lab Results   Component Value Date    WBC 9.6 2022    WBC 5.0 2022    HGB 13.0 (L) 2022    HGB 13.9 2022    HCT 41.8 2022    HCT 44.7 2022     2022     2022     BMP:   Lab Results   Component Value Date     2022     2022    POTASSIUM 3.7 2022    POTASSIUM 4.1 2022    CHLORIDE 104 2022    CHLORIDE 109 2022    CO2 25 2022    CO2 26 2022    BUN 15.8 2022    BUN 19 2022    CR 1.33 (H) 2022    CR 1.37 (H) 2022    GLC 99 2022    GLC 87 2022     COAGS: No results found for: PTT, INR, FIBR  POC: No results found for: BGM, HCG, HCGS  HEPATIC:   Lab Results   Component Value Date    ALBUMIN 4.2 2022    PROTTOTAL 7.4 2022    ALT 34 2022    AST 34 2022    ALKPHOS 76  08/01/2022    BILITOTAL 0.4 08/01/2022     OTHER:   Lab Results   Component Value Date    MARIELY 9.0 08/01/2022    LIPASE 139 07/20/2022    AMYLASE 124 (H) 06/25/2018       Anesthesia Plan    ASA Status:  2   NPO Status:  NPO Appropriate    Anesthesia Type: General.     - Airway: ETT   Induction: Intravenous, Propofol.   Maintenance: Balanced.        Consents    Anesthesia Plan(s) and associated risks, benefits, and realistic alternatives discussed. Questions answered and patient/representative(s) expressed understanding.    - Discussed:     - Discussed with:  Patient      - Extended Intubation/Ventilatory Support Discussed: No.      - Patient is DNR/DNI Status: No    Use of blood products discussed: No .     Postoperative Care    Pain management: IV analgesics, Oral pain medications, Multi-modal analgesia.   PONV prophylaxis: Ondansetron (or other 5HT-3), Dexamethasone or Solumedrol, Background Propofol Infusion     Comments:                Darryn Garcia MD

## 2022-12-20 ENCOUNTER — HOSPITAL ENCOUNTER (OUTPATIENT)
Facility: AMBULATORY SURGERY CENTER | Age: 27
Discharge: HOME | End: 2022-12-20
Attending: OTOLARYNGOLOGY | Admitting: OTOLARYNGOLOGY
Payer: COMMERCIAL

## 2022-12-20 ENCOUNTER — ANESTHESIA (OUTPATIENT)
Dept: SURGERY | Facility: AMBULATORY SURGERY CENTER | Age: 27
End: 2022-12-20
Payer: COMMERCIAL

## 2022-12-20 VITALS
HEART RATE: 79 BPM | TEMPERATURE: 97.6 F | SYSTOLIC BLOOD PRESSURE: 124 MMHG | RESPIRATION RATE: 12 BRPM | DIASTOLIC BLOOD PRESSURE: 70 MMHG | OXYGEN SATURATION: 95 %

## 2022-12-20 DIAGNOSIS — R11.0 POSTOPERATIVE NAUSEA: ICD-10-CM

## 2022-12-20 DIAGNOSIS — Z98.890 POSTOPERATIVE NAUSEA: ICD-10-CM

## 2022-12-20 DIAGNOSIS — G89.18 POSTOPERATIVE PAIN: Primary | ICD-10-CM

## 2022-12-20 DIAGNOSIS — J03.01 RECURRENT STREPTOCOCCAL TONSILLITIS: ICD-10-CM

## 2022-12-20 PROCEDURE — 42826 REMOVAL OF TONSILS: CPT | Performed by: OTOLARYNGOLOGY

## 2022-12-20 PROCEDURE — G8907 PT DOC NO EVENTS ON DISCHARG: HCPCS

## 2022-12-20 PROCEDURE — G8918 PT W/O PREOP ORDER IV AB PRO: HCPCS

## 2022-12-20 PROCEDURE — 88304 TISSUE EXAM BY PATHOLOGIST: CPT | Mod: 26 | Performed by: PATHOLOGY

## 2022-12-20 PROCEDURE — 42826 REMOVAL OF TONSILS: CPT

## 2022-12-20 RX ORDER — ONDANSETRON 2 MG/ML
4 INJECTION INTRAMUSCULAR; INTRAVENOUS EVERY 30 MIN PRN
Status: DISCONTINUED | OUTPATIENT
Start: 2022-12-20 | End: 2022-12-21 | Stop reason: HOSPADM

## 2022-12-20 RX ORDER — OXYCODONE HYDROCHLORIDE 5 MG/1
10 TABLET ORAL EVERY 4 HOURS PRN
Status: DISCONTINUED | OUTPATIENT
Start: 2022-12-20 | End: 2022-12-21 | Stop reason: HOSPADM

## 2022-12-20 RX ORDER — FENTANYL CITRATE 50 UG/ML
25 INJECTION, SOLUTION INTRAMUSCULAR; INTRAVENOUS
Status: DISCONTINUED | OUTPATIENT
Start: 2022-12-20 | End: 2022-12-21 | Stop reason: HOSPADM

## 2022-12-20 RX ORDER — HYDRALAZINE HYDROCHLORIDE 20 MG/ML
2.5-5 INJECTION INTRAMUSCULAR; INTRAVENOUS EVERY 10 MIN PRN
Status: DISCONTINUED | OUTPATIENT
Start: 2022-12-20 | End: 2022-12-21 | Stop reason: HOSPADM

## 2022-12-20 RX ORDER — OXYCODONE HYDROCHLORIDE 5 MG/1
5 TABLET ORAL EVERY 4 HOURS PRN
Status: DISCONTINUED | OUTPATIENT
Start: 2022-12-20 | End: 2022-12-21 | Stop reason: HOSPADM

## 2022-12-20 RX ORDER — BUPIVACAINE HYDROCHLORIDE 2.5 MG/ML
INJECTION, SOLUTION INFILTRATION; PERINEURAL PRN
Status: DISCONTINUED | OUTPATIENT
Start: 2022-12-20 | End: 2022-12-20 | Stop reason: HOSPADM

## 2022-12-20 RX ORDER — ACETAMINOPHEN 325 MG/1
975 TABLET ORAL ONCE
Status: COMPLETED | OUTPATIENT
Start: 2022-12-20 | End: 2022-12-20

## 2022-12-20 RX ORDER — ONDANSETRON 4 MG/1
4 TABLET, ORALLY DISINTEGRATING ORAL EVERY 30 MIN PRN
Status: DISCONTINUED | OUTPATIENT
Start: 2022-12-20 | End: 2022-12-21 | Stop reason: HOSPADM

## 2022-12-20 RX ORDER — FENTANYL CITRATE 50 UG/ML
50 INJECTION, SOLUTION INTRAMUSCULAR; INTRAVENOUS EVERY 5 MIN PRN
Status: DISCONTINUED | OUTPATIENT
Start: 2022-12-20 | End: 2022-12-21 | Stop reason: HOSPADM

## 2022-12-20 RX ORDER — ONDANSETRON 2 MG/ML
INJECTION INTRAMUSCULAR; INTRAVENOUS PRN
Status: DISCONTINUED | OUTPATIENT
Start: 2022-12-20 | End: 2022-12-20

## 2022-12-20 RX ORDER — PROPOFOL 10 MG/ML
INJECTION, EMULSION INTRAVENOUS PRN
Status: DISCONTINUED | OUTPATIENT
Start: 2022-12-20 | End: 2022-12-20

## 2022-12-20 RX ORDER — LIDOCAINE 40 MG/G
CREAM TOPICAL
Status: DISCONTINUED | OUTPATIENT
Start: 2022-12-20 | End: 2022-12-21 | Stop reason: HOSPADM

## 2022-12-20 RX ORDER — HYDROMORPHONE HYDROCHLORIDE 2 MG/1
2 TABLET ORAL EVERY 4 HOURS PRN
Qty: 30 TABLET | Refills: 0 | Status: SHIPPED | OUTPATIENT
Start: 2022-12-20 | End: 2022-12-25

## 2022-12-20 RX ORDER — MAGNESIUM HYDROXIDE 1200 MG/15ML
LIQUID ORAL PRN
Status: DISCONTINUED | OUTPATIENT
Start: 2022-12-20 | End: 2022-12-20 | Stop reason: HOSPADM

## 2022-12-20 RX ORDER — ONDANSETRON 4 MG/1
4 TABLET, ORALLY DISINTEGRATING ORAL EVERY 8 HOURS PRN
Qty: 12 TABLET | Refills: 0 | Status: SHIPPED | OUTPATIENT
Start: 2022-12-20 | End: 2023-03-29

## 2022-12-20 RX ORDER — METOPROLOL TARTRATE 1 MG/ML
1-2 INJECTION, SOLUTION INTRAVENOUS EVERY 5 MIN PRN
Status: DISCONTINUED | OUTPATIENT
Start: 2022-12-20 | End: 2022-12-21 | Stop reason: HOSPADM

## 2022-12-20 RX ORDER — DEXAMETHASONE SODIUM PHOSPHATE 10 MG/ML
10 INJECTION, SOLUTION INTRAMUSCULAR; INTRAVENOUS ONCE
Status: COMPLETED | OUTPATIENT
Start: 2022-12-20 | End: 2022-12-20

## 2022-12-20 RX ORDER — SODIUM CHLORIDE, SODIUM LACTATE, POTASSIUM CHLORIDE, CALCIUM CHLORIDE 600; 310; 30; 20 MG/100ML; MG/100ML; MG/100ML; MG/100ML
INJECTION, SOLUTION INTRAVENOUS CONTINUOUS
Status: DISCONTINUED | OUTPATIENT
Start: 2022-12-20 | End: 2022-12-21 | Stop reason: HOSPADM

## 2022-12-20 RX ORDER — METHYLPREDNISOLONE 4 MG
TABLET, DOSE PACK ORAL
Qty: 21 TABLET | Refills: 0 | Status: SHIPPED | OUTPATIENT
Start: 2022-12-20 | End: 2023-03-29

## 2022-12-20 RX ORDER — FENTANYL CITRATE 50 UG/ML
25 INJECTION, SOLUTION INTRAMUSCULAR; INTRAVENOUS EVERY 5 MIN PRN
Status: DISCONTINUED | OUTPATIENT
Start: 2022-12-20 | End: 2022-12-21 | Stop reason: HOSPADM

## 2022-12-20 RX ORDER — LIDOCAINE HYDROCHLORIDE 20 MG/ML
INJECTION, SOLUTION INFILTRATION; PERINEURAL PRN
Status: DISCONTINUED | OUTPATIENT
Start: 2022-12-20 | End: 2022-12-20

## 2022-12-20 RX ADMIN — Medication 0.3 MG: at 08:33

## 2022-12-20 RX ADMIN — Medication 50 MG: at 08:33

## 2022-12-20 RX ADMIN — SODIUM CHLORIDE, SODIUM LACTATE, POTASSIUM CHLORIDE, CALCIUM CHLORIDE: 600; 310; 30; 20 INJECTION, SOLUTION INTRAVENOUS at 08:24

## 2022-12-20 RX ADMIN — ONDANSETRON 4 MG: 2 INJECTION INTRAMUSCULAR; INTRAVENOUS at 08:45

## 2022-12-20 RX ADMIN — Medication 0.2 MG: at 09:06

## 2022-12-20 RX ADMIN — ACETAMINOPHEN 975 MG: 325 TABLET ORAL at 07:54

## 2022-12-20 RX ADMIN — LIDOCAINE HYDROCHLORIDE 100 MG: 20 INJECTION, SOLUTION INFILTRATION; PERINEURAL at 08:33

## 2022-12-20 RX ADMIN — PROPOFOL 100 MG: 10 INJECTION, EMULSION INTRAVENOUS at 08:35

## 2022-12-20 RX ADMIN — Medication 0.2 MG: at 08:47

## 2022-12-20 RX ADMIN — PROPOFOL 30 MCG/KG/MIN: 10 INJECTION, EMULSION INTRAVENOUS at 08:38

## 2022-12-20 RX ADMIN — PROPOFOL 200 MG: 10 INJECTION, EMULSION INTRAVENOUS at 08:33

## 2022-12-20 RX ADMIN — DEXAMETHASONE SODIUM PHOSPHATE 10 MG: 10 INJECTION, SOLUTION INTRAMUSCULAR; INTRAVENOUS at 08:43

## 2022-12-20 NOTE — ANESTHESIA PROCEDURE NOTES
Airway       Patient location during procedure: OR       Procedure Start/Stop Times: 12/20/2022 8:37 AM  Staff -        CRNA: Mundo Rojo APRN CRNA       Performed By: CRNA  Consent for Airway        Urgency: elective  Indications and Patient Condition       Indications for airway management: nani-procedural       Induction type:intravenous       Mask difficulty assessment: 1 - vent by mask    Final Airway Details       Final airway type: endotracheal airway       Successful airway: Oral, ANISA and ETT - single  Endotracheal Airway Details        ETT size (mm): 7.5       Cuffed: yes       Successful intubation technique: direct laryngoscopy       DL Blade Type: Wilson 2       Grade View of Cords: 1 (open and clear)       Adjucts: stylet       Position: Center       Measured from: gums/teeth       Secured at (cm): 21       Bite block used: Oral Airway (emergence)    Post intubation assessment        Placement verified by: capnometry, equal breath sounds and chest rise        Number of attempts at approach: 1       Secured with: cloth tape       Ease of procedure: easy       Dentition: Intact and Unchanged    Medication(s) Administered   Medication Administration Time: 12/20/2022 8:37 AM

## 2022-12-20 NOTE — OP NOTE
OTOLARYNGOLOGY OPERATIVE NOTE    SURGEON:  Dipesh Cabrera MD      ASSISTANT: NONE     PREOPERATIVE DIAGNOSIS:  Chronic tonsillitis .      POSTOPERATIVE DIAGNOSIS:  Chronic tonsillitis .      PROCEDURE:  Tonsillectomy .      INDICATIONS:  As above.      SURGICAL FINDINGS:  AS ABOVE    Date: 12/20/2022    BRIEF HISTORY: Patient is an 27 year old year old with a history of chronic tonsillitis    despite medical therapy.  Family understands.  The patient understands the risks and benefits of the surgery, alternatives, limitations, complications including but not limited to bleeding, infection, nasopharyngeal stenosis, oropharyngeal stenosis, bleeding severe enough to necessitate reoperation, risks related to anesthesia amongst others.  They wish surgery and now fully agree to it.        DESCRIPTION OF PROCEDURE:  The patient was taken to the OR, placed under general endotracheal anesthetic, appropriately positioned, prepped and draped.  At this point, we appreciate tonsils being 3+ with multiple tonsil stones.  We injected the anterior and posterior tonsillar pillars with 0.25% plain Marcaine.  The left tonsil was engaged in the superior pole, retracted medially.  Using Arthrocare Coblator tip at a setting of 6 with continuous irrigation, an incision was made in the anterior pillar.  We defined the capsule, staying above it.  We carefully dissected the tonsil while controlling hemostasis with a Coblator tip, provided bipolar cautery.  On the right side, we did the same.  The tonsil was engaged, retracted medially.  We made an incision in the anterior pillar, defined the capsule, staying above it.  Dissected the tonsil while controlling hemostasis with a Coblator tip, provided bipolar cautery.  The tonsil was removed without difficulty.  Hemostasis was well controlled. Patient was extubated in the OR, taken to recovery in stable condition with less than 15 mL blood loss.         DIPESH CABRERA MD

## 2022-12-20 NOTE — INTERVAL H&P NOTE
"I have reviewed the surgical (or preoperative) H&P that is linked to this encounter, and examined the patient. There are no significant changes    Clinical Conditions Present on Arrival:  Clinically Significant Risk Factors Present on Admission                    # Obesity: Estimated body mass index is 33.43 kg/m  as calculated from the following:    Height as of 12/14/22: 1.778 m (5' 10\").    Weight as of 12/14/22: 105.7 kg (233 lb).       "

## 2022-12-20 NOTE — DISCHARGE INSTRUCTIONS
To contact Dr Cabrera call:  [phone]    Postoperative Care for Tonsillectomy (with or without adenoidectomy)    Recovery - There are a handful of issues that routinely occur during recover that should be anticipated during your recovery.    The pain and swelling almost always gets worse before it gets better, this is normal.  Usually it peaks 3 to 5 days after the surgery, and then begins improving at 7 to 8 days after surgery.  Of course, this is variable from person to person.  The only dietary restriction is avoidance of hard or crunchy things until I see you in follow up.  If it makes a noise when you bite it, it is too hard.  Although it is good to begin eating again from day one, it is not unusual to not eat for several days after the procedure.  The most important thing is staying hydrated.  Drink fluids with electrolytes if possible, such as sports drinks.  The pain medication you were sent home with can make some people very nauseated.  To minimize this, avoid taking it on an empty stomach, or take smaller does with greater frequency.  For example if your dose is 2 teaspoons every four hours, try taking one teaspoon every two hours, etc.  Antibiotic are sometimes given after surgery, not to prevent infection, but some research shows that it helps to decrease pain.  This is not absolutely proven, and therefore is not absolutely necessary.   Try to stay ahead of the pain.  In other words, do not wait for pain medication to completely wear off before taking more pain medicine.  Instead, take the medication every 4 to 6 hours, even if it requires setting an alarm clock at night.  This is especially helpful during the first 5 days.  The uvula ( the small hanging object in the back of your mouth) frequently swells up after tonsillectomy, but will go back to normal.  This swelling can temporarily cause the sensation of something being stuck in your throat, it will go away with recovery.  Also, because of  the arrangement of nerves under where the tonsils were, sharp ear pain is very common during recovery, and will also go away with recovery.   With adenoidectomy, a very strong and foul-smelling odor can occur about 4-7 days after surgery.  This fades rapidly, and unless there is an associated fever no antibiotics are necessary.  It is very common after tonsillectomy to experience ear pain. This is due to nerves on the side of the throat becoming inflamed, and causing the perception of sudden episodes of ear pain.  This can be controlled with the same pain medication given for the surgery.     Activity - Avoid heavy lifting (greater than 20 pounds), strenuous exercise, or extremely cold environments until the follow up appointment.  Also, try to sleep with your head elevated.  An irritated cough from the breathing tube is fairly normal after surgery.    Medications - Except blood thinners, almost all medication can be re-started after tonsillectomy.      Complications - Bleeding is by far the most common complication after tonsillectomy.  If there are a few small drops or streaks of blood in the saliva that then goes away, this can be conservatively watched.  Gentle gargling with the ice water can also help stop this minor bleeding.  However, if the bleeding is persistent, or heavy bleeding occurs, do not hesitate.  Go to the emergency room to be evaluated.    Follow up - I like to see my patients about 2 weeks after the procedure to make sure that everything is healing appropriately.  Occasionally, there can be some longer - lasting side effects of surgery such as abnormal tongue sensations, or unusual swallowing.  However, if everything is healing well, the 2 week postoperative visit is all that will be necessary.      What can I expect after Surgery?    It is common to have an upset stomach and vomiting during the first 24 hours after surgery.    Your  throat may be sore for two weeks, especially when eating. The  soreness may get better after a few days and then get worse again. Your  voice may change a little after surgery.    Ear pain is common, often when swallowing, because the ear and throat have a common sensory nerve. Jaw spasms, or uncontrollable movement of the jaw, may also occur and cause pain.    Neck soreness is common after an adenoidectomy and usually last about one week.    You will have bad breath for a few weeks because your throat is swollen, snoring is common after surgery but should go away after about two weeks.    How should I care for myself?    Drink plenty of liquids (at least 2 -3 ounces per hour). Keeping the throat moist decreases discomfort and prevents dehydration( a dangerous condition in which the body gets dried out.)    Give pain medication regularly within the limits directed by your doctor. Give it before bed and first thing after waking in the morning. Try to give the pain medication 30 minutes before meals to help make swallowing easier.    To prevent bleeding, avoid coughing, nose-blowing, clearing throat, and spitting. Wipe nose gently if needed. When sneezing, open the mouth and make a sound, to prevent pressure buildup.    Avoid coming in contact with people who have colds, flu, or infections.      What can I eat?    The day of surgery, give only cool, Clear liquids such as:    Apple Juice  Jell-o*  Bk-aid*  Popsicles*  Flat pop (remove bubbles)  Water    If you have an upset stomach, give small amounts often. Note: If   You vomit after drinking red liquids the vomit will be the same  color.    After the first day, when you want them add dairy and soft foods such as:  Ice cream  Milk shakes(use spoon)  Pudding  Smooth yogurt  Liquids are more important than food.    Be sure you are drinking a lot.    Add soft foods (foods without rough edges). See the chart for ideas. If a food is not on the list ask yourself:    Is it easy to chew? Is it free of coarse, rough, or crispy edges?   If the answer  is yes, you can probably eat it.    Be sure to cut foods very small and chew them well. Continue the soft diet for 2 weeks after surgery Avoid citrus fruits and juices   such as orange juice and lemonade, as they may sting your throat. Avoid foods that are hot in temperature or spicy hot.        May Eat Should not eat   - Soft bread  - Soggy waffles or   Ukrainian toast (no crusts).  Soaked in syrup  - Pancakes  - Scrambled or   poached egg   - Toast  - Crispy waffles  - Fried foods   - Oatmeal,or   Creamy cereal  - Soggy cold cereal  (soaked in milk   - Crunchy cold   cereal   - Soup  - Hot dogs  - Hamburgers  - Tender, moist  meat  - Pasta, noodles  - Spaghetti-Os  - Macaroni and  Cheese   - Tough, dry meat,  chicken or fish   - Milk  - Custard, pudding  - Ice cream  - Malts, shakes  - Yogurt (smooth)  - Cottage cheese   - Cookies  - Crackers  - Pretzels  - Chips  - Popcorn  - Nuts   - Sandwiches, (no crusts)  - Smooth peanut butter   and jelly  - Processed cheese  - Tuna - Grilled cheese  sandwiches   - Cooked vegetables  - Mashed potatoes - Raw vegetables   - Tomatoes   - Applesauce  - Bananas  - Canned fruits  - Watermelon with out  seeds - Citrus fruits  - Moist fresh fruits   - Juices (not citrus)  - Bk aid  - Flat pop (no bubbles)  - Jell-O - Citrus juices  - Pop with bubbles

## 2022-12-20 NOTE — ANESTHESIA CARE TRANSFER NOTE
Patient: Iker Berrios    Procedure: Procedure(s):  TONSILLECTOMY       Diagnosis: Recurrent streptococcal tonsillitis [J03.01]  Diagnosis Additional Information: No value filed.    Anesthesia Type:   General     Note:    Oropharynx: oropharynx clear of all foreign objects and spontaneously breathing  Level of Consciousness: drowsy and awake  Oxygen Supplementation: nasal cannula  Level of Supplemental Oxygen (L/min / FiO2): 3  Independent Airway: airway patency satisfactory and stable  Dentition: dentition unchanged  Vital Signs Stable: post-procedure vital signs reviewed and stable  Report to RN Given: handoff report given  Patient transferred to: PACU    Handoff Report: Identifed the Patient, Identified the Reponsible Provider, Reviewed the pertinent medical history, Discussed the surgical course, Reviewed Intra-OP anesthesia mangement and issues during anesthesia, Set expectations for post-procedure period and Allowed opportunity for questions and acknowledgement of understanding      Vitals:  Vitals Value Taken Time   BP     Temp     Pulse 91 12/20/22 0922   Resp 10 12/20/22 0922   SpO2 100 % 12/20/22 0922   Vitals shown include unvalidated device data.    Electronically Signed By: LUCINDA Diez CRNA  December 20, 2022  9:23 AM

## 2022-12-20 NOTE — ANESTHESIA POSTPROCEDURE EVALUATION
Patient: Iker Berrios    Procedure: Procedure(s):  TONSILLECTOMY       Anesthesia Type:  General    Note:  Disposition: Outpatient   Postop Pain Control: Uneventful            Sign Out: Well controlled pain   PONV:    Neuro/Psych: Uneventful            Sign Out: Acceptable/Baseline neuro status   Airway/Respiratory: Uneventful            Sign Out: Acceptable/Baseline resp. status   CV/Hemodynamics: Uneventful            Sign Out: Acceptable CV status; No obvious hypovolemia; No obvious fluid overload   Other NRE:    DID A NON-ROUTINE EVENT OCCUR?            Last vitals:  Vitals Value Taken Time   /70 12/20/22 0945   Temp 97.6  F (36.4  C) 12/20/22 0922   Pulse 83 12/20/22 0953   Resp 16 12/20/22 0953   SpO2 96 % 12/20/22 0953   Vitals shown include unvalidated device data.    Electronically Signed By: Darryn Garcia MD  December 20, 2022  10:25 AM

## 2022-12-25 LAB
PATH REPORT.COMMENTS IMP SPEC: NORMAL
PATH REPORT.FINAL DX SPEC: NORMAL
PATH REPORT.GROSS SPEC: NORMAL
PATH REPORT.MICROSCOPIC SPEC OTHER STN: NORMAL
PATH REPORT.RELEVANT HX SPEC: NORMAL
PHOTO IMAGE: NORMAL

## 2023-02-16 ENCOUNTER — TELEPHONE (OUTPATIENT)
Dept: INFECTIOUS DISEASES | Facility: CLINIC | Age: 28
End: 2023-02-16

## 2023-02-16 DIAGNOSIS — Z11.3 ROUTINE SCREENING FOR STI (SEXUALLY TRANSMITTED INFECTION): Primary | ICD-10-CM

## 2023-02-16 DIAGNOSIS — Z21 HUMAN IMMUNODEFICIENCY VIRUS I INFECTION (H): ICD-10-CM

## 2023-02-16 NOTE — TELEPHONE ENCOUNTER
Per [facility] Ambulatory Care Protocol, routine B20 lab orders entered as pt is due for them in order to monitor pt's disease state. Per [facility] Ambulatory Care Protocol pt is due for routine STI screening, is symptomatic for a sexually transmitted infection, or pt has known personal history of exposure to potentially hazardous body fluids, therefor lab orders entered today for STD testing.   Amber Mackey RN

## 2023-02-17 ENCOUNTER — ALLIED HEALTH/NURSE VISIT (OUTPATIENT)
Dept: INFECTIOUS DISEASES | Facility: CLINIC | Age: 28
End: 2023-02-17
Attending: INTERNAL MEDICINE
Payer: COMMERCIAL

## 2023-02-17 ENCOUNTER — LAB (OUTPATIENT)
Dept: LAB | Facility: CLINIC | Age: 28
End: 2023-02-17
Payer: COMMERCIAL

## 2023-02-17 DIAGNOSIS — Z11.3 ROUTINE SCREENING FOR STI (SEXUALLY TRANSMITTED INFECTION): ICD-10-CM

## 2023-02-17 DIAGNOSIS — Z21 HUMAN IMMUNODEFICIENCY VIRUS I INFECTION (H): ICD-10-CM

## 2023-02-17 LAB
ALBUMIN SERPL BCG-MCNC: 4.6 G/DL (ref 3.5–5.2)
ALP SERPL-CCNC: 68 U/L (ref 40–129)
ALT SERPL W P-5'-P-CCNC: 41 U/L (ref 10–50)
ANION GAP SERPL CALCULATED.3IONS-SCNC: 11 MMOL/L (ref 7–15)
AST SERPL W P-5'-P-CCNC: 38 U/L (ref 10–50)
BASOPHILS # BLD AUTO: 0 10E3/UL (ref 0–0.2)
BASOPHILS NFR BLD AUTO: 1 %
BILIRUB SERPL-MCNC: 0.3 MG/DL
BUN SERPL-MCNC: 18.2 MG/DL (ref 6–20)
CALCIUM SERPL-MCNC: 10.1 MG/DL (ref 8.6–10)
CHLORIDE SERPL-SCNC: 105 MMOL/L (ref 98–107)
CREAT SERPL-MCNC: 1.34 MG/DL (ref 0.67–1.17)
DEPRECATED HCO3 PLAS-SCNC: 23 MMOL/L (ref 22–29)
EOSINOPHIL # BLD AUTO: 0.1 10E3/UL (ref 0–0.7)
EOSINOPHIL NFR BLD AUTO: 1 %
ERYTHROCYTE [DISTWIDTH] IN BLOOD BY AUTOMATED COUNT: 14.1 % (ref 10–15)
GFR SERPL CREATININE-BSD FRML MDRD: 74 ML/MIN/1.73M2
GLUCOSE SERPL-MCNC: 80 MG/DL (ref 70–99)
HCT VFR BLD AUTO: 44.9 % (ref 40–53)
HGB BLD-MCNC: 13.8 G/DL (ref 13.3–17.7)
IMM GRANULOCYTES # BLD: 0.1 10E3/UL
IMM GRANULOCYTES NFR BLD: 1 %
LIPASE SERPL-CCNC: 34 U/L (ref 13–60)
LYMPHOCYTES # BLD AUTO: 2.3 10E3/UL (ref 0.8–5.3)
LYMPHOCYTES NFR BLD AUTO: 43 %
MCH RBC QN AUTO: 23.4 PG (ref 26.5–33)
MCHC RBC AUTO-ENTMCNC: 30.7 G/DL (ref 31.5–36.5)
MCV RBC AUTO: 76 FL (ref 78–100)
MONOCYTES # BLD AUTO: 0.4 10E3/UL (ref 0–1.3)
MONOCYTES NFR BLD AUTO: 7 %
NEUTROPHILS # BLD AUTO: 2.6 10E3/UL (ref 1.6–8.3)
NEUTROPHILS NFR BLD AUTO: 47 %
NRBC # BLD AUTO: 0 10E3/UL
NRBC BLD AUTO-RTO: 0 /100
PLATELET # BLD AUTO: 261 10E3/UL (ref 150–450)
POTASSIUM SERPL-SCNC: 4 MMOL/L (ref 3.4–5.3)
PROT SERPL-MCNC: 8.1 G/DL (ref 6.4–8.3)
RBC # BLD AUTO: 5.9 10E6/UL (ref 4.4–5.9)
SODIUM SERPL-SCNC: 139 MMOL/L (ref 136–145)
WBC # BLD AUTO: 5.5 10E3/UL (ref 4–11)

## 2023-02-17 PROCEDURE — 86359 T CELLS TOTAL COUNT: CPT | Mod: 90 | Performed by: PATHOLOGY

## 2023-02-17 PROCEDURE — 36415 COLL VENOUS BLD VENIPUNCTURE: CPT | Performed by: PATHOLOGY

## 2023-02-17 PROCEDURE — 87536 HIV-1 QUANT&REVRSE TRNSCRPJ: CPT | Mod: 90 | Performed by: PATHOLOGY

## 2023-02-17 PROCEDURE — 85025 COMPLETE CBC W/AUTO DIFF WBC: CPT | Performed by: PATHOLOGY

## 2023-02-17 PROCEDURE — 87491 CHLMYD TRACH DNA AMP PROBE: CPT | Mod: 90 | Performed by: PATHOLOGY

## 2023-02-17 PROCEDURE — 80053 COMPREHEN METABOLIC PANEL: CPT | Performed by: PATHOLOGY

## 2023-02-17 PROCEDURE — 83690 ASSAY OF LIPASE: CPT | Performed by: PATHOLOGY

## 2023-02-17 PROCEDURE — 86360 T CELL ABSOLUTE COUNT/RATIO: CPT | Mod: 90 | Performed by: PATHOLOGY

## 2023-02-17 PROCEDURE — 99000 SPECIMEN HANDLING OFFICE-LAB: CPT | Performed by: PATHOLOGY

## 2023-02-17 PROCEDURE — 87491 CHLMYD TRACH DNA AMP PROBE: CPT

## 2023-02-17 PROCEDURE — 87591 N.GONORRHOEAE DNA AMP PROB: CPT | Mod: 90 | Performed by: PATHOLOGY

## 2023-02-17 PROCEDURE — 86780 TREPONEMA PALLIDUM: CPT | Mod: 90 | Performed by: PATHOLOGY

## 2023-02-17 PROCEDURE — 87591 N.GONORRHOEAE DNA AMP PROB: CPT

## 2023-02-17 NOTE — NURSING NOTE
Throat and rectal swabs were obtained and sent to lab for results.     Marck Sams, EMT on 2/17/2023 at 3:10 PM

## 2023-02-18 LAB
C TRACH DNA SPEC QL NAA+PROBE: NEGATIVE
CD3 CELLS # BLD: 1799 CELLS/UL (ref 603–2990)
CD3 CELLS NFR BLD: 73 % (ref 49–84)
CD3+CD4+ CELLS # BLD: 1002 CELLS/UL (ref 441–2156)
CD3+CD4+ CELLS NFR BLD: 41 % (ref 28–63)
CD3+CD4+ CELLS/CD3+CD8+ CLL BLD: 1.35 % (ref 1.4–2.6)
CD3+CD8+ CELLS # BLD: 741 CELLS/UL (ref 125–1312)
CD3+CD8+ CELLS NFR BLD: 30 % (ref 10–40)
N GONORRHOEA DNA SPEC QL NAA+PROBE: NEGATIVE
T CELL COMMENT: ABNORMAL
T PALLIDUM AB SER QL: NONREACTIVE

## 2023-02-21 LAB — HIV1 RNA # PLAS NAA DL=20: NOT DETECTED COPIES/ML

## 2023-03-21 ENCOUNTER — E-VISIT (OUTPATIENT)
Dept: URGENT CARE | Facility: CLINIC | Age: 28
End: 2023-03-21
Payer: COMMERCIAL

## 2023-03-21 DIAGNOSIS — L70.0 ACNE VULGARIS: Primary | ICD-10-CM

## 2023-03-21 PROCEDURE — 99421 OL DIG E/M SVC 5-10 MIN: CPT | Performed by: PHYSICIAN ASSISTANT

## 2023-03-21 RX ORDER — TRETINOIN 0.1 MG/G
GEL TOPICAL AT BEDTIME
Qty: 45 G | Refills: 2 | Status: SHIPPED | OUTPATIENT
Start: 2023-03-21 | End: 2023-08-16

## 2023-03-24 ENCOUNTER — OFFICE VISIT (OUTPATIENT)
Dept: FAMILY MEDICINE | Facility: CLINIC | Age: 28
End: 2023-03-24
Payer: COMMERCIAL

## 2023-03-24 VITALS
OXYGEN SATURATION: 94 % | TEMPERATURE: 98.8 F | DIASTOLIC BLOOD PRESSURE: 76 MMHG | BODY MASS INDEX: 33.64 KG/M2 | RESPIRATION RATE: 14 BRPM | SYSTOLIC BLOOD PRESSURE: 124 MMHG | HEIGHT: 70 IN | HEART RATE: 83 BPM | WEIGHT: 235 LBS

## 2023-03-24 DIAGNOSIS — R21 RASH AND NONSPECIFIC SKIN ERUPTION: ICD-10-CM

## 2023-03-24 DIAGNOSIS — L42 PITYRIASIS ROSEA: Primary | ICD-10-CM

## 2023-03-24 PROCEDURE — 99213 OFFICE O/P EST LOW 20 MIN: CPT

## 2023-03-24 RX ORDER — ACYCLOVIR 400 MG/1
400 TABLET ORAL EVERY 8 HOURS
Qty: 30 TABLET | Refills: 0 | Status: SHIPPED | OUTPATIENT
Start: 2023-03-24 | End: 2023-08-16

## 2023-03-24 ASSESSMENT — PAIN SCALES - GENERAL: PAINLEVEL: NO PAIN (0)

## 2023-03-24 NOTE — PROGRESS NOTES
Assessment & Plan     Pityriasis Rosea, Rash and nonspecific skin eruption  - acyclovir (ZOVIRAX) 400 MG tablet; Take 1 tablet (400 mg) by mouth every 8 hours for 10 days  Rash appearance is most consistent with pityriasis rosea.  While patient does not currently have itching symptoms, the rash does have the appearance of a viral illness.  I discussed with patient that this is usually self-limiting and may last several months.  Patient reports that he has had previous outbreaks of rash that started in the face and progressed down to the legs with an unknown virus.  This rash has been affecting his self-image and self-esteem pretty severely.  Given this information, we can try a short course of acyclovir to see if this is helpful.  However, I did advise him that the rash is benign and should go away on its own.  Please follow-up if rash is not improving after 3 months.    Herrera Guido NP  [facility] REBECA Dong is a 27 year old, presenting for the following health issues:  Derm Problem    Past 1.5 weeks, patient has had a blotchy faint rash on bilateral arms, chest, lower back, a little bit on the genitals. Over past 2 days, patient has noticed it worsening, spreading to the arms and hands.  He noticed it started on the left arm in the antecubital. Bigger lesion on the chest and the neck.    Rash is not present on the legs. No changes to soaps/detegergents. Has tried acne cream.    Non-itchy, non-painful  HPI     Rash  Onset/Duration: 1-2 weeks  Description  Location: bilateral arms, chest, left hand, migrating to lower back  Character: blotchy, small reddish/purple marks  Itching: no  Intensity:  moderate  Progression of Symptoms:  worsening  Accompanying signs and symptoms:   Fever: No  Body aches or joint pain: No  Sore throat symptoms: No  Recent cold symptoms: No  History:           Previous episodes of similar rash: facial rash in 2015, but lesions were raised  New  "exposures:  New body wash, but is unsure if this is affecting him as the rash is not everywhere  Recent travel: No  Exposure to similar rash: unsure, was around a friend with monkeypox but did not have sexual intercourse with them  Precipitating or alleviating factors: n/a  Therapies tried and outcome: acne creams      Review of Systems   Constitutional, HEENT, cardiovascular, pulmonary, gi and gu systems are negative, except as otherwise noted.      Objective    /76 (BP Location: Right arm, Patient Position: Sitting, Cuff Size: Adult Large)   Pulse 83   Temp 98.8  F (37.1  C) (Temporal)   Resp 14   Ht 1.79 m (5' 10.47\")   Wt 106.6 kg (235 lb)   SpO2 94%   BMI 33.27 kg/m    Body mass index is 33.27 kg/m .  Physical Exam   GENERAL: healthy, alert and no distress  MS: no gross musculoskeletal defects noted, no edema  SKIN: Diffuse faint exanthemous blotchy rash on chest, bilateral arms, back.              NEURO: Normal strength and tone, mentation intact and speech normal  PSYCH: mentation appears normal, affect normal/bright            "

## 2023-03-29 ENCOUNTER — TELEPHONE (OUTPATIENT)
Dept: DERMATOLOGY | Facility: CLINIC | Age: 28
End: 2023-03-29

## 2023-03-29 ENCOUNTER — OFFICE VISIT (OUTPATIENT)
Dept: FAMILY MEDICINE | Facility: CLINIC | Age: 28
End: 2023-03-29
Payer: COMMERCIAL

## 2023-03-29 VITALS
WEIGHT: 238 LBS | SYSTOLIC BLOOD PRESSURE: 124 MMHG | OXYGEN SATURATION: 96 % | TEMPERATURE: 98.3 F | DIASTOLIC BLOOD PRESSURE: 86 MMHG | HEART RATE: 84 BPM | BODY MASS INDEX: 34.07 KG/M2 | RESPIRATION RATE: 18 BRPM | HEIGHT: 70 IN

## 2023-03-29 DIAGNOSIS — R00.2 PALPITATIONS: ICD-10-CM

## 2023-03-29 DIAGNOSIS — R21 RASH AND NONSPECIFIC SKIN ERUPTION: ICD-10-CM

## 2023-03-29 DIAGNOSIS — M54.31 SCIATICA, RIGHT SIDE: Primary | ICD-10-CM

## 2023-03-29 PROCEDURE — 99214 OFFICE O/P EST MOD 30 MIN: CPT | Performed by: STUDENT IN AN ORGANIZED HEALTH CARE EDUCATION/TRAINING PROGRAM

## 2023-03-29 PROCEDURE — 93000 ELECTROCARDIOGRAM COMPLETE: CPT | Performed by: STUDENT IN AN ORGANIZED HEALTH CARE EDUCATION/TRAINING PROGRAM

## 2023-03-29 RX ORDER — CYCLOBENZAPRINE HCL 5 MG
5 TABLET ORAL 3 TIMES DAILY PRN
Qty: 30 TABLET | Refills: 1 | Status: SHIPPED | OUTPATIENT
Start: 2023-03-29 | End: 2023-08-16

## 2023-03-29 RX ORDER — GABAPENTIN 100 MG/1
100 CAPSULE ORAL 3 TIMES DAILY
Qty: 30 CAPSULE | Refills: 1 | Status: SHIPPED | OUTPATIENT
Start: 2023-03-29 | End: 2023-08-16

## 2023-03-29 NOTE — TELEPHONE ENCOUNTER
This encounter is being sent to inform the clinic that this patient has a referral from Pinky Broussard MD in [facility] for the diagnoses of Rash and nonspecific skin eruption; Rash - New and has requested that this patient be seen within Urgent: 3-5 Days and/or with Urgent: 3-5 Days.  Based on the availability of our provider(s), we are unable to accommodate this request.      Were all sites offered this patient?  Yes  1st choice [facility] in [place]  2nd choice [facility] in [place]  3rd choice [facility] in [place]    Does scheduling algorithm request to schedule next available?  Patient appointment has not been scheduled.  Please review the referral request for accommodation and contact the patient.  If unable to accommodate, please resubmit a referral and indicate a preferred partner or affiliate location using Provider Finder or Scheduling Instructions field.      Referral Order in Epic  Records in Epic  No outside Records    Please review and call Pt to schedule in order listed above    Thank you very much!

## 2023-03-29 NOTE — PROGRESS NOTES
Assessment & Plan     (M54.31) Sciatica, right side  (primary encounter diagnosis)  Comment: Acute onset worsening.  Patient with worsening sciatica pain located on the right side.  At this time patient given sciatica stretches and exercises as well as will prescribe muscle relaxant and gabapentin to alleviate pain.  Patient encouraged to follow-up in 1 month for reevaluation  Plan: REVIEW OF HEALTH MAINTENANCE PROTOCOL ORDERS,         cyclobenzaprine (FLEXERIL) 5 MG tablet,         gabapentin (NEURONTIN) 100 MG capsule        1 month    (R00.2) Palpitations  Comment: Acute onset.  Patient having symptoms related to pulsations rather than palpitations.  Procedure is located over right side of chest along with right side of head.  Discussion regarding aspects of tension headaches versus anxiety driven response were had.  Has a potential to correlate aspects of stress related to school environment as well as the overall concerns with his body and skin rash development.  EKG in office to rule out cardiac pathologies was found to be normal however as patient was in office patient was experiencing symptoms while seated and so indication for Zio patch was initiated.  Patient encouraged to follow instructions on Zio patch and if symptoms worsen particularly at night to seek treatment with an urgent care.  Patient encouraged to use Excedrin or Tylenol for aspects of headache related pain  Plan: EKG 12-lead complete w/read - Clinics, Adult         Leadless EKG Monitor 3 to 7 Days            (R21) Rash and nonspecific skin eruption  Comment: Acute onset worsening.  Patient's rash started on left forearm which has diffusely spread in a maculopapular fashion.  Lesions noted over face, neck, anterior chest, bilateral arms, abdomen.  Due to increase in worsening symptoms will send patient urgently to dermatology for further evaluation.  Plan: REVIEW OF HEALTH MAINTENANCE PROTOCOL ORDERS,         Adult Dermatology Referral        " Pending evaluation               BMI:   Estimated body mass index is 33.7 kg/m  as calculated from the following:    Height as of this encounter: 1.79 m (5' 10.47\").    Weight as of this encounter: 108 kg (238 lb).   Weight management plan: Discussed healthy diet and exercise guidelines        ERICK ISSA MD  [facility] LYNETTE Dong is a 27 year old, presenting for the following health issues:  nerve pain   No flowsheet data found.  History of Present Illness       Reason for visit:  Nerve pain  Symptom onset:  3-7 days ago  Symptoms include:  Nerve pain, migraines, all on right side  Symptom intensity:  Moderate  Symptom progression:  Worsening  Had these symptoms before:  No  What makes it worse:  Walking, stairs, getting up, sleeping    He eats 0-1 servings of fruits and vegetables daily.He consumes 0 sweetened beverage(s) daily.He exercises with enough effort to increase his heart rate 60 or more minutes per day.  He exercises with enough effort to increase his heart rate 4 days per week.   He is taking medications regularly.     Sciatica and Pulsations in chest and head   Patient reports that the pain start in his right buttock on Thursday-Friday of last week. He reports starting to do stretches which have not helped  He reports there has been a pulsating in under his right color bone and pain on the right side of his head. He reports it worsened last night an it concerned him to the point that he almost went to be evaluated by the ED but didn't.   He reports he has never experienced a migraine however had felt pulsating pain on his right side of his head and right side of his chest.     Skin concerns  Two weeks ago patient reports that he has noticed skin changes diffusely over his body and over his hands.  He states that he was seen by a provider who informed him that his skin rash may be related to pityriasis rosacea however states that he was not given a dermatology " "referral.  Patient states that new lesions have popped up on his hands as well as areas of his face and diffusely over his chest.  He states that the skin lesions are nonpainful however are growing in size and numbers.  Patient states he is unsure if this is related to aspects of stress however states that his only changes in his life have been his diet.            Review of Systems   CONSTITUTIONAL: NEGATIVE for fever, chills, change in weight  INTEGUMENTARY/SKIN: POSITIVE for rash generalized  ENT/MOUTH: NEGATIVE for ear, mouth and throat problems  RESP: NEGATIVE for significant cough or SOB  CV: POSITIVE for pulsations over right collarbone with radiation to right breastbone and right side of head  MUSCULOSKELETAL: POSITIVE  for right-sided sciatica.      Objective    /86   Pulse 84   Temp 98.3  F (36.8  C) (Oral)   Resp 18   Ht 1.79 m (5' 10.47\")   Wt 108 kg (238 lb)   SpO2 96%   BMI 33.70 kg/m    Body mass index is 33.7 kg/m .  Physical Exam   GENERAL: healthy, alert and no distress  EYES: Eyes grossly normal to inspection, PERRL and conjunctivae and sclerae normal  RESP: lungs clear to auscultation - no rales, rhonchi or wheezes  CV: regular rate and rhythm, normal S1 S2, no S3 or S4, no murmur, click or rub, no peripheral edema and peripheral pulses strong  MS: no gross musculoskeletal defects noted, no edema  SKIN: maculopapular eruption - bilateral neck, and upper chest, abdomen, face and hands    No results found for any visits on 03/29/23.                "

## 2023-03-31 ENCOUNTER — OFFICE VISIT (OUTPATIENT)
Dept: DERMATOLOGY | Facility: CLINIC | Age: 28
End: 2023-03-31
Payer: COMMERCIAL

## 2023-03-31 ENCOUNTER — OFFICE VISIT (OUTPATIENT)
Dept: FAMILY MEDICINE | Facility: CLINIC | Age: 28
End: 2023-03-31
Payer: COMMERCIAL

## 2023-03-31 ENCOUNTER — LAB (OUTPATIENT)
Dept: LAB | Facility: CLINIC | Age: 28
End: 2023-03-31
Payer: COMMERCIAL

## 2023-03-31 ENCOUNTER — ALLIED HEALTH/NURSE VISIT (OUTPATIENT)
Dept: DERMATOLOGY | Facility: CLINIC | Age: 28
End: 2023-03-31
Payer: COMMERCIAL

## 2023-03-31 ENCOUNTER — ANCILLARY PROCEDURE (OUTPATIENT)
Dept: GENERAL RADIOLOGY | Facility: CLINIC | Age: 28
End: 2023-03-31
Attending: FAMILY MEDICINE
Payer: COMMERCIAL

## 2023-03-31 VITALS
RESPIRATION RATE: 20 BRPM | TEMPERATURE: 98.1 F | HEIGHT: 69 IN | HEART RATE: 89 BPM | OXYGEN SATURATION: 98 % | WEIGHT: 237.6 LBS | DIASTOLIC BLOOD PRESSURE: 83 MMHG | SYSTOLIC BLOOD PRESSURE: 138 MMHG | BODY MASS INDEX: 35.19 KG/M2

## 2023-03-31 DIAGNOSIS — R73.9 ELEVATED RANDOM BLOOD GLUCOSE LEVEL: ICD-10-CM

## 2023-03-31 DIAGNOSIS — R07.1 CHEST PAIN ON BREATHING: Primary | ICD-10-CM

## 2023-03-31 DIAGNOSIS — A51.49 SECONDARY SYPHILIS: Primary | ICD-10-CM

## 2023-03-31 DIAGNOSIS — R07.1 CHEST PAIN ON BREATHING: ICD-10-CM

## 2023-03-31 DIAGNOSIS — L30.9 DERMATITIS: Primary | ICD-10-CM

## 2023-03-31 DIAGNOSIS — R79.89 ELEVATED LFTS: ICD-10-CM

## 2023-03-31 DIAGNOSIS — L30.9 DERMATITIS: ICD-10-CM

## 2023-03-31 LAB
ALBUMIN SERPL-MCNC: 3.4 G/DL (ref 3.4–5)
ALP SERPL-CCNC: 158 U/L (ref 40–150)
ALT SERPL W P-5'-P-CCNC: 209 U/L (ref 0–70)
ANION GAP SERPL CALCULATED.3IONS-SCNC: 2 MMOL/L (ref 3–14)
AST SERPL W P-5'-P-CCNC: 120 U/L (ref 0–45)
BASOPHILS # BLD AUTO: 0 10E3/UL (ref 0–0.2)
BASOPHILS NFR BLD AUTO: 0 %
BILIRUB SERPL-MCNC: 0.4 MG/DL (ref 0.2–1.3)
BUN SERPL-MCNC: 10 MG/DL (ref 7–30)
CALCIUM SERPL-MCNC: 9.2 MG/DL (ref 8.5–10.1)
CHLORIDE BLD-SCNC: 109 MMOL/L (ref 94–109)
CO2 SERPL-SCNC: 29 MMOL/L (ref 20–32)
CREAT SERPL-MCNC: 1.25 MG/DL (ref 0.66–1.25)
EOSINOPHIL # BLD AUTO: 0.1 10E3/UL (ref 0–0.7)
EOSINOPHIL NFR BLD AUTO: 1 %
ERYTHROCYTE [DISTWIDTH] IN BLOOD BY AUTOMATED COUNT: 13.7 % (ref 10–15)
GFR SERPL CREATININE-BSD FRML MDRD: 80 ML/MIN/1.73M2
GLUCOSE BLD-MCNC: 117 MG/DL (ref 70–99)
HCT VFR BLD AUTO: 41.9 % (ref 40–53)
HGB BLD-MCNC: 12.6 G/DL (ref 13.3–17.7)
IMM GRANULOCYTES # BLD: 0.1 10E3/UL
IMM GRANULOCYTES NFR BLD: 1 %
LYMPHOCYTES # BLD AUTO: 1.1 10E3/UL (ref 0.8–5.3)
LYMPHOCYTES NFR BLD AUTO: 20 %
MCH RBC QN AUTO: 22.6 PG (ref 26.5–33)
MCHC RBC AUTO-ENTMCNC: 30.1 G/DL (ref 31.5–36.5)
MCV RBC AUTO: 75 FL (ref 78–100)
MONOCYTES # BLD AUTO: 0.6 10E3/UL (ref 0–1.3)
MONOCYTES NFR BLD AUTO: 11 %
NEUTROPHILS # BLD AUTO: 3.9 10E3/UL (ref 1.6–8.3)
NEUTROPHILS NFR BLD AUTO: 67 %
NRBC # BLD AUTO: 0 10E3/UL
NRBC BLD AUTO-RTO: 0 /100
PLATELET # BLD AUTO: 370 10E3/UL (ref 150–450)
POTASSIUM BLD-SCNC: 4 MMOL/L (ref 3.4–5.3)
PROT SERPL-MCNC: 8.2 G/DL (ref 6.8–8.8)
RBC # BLD AUTO: 5.57 10E6/UL (ref 4.4–5.9)
RPR SER QL: REACTIVE
SODIUM SERPL-SCNC: 140 MMOL/L (ref 133–144)
T PALLIDUM AB SER QL: REACTIVE
WBC # BLD AUTO: 5.7 10E3/UL (ref 4–11)

## 2023-03-31 PROCEDURE — 36415 COLL VENOUS BLD VENIPUNCTURE: CPT | Performed by: PATHOLOGY

## 2023-03-31 PROCEDURE — 85025 COMPLETE CBC W/AUTO DIFF WBC: CPT | Performed by: PATHOLOGY

## 2023-03-31 PROCEDURE — 86593 SYPHILIS TEST NON-TREP QUANT: CPT | Mod: 90 | Performed by: PATHOLOGY

## 2023-03-31 PROCEDURE — 96372 THER/PROPH/DIAG INJ SC/IM: CPT | Performed by: DERMATOLOGY

## 2023-03-31 PROCEDURE — 87799 DETECT AGENT NOS DNA QUANT: CPT | Mod: 90 | Performed by: PATHOLOGY

## 2023-03-31 PROCEDURE — 99204 OFFICE O/P NEW MOD 45 MIN: CPT | Mod: 25 | Performed by: DERMATOLOGY

## 2023-03-31 PROCEDURE — 86592 SYPHILIS TEST NON-TREP QUAL: CPT | Mod: 90 | Performed by: PATHOLOGY

## 2023-03-31 PROCEDURE — 99000 SPECIMEN HANDLING OFFICE-LAB: CPT | Performed by: PATHOLOGY

## 2023-03-31 PROCEDURE — 91312 COVID-19 VACCINE BIVALENT BOOSTER 12+ (PFIZER): CPT | Performed by: FAMILY MEDICINE

## 2023-03-31 PROCEDURE — 0124A COVID-19 VACCINE BIVALENT BOOSTER 12+ (PFIZER): CPT | Performed by: FAMILY MEDICINE

## 2023-03-31 PROCEDURE — 99213 OFFICE O/P EST LOW 20 MIN: CPT | Mod: 25 | Performed by: FAMILY MEDICINE

## 2023-03-31 PROCEDURE — 80053 COMPREHEN METABOLIC PANEL: CPT | Performed by: FAMILY MEDICINE

## 2023-03-31 PROCEDURE — 86780 TREPONEMA PALLIDUM: CPT | Mod: 90 | Performed by: PATHOLOGY

## 2023-03-31 PROCEDURE — 71046 X-RAY EXAM CHEST 2 VIEWS: CPT | Mod: TC | Performed by: RADIOLOGY

## 2023-03-31 PROCEDURE — 87533 HHV-6 DNA QUANT: CPT | Mod: 90 | Performed by: PATHOLOGY

## 2023-03-31 PROCEDURE — 86803 HEPATITIS C AB TEST: CPT | Mod: 90 | Performed by: PATHOLOGY

## 2023-03-31 PROCEDURE — 99207 PR NO CHARGE NURSE ONLY: CPT | Performed by: DERMATOLOGY

## 2023-03-31 PROCEDURE — 83036 HEMOGLOBIN GLYCOSYLATED A1C: CPT | Mod: 90 | Performed by: PATHOLOGY

## 2023-03-31 RX ORDER — TRIAMCINOLONE ACETONIDE 1 MG/G
CREAM TOPICAL
Qty: 454 G | Refills: 2 | Status: SHIPPED | OUTPATIENT
Start: 2023-03-31 | End: 2023-08-16

## 2023-03-31 ASSESSMENT — PAIN SCALES - GENERAL: PAINLEVEL: SEVERE PAIN (6)

## 2023-03-31 NOTE — PROGRESS NOTES
Iker Berrios comes into clinic today at the request of Dr. Ward Ordering Provider for Med Injection only Penicillin.    This service provided today was under the supervising provider of the day Dr. Osborne, who was available if needed.    Ximena Springer RN

## 2023-03-31 NOTE — PATIENT INSTRUCTIONS
At [facility], we strive to deliver an exceptional experience to you, every time we see you. If you receive a survey, please complete it as we do value your feedback.  If you have MyChart, you can expect to receive results automatically within 24 hours of their completion.  Your provider will send a note interpreting your results as well.   If you do not have MyChart, you should receive your results in about a week by mail.    Your care team:                            Family Medicine Internal Medicine   MD Mike Wei MD Shantel Branch-Fleming, MD Srinivasa Vaka, MD Katya Belousova, PALUCINDA Ross CNP, MD ([facility]   Gil Díaz, MD Hannah Cerna MD Amelia Massimini APRN GERARD Gandhi APRN MD Yu Cho MD          Clinic hours: Monday - Thursday 7 am-6 pm; Fridays 7 am-5 pm.   Urgent care: Monday - Friday 10 am- 8 pm; Saturday and Sunday 9 am-5 pm.    Clinic: [phone]       [facility]: Monday - Thursday 8 am - 7 pm; Friday 8 am - 6 pm  [facility]: [phone]

## 2023-03-31 NOTE — NURSING NOTE
Chief Complaint   Patient presents with     Rash     Pt reports a rash on arms, chest, torso, and back. It has improved, but it is not completely gone.     Derm Problem     Pt would also like some spots on upper body looked at.     Amrik Frye, EMT

## 2023-03-31 NOTE — LETTER
3/31/2023       RE: Iker Berrios  [address]  [place] [address]     Dear Colleague,    Thank you for referring your patient, Iker Berrios, to the [facility] DERMAT[facility] [place] [facility]. Please see a copy of my visit note below.    [facility] Dermatology Note  Encounter Date: Mar 31, 2023  Office Visit     Dermatology Problem List:  1. Morbilliform eruption ddx viral exanthem, syphilis, less likely medication reaction.  - Lab workup: CBC, EBV DNA PCR, Treponemia Abs w reflex to RPR and titer, HHV6 DNA Quant PCR 3/31/2023   - triamcinolone 0.1% cream  ____________________________________________    Assessment & Plan:    # Morbilliform eruption. New, undiagnosed problem with uncertain prognosis.   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  Discussed punch biopsy for definitive diagnosis, but patient declines today due to concerns about scarring from procedure. Given rash is improving, recommended lab work-up as below including repeat treponemal antibody and adjuvant use of topical steroids. If rash not improving, patient will contact clinic to return PRN for re-evaluation and consider biopsy at that point.  - Lab workup: CBC, EBV DNA PCR, Treponemia Abs w reflex to RPR and titer, HHV6 DNA Quant PCR  - Start triamcinolone 0.1% cream BID prn    Procedures Performed:   None    Follow-up: prn for new or changing lesions    Staff and Scribe:     Scribe Disclosure:  I, Frankie Sumner, am serving as a scribe to document services personally performed by Teja Ward MD based on data collection and the provider's statements to me.     Provider Disclosure:   The documentation recorded by the scribe accurately reflects the services I personally performed and the decisions made by me.    Teja Ward MD    Department of Dermatology  [facility]  [facility]: Phone: [phone], Fax:[phone]  [facility]: Phone: [phone] Fax: [phone]  ____________________________________________    CC: Rash (Pt reports a rash on arms, chest, torso, and back. It has improved, but it is not completely gone.) and Derm Problem (Pt would also like some spots on upper body looked at.)    HPI:  Mr. Iker Berrios is a(n) 27 year old male who presents today as a new patient for rash. Patient was seen by Herrera Miranda NP, on 3/24/2023, and he was started on acyclovir for treatment of pityriasis rosacea.    Today, patient reports rash which started on left arm then spread down the arm and across the chest, neck, back, and face. Denies associated itch. Denies recent illnesses or recent medication changes. Sexually active with men only.    Patient is otherwise feeling well, without additional skin concerns.    Labs Reviewed:  N/A    Physical Exam:  Vitals: There were no vitals taken for this visit.  SKIN: Focused examination of trunk and upper extremities was performed.  - Numerous small pink macules and papules coalescing into thin patches and plaques on the trunk, upper extremities including palms.  - No other lesions of concern on areas examined.     Medications:  Current Outpatient Medications   Medication     acyclovir (ZOVIRAX) 400 MG tablet     cyclobenzaprine (FLEXERIL) 5 MG tablet     gabapentin (NEURONTIN) 100 MG capsule     tretinoin (RETIN-A) 0.01 % external gel     TRIUMEQ 600- MG per tablet     No current facility-administered medications for this visit.      Past Medical History:   Patient Active Problem List   Diagnosis     Human immunodeficiency virus I infection (H)     History of depression     Gonorrhea     Recurrent streptococcal tonsillitis     Past Medical History:   Diagnosis Date     Human immunodeficiency virus I infection (H) 06/19/2018    Likely  infected 3/18.  On antiretroviral therapy with Triumeq since 7/9/18.  Pre-treatment HIV viral load 2,654 copies/ml.  Scott CD4+ cell count 763.        CC Pinky Broussard MD  [address] [facility] [place] [id number] on close of this encounter.

## 2023-03-31 NOTE — PROGRESS NOTES
Assessment & Plan     Chest pain on breathing  -Discomfort in right lower chest for the past 3 to 4 days.  -Denied fever/recent cold/cough/sick contact exposure  -Tylenol helps to some extent.  -Has been having morbilliform eruption-saw dermatologist today-on triamcinolone.    PLAN:  -Clinical examination benign.  Lungs clear with no concerns.  -Chest x-ray for reassurance.  -Continue Tylenol/ibuprofen as needed and if pain continues to progress or developing any other new signs or symptoms he has to follow-up in the clinic sooner.  -Otherwise watchful monitoring for 10 to 14 days.    - XR Chest 2 Views; Future  - Comprehensive metabolic panel (BMP + Alb, Alk Phos, ALT, AST, Total. Bili, TP); Future  - Comprehensive metabolic panel (BMP + Alb, Alk Phos, ALT, AST, Total. Bili, TP)             Yu Hooks MD  [facility]    Ney Dong is a 27 year old, presenting for the following health issues:  Chest Pain    Additional Questions 3/31/2023   Roomed by Sammie CUELLO     Chest Pain  Onset/Duration: about four days  Description:   Location: right side  Character: sharp and achey  Radiation: from left side over shoulder, across entire chest and from right side over shoulder  Duration: Does not stop and constant   Intensity: moderate  Progression of Symptoms: worsening and constant  Accompanying Signs & Symptoms:  Shortness of breath: N/A  Sweating: No  Nausea/vomiting: No  Lightheadedness: No  Palpitations: No  Fever/Chills: No  Cough: No           Heartburn: No  History:   Family history of heart disease: No  Tobacco use: No  Previous similar symptoms: YES- with right side collar bone and head  Precipitating factors:   Worse with exertion: YES  Worse with deep breaths: YES           Related to eating: No           Better with burping: No  Alleviating factors: none  Therapies tried and outcome: tylenol. Not helpful.        Right lower chest since 4 days  No  "fever, cough , cold  No trauma     Review of Systems   Constitutional, HEENT, cardiovascular, pulmonary, gi and gu systems are negative, except as otherwise noted.      Objective    /83 (BP Location: Left arm, Patient Position: Sitting, Cuff Size: Adult Large)   Pulse 89   Temp 98.1  F (36.7  C) (Oral)   Resp 20   Ht 1.764 m (5' 9.45\")   Wt 107.8 kg (237 lb 9.6 oz)   SpO2 98%   BMI 34.64 kg/m    Body mass index is 34.64 kg/m .  Physical Exam   GENERAL: healthy, alert and no distress  NECK: no adenopathy, no asymmetry, masses, or scars and thyroid normal to palpation  RESP: lungs clear to auscultation - no rales, rhonchi or wheezes  CV: regular rate and rhythm, normal S1 S2, no S3 or S4, no murmur, click or rub, no peripheral edema and peripheral pulses strong  ABDOMEN: soft, nontender, no hepatosplenomegaly, no masses and bowel sounds normal  MS: no gross musculoskeletal defects noted, no edema                    "

## 2023-03-31 NOTE — RESULT ENCOUNTER NOTE
Trixie sent.   Can we call patient to see if he can come in before 5 PM for a IM penicillin shot (ordered) or otherwise schedule nurse visit for Monday for it.   CC'ing Dr. Shaw as NAVIDI so he can track RPR titers after treatment.     Thanks!    Teja Ward MD  Pronouns: he/him/his    Department of Dermatology  [facility]: Phone: [phone], Fax:[phone]  [facility]: Phone: [phone] Fax: [phone]

## 2023-04-03 DIAGNOSIS — R79.89 ELEVATED LFTS: ICD-10-CM

## 2023-04-03 DIAGNOSIS — R73.9 ELEVATED RANDOM BLOOD GLUCOSE LEVEL: Primary | ICD-10-CM

## 2023-04-03 LAB
HBA1C MFR BLD: 5.8 %
HHV6 DNA # SPEC NAA+PROBE: NOT DETECTED COPIES/ML
RPR SER-TITR: ABNORMAL {TITER}

## 2023-04-04 ENCOUNTER — ANCILLARY PROCEDURE (OUTPATIENT)
Dept: ULTRASOUND IMAGING | Facility: CLINIC | Age: 28
End: 2023-04-04
Attending: FAMILY MEDICINE
Payer: COMMERCIAL

## 2023-04-04 ENCOUNTER — LAB (OUTPATIENT)
Dept: LAB | Facility: CLINIC | Age: 28
End: 2023-04-04
Payer: COMMERCIAL

## 2023-04-04 DIAGNOSIS — R79.89 ELEVATED LFTS: ICD-10-CM

## 2023-04-04 LAB
ALBUMIN SERPL BCG-MCNC: 4 G/DL (ref 3.5–5.2)
ALP SERPL-CCNC: 161 U/L (ref 40–129)
ALT SERPL W P-5'-P-CCNC: 123 U/L (ref 10–50)
AST SERPL W P-5'-P-CCNC: 45 U/L (ref 10–50)
BILIRUB DIRECT SERPL-MCNC: <0.2 MG/DL (ref 0–0.3)
BILIRUB SERPL-MCNC: 0.2 MG/DL
EBV DNA COPIES/ML, INSTRUMENT: 1616 COPIES/ML
EBV DNA SPEC NAA+PROBE-LOG#: 3.2 {LOG_COPIES}/ML
PROT SERPL-MCNC: 8.2 G/DL (ref 6.4–8.3)

## 2023-04-04 PROCEDURE — 76705 ECHO EXAM OF ABDOMEN: CPT | Mod: GC | Performed by: RADIOLOGY

## 2023-04-04 PROCEDURE — 80076 HEPATIC FUNCTION PANEL: CPT | Performed by: PATHOLOGY

## 2023-04-04 PROCEDURE — 36415 COLL VENOUS BLD VENIPUNCTURE: CPT | Performed by: PATHOLOGY

## 2023-04-04 NOTE — PROGRESS NOTES
4/4/2023- Reported syphilis case to Lauren Edmondson at [facility]. Eva Chau, Infection Prevention

## 2023-04-05 DIAGNOSIS — R79.89 ELEVATED LFTS: Primary | ICD-10-CM

## 2023-04-06 LAB — HCV AB SERPL QL IA: NONREACTIVE

## 2023-06-02 ENCOUNTER — MYC MEDICAL ADVICE (OUTPATIENT)
Dept: INFECTIOUS DISEASES | Facility: CLINIC | Age: 28
End: 2023-06-02
Payer: COMMERCIAL

## 2023-06-02 ENCOUNTER — HEALTH MAINTENANCE LETTER (OUTPATIENT)
Age: 28
End: 2023-06-02

## 2023-06-02 DIAGNOSIS — Z21 HUMAN IMMUNODEFICIENCY VIRUS I INFECTION (H): ICD-10-CM

## 2023-06-02 RX ORDER — ABACAVIR SULFATE, DOLUTEGRAVIR SODIUM, LAMIVUDINE 600; 50; 300 MG/1; MG/1; MG/1
1 TABLET, FILM COATED ORAL DAILY
Qty: 30 TABLET | Refills: 2 | Status: SHIPPED | OUTPATIENT
Start: 2023-06-02 | End: 2023-08-16

## 2023-06-02 NOTE — TELEPHONE ENCOUNTER
Per [facility] Ambulatory Care Protocol, Pt is due for refill based on last refill date.   Pt has seen provider within the past year, or has been informed that needs appt before further refills, will send to clinic support person to schedule appt with provider or alt provider to meet clinic requirements.     90 day Med refill script E-sent to pt's pharmacy.      Keith Fields RN  Infectious Disease 06/02/23 10:18 AM

## 2023-06-14 ENCOUNTER — LAB (OUTPATIENT)
Dept: LAB | Facility: CLINIC | Age: 28
End: 2023-06-14
Payer: COMMERCIAL

## 2023-06-14 ENCOUNTER — ALLIED HEALTH/NURSE VISIT (OUTPATIENT)
Dept: INFECTIOUS DISEASES | Facility: CLINIC | Age: 28
End: 2023-06-14
Attending: INTERNAL MEDICINE
Payer: COMMERCIAL

## 2023-06-14 DIAGNOSIS — Z21 HUMAN IMMUNODEFICIENCY VIRUS I INFECTION (H): ICD-10-CM

## 2023-06-14 DIAGNOSIS — Z21 HUMAN IMMUNODEFICIENCY VIRUS I INFECTION (H): Primary | ICD-10-CM

## 2023-06-14 DIAGNOSIS — Z11.3 SCREENING FOR STD (SEXUALLY TRANSMITTED DISEASE): ICD-10-CM

## 2023-06-14 DIAGNOSIS — Z11.3 SCREENING FOR STD (SEXUALLY TRANSMITTED DISEASE): Primary | ICD-10-CM

## 2023-06-14 DIAGNOSIS — R79.89 ELEVATED LFTS: ICD-10-CM

## 2023-06-14 LAB
ALBUMIN SERPL BCG-MCNC: 4.6 G/DL (ref 3.5–5.2)
ALP SERPL-CCNC: 60 U/L (ref 40–129)
ALT SERPL W P-5'-P-CCNC: 37 U/L (ref 0–70)
ANION GAP SERPL CALCULATED.3IONS-SCNC: 11 MMOL/L (ref 7–15)
AST SERPL W P-5'-P-CCNC: 35 U/L (ref 0–45)
BASOPHILS # BLD AUTO: 0 10E3/UL (ref 0–0.2)
BASOPHILS NFR BLD AUTO: 1 %
BILIRUB DIRECT SERPL-MCNC: <0.2 MG/DL (ref 0–0.3)
BILIRUB SERPL-MCNC: 0.2 MG/DL
BUN SERPL-MCNC: 17.5 MG/DL (ref 6–20)
CALCIUM SERPL-MCNC: 9.8 MG/DL (ref 8.6–10)
CHLORIDE SERPL-SCNC: 106 MMOL/L (ref 98–107)
CREAT SERPL-MCNC: 1.37 MG/DL (ref 0.67–1.17)
DEPRECATED HCO3 PLAS-SCNC: 25 MMOL/L (ref 22–29)
EOSINOPHIL # BLD AUTO: 0.2 10E3/UL (ref 0–0.7)
EOSINOPHIL NFR BLD AUTO: 3 %
ERYTHROCYTE [DISTWIDTH] IN BLOOD BY AUTOMATED COUNT: 17.2 % (ref 10–15)
GFR SERPL CREATININE-BSD FRML MDRD: 72 ML/MIN/1.73M2
GLUCOSE SERPL-MCNC: 79 MG/DL (ref 70–99)
HCT VFR BLD AUTO: 46.4 % (ref 40–53)
HGB BLD-MCNC: 14.4 G/DL (ref 13.3–17.7)
IMM GRANULOCYTES # BLD: 0 10E3/UL
IMM GRANULOCYTES NFR BLD: 0 %
LYMPHOCYTES # BLD AUTO: 2.6 10E3/UL (ref 0.8–5.3)
LYMPHOCYTES NFR BLD AUTO: 50 %
MCH RBC QN AUTO: 23.2 PG (ref 26.5–33)
MCHC RBC AUTO-ENTMCNC: 31 G/DL (ref 31.5–36.5)
MCV RBC AUTO: 75 FL (ref 78–100)
MONOCYTES # BLD AUTO: 0.5 10E3/UL (ref 0–1.3)
MONOCYTES NFR BLD AUTO: 8 %
NEUTROPHILS # BLD AUTO: 2.1 10E3/UL (ref 1.6–8.3)
NEUTROPHILS NFR BLD AUTO: 38 %
NRBC # BLD AUTO: 0 10E3/UL
NRBC BLD AUTO-RTO: 0 /100
PLATELET # BLD AUTO: 271 10E3/UL (ref 150–450)
POTASSIUM SERPL-SCNC: 4.1 MMOL/L (ref 3.4–5.3)
PROT SERPL-MCNC: 7.9 G/DL (ref 6.4–8.3)
RBC # BLD AUTO: 6.22 10E6/UL (ref 4.4–5.9)
SODIUM SERPL-SCNC: 142 MMOL/L (ref 136–145)
WBC # BLD AUTO: 5.4 10E3/UL (ref 4–11)

## 2023-06-14 PROCEDURE — 99000 SPECIMEN HANDLING OFFICE-LAB: CPT | Performed by: PATHOLOGY

## 2023-06-14 PROCEDURE — 87591 N.GONORRHOEAE DNA AMP PROB: CPT | Mod: 90 | Performed by: PATHOLOGY

## 2023-06-14 PROCEDURE — 87491 CHLMYD TRACH DNA AMP PROBE: CPT | Mod: 90 | Performed by: PATHOLOGY

## 2023-06-14 PROCEDURE — 87536 HIV-1 QUANT&REVRSE TRNSCRPJ: CPT | Mod: 90 | Performed by: PATHOLOGY

## 2023-06-14 PROCEDURE — 82248 BILIRUBIN DIRECT: CPT | Performed by: PATHOLOGY

## 2023-06-14 PROCEDURE — 86593 SYPHILIS TEST NON-TREP QUANT: CPT | Mod: 90 | Performed by: PATHOLOGY

## 2023-06-14 PROCEDURE — 86360 T CELL ABSOLUTE COUNT/RATIO: CPT | Mod: 90 | Performed by: PATHOLOGY

## 2023-06-14 PROCEDURE — 86780 TREPONEMA PALLIDUM: CPT | Mod: 90 | Performed by: PATHOLOGY

## 2023-06-14 PROCEDURE — 80053 COMPREHEN METABOLIC PANEL: CPT | Performed by: PATHOLOGY

## 2023-06-14 PROCEDURE — 36415 COLL VENOUS BLD VENIPUNCTURE: CPT | Performed by: PATHOLOGY

## 2023-06-14 PROCEDURE — 86592 SYPHILIS TEST NON-TREP QUAL: CPT | Mod: 90 | Performed by: PATHOLOGY

## 2023-06-14 PROCEDURE — 85025 COMPLETE CBC W/AUTO DIFF WBC: CPT | Performed by: PATHOLOGY

## 2023-06-14 PROCEDURE — 87591 N.GONORRHOEAE DNA AMP PROB: CPT

## 2023-06-14 PROCEDURE — 86359 T CELLS TOTAL COUNT: CPT | Mod: 90 | Performed by: PATHOLOGY

## 2023-06-14 PROCEDURE — 87491 CHLMYD TRACH DNA AMP PROBE: CPT

## 2023-06-14 NOTE — PROGRESS NOTES
Pt is here/calling today and is requesting STI testing.     Per [facility] Ambulatory Care Protocol, STI orders entered for pt.

## 2023-06-15 LAB
C TRACH DNA SPEC QL NAA+PROBE: NEGATIVE
CD3 CELLS # BLD: 1766 CELLS/UL (ref 603–2990)
CD3 CELLS NFR BLD: 70 % (ref 49–84)
CD3+CD4+ CELLS # BLD: 991 CELLS/UL (ref 441–2156)
CD3+CD4+ CELLS NFR BLD: 39 % (ref 28–63)
CD3+CD4+ CELLS/CD3+CD8+ CLL BLD: 1.41 % (ref 1.4–2.6)
CD3+CD8+ CELLS # BLD: 700 CELLS/UL (ref 125–1312)
CD3+CD8+ CELLS NFR BLD: 28 % (ref 10–40)
HIV1 RNA # PLAS NAA DL=20: <20 COPIES/ML
HIV1 RNA SERPL NAA+PROBE-LOG#: <1.3 {LOG_COPIES}/ML
N GONORRHOEA DNA SPEC QL NAA+PROBE: NEGATIVE
RPR SER QL: REACTIVE
RPR SER-TITR: ABNORMAL {TITER}
T CELL COMMENT: NORMAL
T PALLIDUM AB SER QL: REACTIVE

## 2023-08-16 ENCOUNTER — OFFICE VISIT (OUTPATIENT)
Dept: INFECTIOUS DISEASES | Facility: CLINIC | Age: 28
End: 2023-08-16
Attending: INTERNAL MEDICINE
Payer: COMMERCIAL

## 2023-08-16 VITALS
HEIGHT: 69 IN | SYSTOLIC BLOOD PRESSURE: 123 MMHG | BODY MASS INDEX: 35.01 KG/M2 | WEIGHT: 236.4 LBS | HEART RATE: 62 BPM | OXYGEN SATURATION: 93 % | DIASTOLIC BLOOD PRESSURE: 80 MMHG

## 2023-08-16 DIAGNOSIS — N18.2 CHRONIC RENAL DISEASE, STAGE 2, MILDLY DECREASED GLOMERULAR FILTRATION RATE (GFR) BETWEEN 60-89 ML/MIN/1.73 SQUARE METER: ICD-10-CM

## 2023-08-16 DIAGNOSIS — Z86.19 HISTORY OF SEXUALLY TRANSMITTED DISEASE: ICD-10-CM

## 2023-08-16 DIAGNOSIS — Z86.19: ICD-10-CM

## 2023-08-16 DIAGNOSIS — Z21 HUMAN IMMUNODEFICIENCY VIRUS I INFECTION (H): Primary | ICD-10-CM

## 2023-08-16 PROCEDURE — 99214 OFFICE O/P EST MOD 30 MIN: CPT | Performed by: INTERNAL MEDICINE

## 2023-08-16 PROCEDURE — G0463 HOSPITAL OUTPT CLINIC VISIT: HCPCS | Performed by: INTERNAL MEDICINE

## 2023-08-16 RX ORDER — ABACAVIR SULFATE, DOLUTEGRAVIR SODIUM, LAMIVUDINE 600; 50; 300 MG/1; MG/1; MG/1
1 TABLET, FILM COATED ORAL DAILY
Qty: 30 TABLET | Refills: 11 | Status: SHIPPED | OUTPATIENT
Start: 2023-08-16 | End: 2024-06-17

## 2023-08-16 ASSESSMENT — PAIN SCALES - GENERAL: PAINLEVEL: NO PAIN (0)

## 2023-08-16 NOTE — NURSING NOTE
"Chief Complaint   Patient presents with    RECHECK     B20     /80   Pulse 62   Ht 1.764 m (5' 9.45\")   Wt 107.2 kg (236 lb 6.4 oz)   SpO2 93%   BMI 34.46 kg/m        Jesús Morgan MA   "

## 2023-08-16 NOTE — LETTER
8/16/2023       RE: Iker Berrios  [address]  [place] [address]     Dear Colleague,    Thank you for referring your patient, Iker Berrios, to the [facility] INFECTIOUS DISEASE CLINIC [place] at [facility]. Please see a copy of my visit note below.      Division of Infectious Diseases and International Medicine  Department of Medicine        [facility] OUTPATIENT VISIT NOTE [place] Mail Code [phone] [place] [address]  Office: [phone]  Fax:  [phone]     HISTORY OF PRESENT ILLNESS:    Iker Berrios is a personable 27-year-old gentleman with asymptomatic HIV infection (diagnosed 6/19/18, infection probably acquired in 3/18 when he had a viral syndrome).  He returns to clinic today for annual follow-up of ongoing antiretroviral treatment with Triumeq one tablet PO each evening about 10 PM (started 7/9/18).  He continues to miss about two doses of Triumeq on average per month, usually due to being out socially on weekend evenings and then forgetting when he eventually comes home.  He does set a daily cell phone alarm to remind him of his 10 PM dose, but does not have another way to remember if he is too otherwise occupied to take the medication at the moment that alarm goes off.  He also missed about a week of Triumeq about 1.5 months ago when there was some miscommunication between his [facility] pharmacy and his healthcare insurance regarding whether his insurance was active -- he made a number of phone calls to get that resolved and he did not have difficulty obtaining his most recent medication refill.  He experiences no Triumeq side effects.  He had routine HIV monitoring laboratory studies drawn on 6/14/23 which were excellent (plasma HIV viral load < 20 copies/ml, absolute CD4+ cell count 991) except a stable chronically elevated creatinine 1.37.  He received a dose of benzathine penicillin for  secondary syphilis [facility] on 3/31/23 and his 6/14/23 RPR showed a decreasing titer down to 1:8 from a previous 1:64.  All other STI screening tests were negative.  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, but he has now graduated and plans to start a new (as yet undecided) at the beginning of October after taking the next 1.5 months for some vacation.  With that, he has recently resumed his prior habit of visiting a gym to exercise about five times per week.  His mood has been stable over the past year even though he has been off sertraline since ~ 6/20.  Overall, he reports feeling well over the past year and he lacks new concerns or complaints today, including no recent febrile or EENT symptoms, cough, dyspnea, chest pain, GI or  symptoms, skin issues, or neurological symptoms.    PAST MEDICAL HISTORY:  Past Medical History:   Diagnosis Date    Human immunodeficiency virus I infection (H) 06/19/2018    Likely infected 3/18.  On antiretroviral therapy with Triumeq since 7/9/18.  Pre-treatment HIV viral load 2,654 copies/ml.  Scott CD4+ cell count 763.   - Diagnosed HIV positive: 6/19/18 at [facility].  (Had been on Truvada for PrEP from 4913-0815. Tested HIV negative coming off of Truvada PrEP. He was noted to have a viral illness in March 2018 that lasted about 2 weeks).  HIV plasma viral load at diagnosis: 2654.  Absolute CD4 count at diagnosis: 783. Antiretroviral therapy started 7/9/18 with Triumeq.  HIV Genotype: 6/07/18 - No resistance found.  No history of HIV-related sequelae.  HLA B57:01 negative.  - Major depression at time of HIV diagnosis.  Resolved by mid-2019, off selective serotonin reuptake inhibitor therapy since 6/20, no further counseling by autumn 2019.  - STI history:  6/19/2018 GC/Chlamydia Negative, 6/27/2018 RPR Negative, HCV  "negative.  11/8/2018: TPA nonreactive.  11/14/2018: GC/CT Urine and rectal positive, GC Throat positive.  1/16/19 and 4/17/19: GC/CT, treponema negative.  8/16/19: GC negative, CT positive from urine and rectum, treponema negative.  Throat again positive for gonorrhea 2/4/22.    Past Surgical History:   Procedure Laterality Date    COSMETIC SURGERY      none      ORTHOPEDIC SURGERY      TONSILLECTOMY Bilateral 12/20/2022    Procedure: BILATERAL TONSILLECTOMY;  Surgeon: Toby Cabrera MD;  Location: [facility] OR     ALLERGIES:   No Known Allergies    MEDICATIONS:  Current Outpatient Medications   Medication Sig Dispense Refill    abacavir-dolutegravir-LamiVUDine (TRIUMEQ) 600- MG per tablet Take 1 tablet by mouth daily 30 tablet 11     SOCIAL HISTORY:  [employer] bachelor's degree in psychology obtained spring 2020 and now finished a graduate Master's Degree program in Social Work focusing on Integrated Behvioral Health at the [employer] this summer.  Presently exploring employment options.  Served in [employer] from 7039-7500 (worked on [employer]). Deployed to [employer] for three years.  Sexual preference: MSM, both insertive and receptive, inconsistent condom use.  Illicit drugs:  None.  Exercises at a gym about five times per week.    FAMILY HISTORY:  Family History   Problem Relation Age of Onset    Diabetes Maternal Grandmother     Asthma No family hx of     C.A.D. No family hx of     Hypertension No family hx of     Cerebrovascular Disease No family hx of     Cancer No family hx of      REVIEW OF SYSTEMS:  As per HPI.    PHYSICAL EXAMINATION:  General:  A pleasant, articulate WDWN, 27-year-old man in no discomfort.  Vital signs:  /80   Pulse 62   Ht 1.764 m (5' 9.45\")   Wt 107.2 kg (236 lb 6.4 oz)   SpO2 93%   BMI 34.46 kg/m   (weight / BMI are stable over the past half year but increased ten+ pounds versus 4/2022).  Skin:  No visible rash or lesion.  Head/Neck:  NCAT.  External " auditory canals are patent without otorrhea.  PERRL.  EOMI.  Conjunctivae are pink and uninjected.  Sclerae are anicteric.  Oropharynx has no erythema, exudate, or lesion.  Dentition is normal.  Neck is supple without lymphadenopathy or thyromegaly.  Chest:  Bilaterally clear to auscultation.  CV:  RRR.  Normal S1, S2, without gallop, murmur, or rub.  Abdomen:  Bowel sounds active, soft, nontender, no hepatosplenomegaly.  Back:  No low back or CVA tenderness.  Extremities:  Distally warm, no edema.  Neuro:  Alert, oriented, memory/cognition/affect are normal.  Gait is normal.    LABORATORY STUDIES (Reviewed with the patient during his appointment today):     Treponema Antibody Total 06/14/2023 Reactive (A)  Nonreactive Final    The Anti-Treponema Antibody screening tends to remain positive for life, therefore it does not distinguish between active and past syphilis infections. All positive and equivocal results will automatically reflex to a non-specific Rapid Plasma Reagin (RPR) test.    If the Treponema Antibody is positive, and the RPR is positive, this is presumptive evidence of current infection, inadequately treated infection, persistent infection or reinfection.    If the Anti-Treponema Antibody is positive and the RPR is negative, then a second Treponema specific test (TP-PA) will be performed to determine whether the antibody test is falsely positive or is detecting early infection.  If the latter is suspected, repeat testing in approximately two weeks is recommended.    Neisseria gonorrhoeae 06/14/2023 Negative  Negative Final    Negative for N. gonorrhoeae rRNA by transcription mediated amplification. A negative result by transcription mediated amplification does not preclude the presence of C. trachomatis infection because results are dependent on proper and adequate collection, absence of inhibitors and sufficient rRNA to be detected.    Chlamydia trachomatis 06/14/2023 Negative  Negative Final    A  negative result by transcription mediated amplification does not preclude the presence of C. trachomatis infection because results are dependent on proper and adequate collection, absence of inhibitors and sufficient rRNA to be detected.    Sodium 06/14/2023 142  136 - 145 mmol/L Final    Potassium 06/14/2023 4.1  3.4 - 5.3 mmol/L Final    Chloride 06/14/2023 106  98 - 107 mmol/L Final    Carbon Dioxide (CO2) 06/14/2023 25  22 - 29 mmol/L Final    Anion Gap 06/14/2023 11  7 - 15 mmol/L Final    Urea Nitrogen 06/14/2023 17.5  6.0 - 20.0 mg/dL Final    Creatinine 06/14/2023 1.37 (H)  0.67 - 1.17 mg/dL Final    Calcium 06/14/2023 9.8  8.6 - 10.0 mg/dL Final    Glucose 06/14/2023 79  70 - 99 mg/dL Final    Alkaline Phosphatase 06/14/2023 60  40 - 129 U/L Final    AST 06/14/2023 35  0 - 45 U/L Final    Reference intervals for this test were updated on 6/12/2023 to more accurately reflect our healthy population. There may be differences in the flagging of prior results with similar values performed with this method. Interpretation of those prior results can be made in the context of the updated reference intervals.    ALT 06/14/2023 37  0 - 70 U/L Final    Reference intervals for this test were updated on 6/12/2023 to more accurately reflect our healthy population. There may be differences in the flagging of prior results with similar values performed with this method. Interpretation of those prior results can be made in the context of the updated reference intervals.      Protein Total 06/14/2023 7.9  6.4 - 8.3 g/dL Final    Albumin 06/14/2023 4.6  3.5 - 5.2 g/dL Final    Bilirubin Total 06/14/2023 0.2  <=1.2 mg/dL Final    GFR Estimate 06/14/2023 72  >60 mL/min/1.73m2 Final    eGFR calculated using 2021 CKD-EPI equation.    HIV-1 RNA copies/mL 06/14/2023 <20 (A)  Not Detected Copies/mL Final    HIV-1 log 06/14/2023 <1.3   Final    CD3% Total T Cells 06/14/2023 70  49 - 84 % Final    Absolute CD3, Total T Cells  06/14/2023 1,766  603 - 2,990 cells/uL Final    CD4% [place] T Cells 06/14/2023 39  28 - 63 % Final    Absolute CD4, [place] T Cells 06/14/2023 991  441 - 2,156 cells/uL Final    CD8% Suppressor T Cells 06/14/2023 28  10 - 40 % Final    Absolute CD8, Suppressor T Cells 06/14/2023 700  125 - 1,312 cells/uL Final    CD4:CD8 Ratio 06/14/2023 1.41  1.40 - 2.60 Final    WBC Count 06/14/2023 5.4  4.0 - 11.0 10e3/uL Final    RBC Count 06/14/2023 6.22 (H)  4.40 - 5.90 10e6/uL Final    Hemoglobin 06/14/2023 14.4  13.3 - 17.7 g/dL Final    Hematocrit 06/14/2023 46.4  40.0 - 53.0 % Final    MCV 06/14/2023 75 (L)  78 - 100 fL Final    MCH 06/14/2023 23.2 (L)  26.5 - 33.0 pg Final    MCHC 06/14/2023 31.0 (L)  31.5 - 36.5 g/dL Final    RDW 06/14/2023 17.2 (H)  10.0 - 15.0 % Final    Platelet Count 06/14/2023 271  150 - 450 10e3/uL Final    % Neutrophils 06/14/2023 38  % Final    % Lymphocytes 06/14/2023 50  % Final    % Monocytes 06/14/2023 8  % Final    % Eosinophils 06/14/2023 3  % Final    % Basophils 06/14/2023 1  % Final    % Immature Granulocytes 06/14/2023 0  % Final    NRBCs per 100 WBC 06/14/2023 0  <1 /100 Final    Absolute Neutrophils 06/14/2023 2.1  1.6 - 8.3 10e3/uL Final    Absolute Lymphocytes 06/14/2023 2.6  0.8 - 5.3 10e3/uL Final    Absolute Monocytes 06/14/2023 0.5  0.0 - 1.3 10e3/uL Final    Absolute Eosinophils 06/14/2023 0.2  0.0 - 0.7 10e3/uL Final    Absolute Basophils 06/14/2023 0.0  0.0 - 0.2 10e3/uL Final    Absolute Immature Granulocytes 06/14/2023 0.0  <=0.4 10e3/uL Final    Absolute NRBCs 06/14/2023 0.0  10e3/uL Final    Bilirubin Direct 06/14/2023 <0.20  0.00 - 0.30 mg/dL Final    Rapid Plasma Reagin 06/14/2023 Reactive (A)  Nonreactive Final    Rapid Plasma Reagin Titer 06/14/2023 1:8 (H)  Non Reactive Final   Allied Health/Nurse Visit on 06/14/2023   Component Date Value Ref Range Status    Chlamydia trachomatis 06/14/2023 Negative  Negative Final    A negative result by transcription mediated  "amplification does not preclude the presence of C. trachomatis infection because results are dependent on proper and adequate collection, absence of inhibitors and sufficient rRNA to be detected.    Chlamydia trachomatis 06/14/2023 Negative  Negative Final    A negative result by transcription mediated amplification does not preclude the presence of C. trachomatis infection because results are dependent on proper and adequate collection, absence of inhibitors and sufficient rRNA to be detected.    Neisseria gonorrhoeae 06/14/2023 Negative  Negative Final    Negative for N. gonorrhoeae rRNA by transcription mediated amplification. A negative result by transcription mediated amplification does not preclude the presence of C. trachomatis infection because results are dependent on proper and adequate collection, absence of inhibitors and sufficient rRNA to be detected.    Neisseria gonorrhoeae 06/14/2023 Negative  Negative Final    Negative for N. gonorrhoeae rRNA by transcription mediated amplification. A negative result by transcription mediated amplification does not preclude the presence of C. trachomatis infection because results are dependent on proper and adequate collection, absence of inhibitors and sufficient rRNA to be detected.     IMPRESSION/PLAN:    Stable, asymptomatic HIV infection:  Mr. Berrios has an absolute CD4+ cell count consistently > 700 (since 8/19) and a persistently undetectable HIV plasma viral load and tolerates Triumeq very well.  He has still been missing two to three Triumeq (started in 7/18) doses / month, however.  We discussed again today the ideal of missing no more than a dose per month, and discussed the strategy of setting a \"back-up reminder\" phone alarm an hour after his 10 PM dose alarm, as well as taking the dose late the next morning if he forgets and evening dose.  With that, he will continue on Triumeq and return to [facility] for follow-up in one year, or as " needed before then.  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  That was treated then with a dose of benzathine penicillin and his RPR titer was down to 1:8 on 6/14/23, indicating a cure.  History of STIs treated for gonorrhea in 2/2022:  He was diagnosed with throat gonorrhea on 2/4/22 and treated with IM ceftriaxone on 2/7/22.  He was also previously diagnosed with Chlamydia in 11/2019 and treated then.  The most recent STI testing on 6/14/23 was negative.  Mildly elevated, chronic stable creatinine (stage 2 renal insufficiency):  His 6/14/23 creatinine remains stable at 1.37 which is within his baseline 1.3 - 1.5 range since at least 2018.  The low-grade renal insufficiency predates his 6/18 HIV diagnosis and the etiology is unclear.  We will continue to monitor his creatinine and repeat a urinalysis (was normal on 6/25/18) with his next Laboratory Appointment.  Healthcare maintenance:  He received Pfizer Covid-19 vaccinations on 3/18/21, 4/8/21, 10/28/21, and 3/31/23 (bivalent) -- recommended another in 10/2023.  An influenza vaccine was given 12/14/22.  Jynneos Mpox vaccines were obtained on 8/8/22 and 9/29/22.  Tdap was given 4/6/22.  He received Menactra on 10/2/20.  He received Prevnar 13 4/17/29 and Pneumovax 23 4/6/22.  Received a full Guardasil 9 series between 11/18 - 4/19.  He was hepatitis A and B viruses seroimmune on 6/27/18 and hepatitis C antibody non-reactive most recently on 4/17/19.  TB Quantiferon Gold was negative on 6/27/18.  CMV seropositive and Toxoplasma seronegative on 6/25/18.  He has not seen a dentist in the past couple of years -- encouraged to schedule a routine appointment as soon as he has a new job with dental insurance this autumn.  He exercises at a gym about times per week.

## 2023-08-17 NOTE — PROGRESS NOTES
Division of Infectious Diseases and International Medicine  Department of Medicine        [facility] OUTPATIENT VISIT NOTE [place] [phone] [place] [address]  Office: [phone]  Fax:  [phone]     HISTORY OF PRESENT ILLNESS:    Iker Berrios is a personable 27-year-old gentleman with asymptomatic HIV infection (diagnosed 6/19/18, infection probably acquired in 3/18 when he had a viral syndrome).  He returns to clinic today for annual follow-up of ongoing antiretroviral treatment with Triumeq one tablet PO each evening about 10 PM (started 7/9/18).  He continues to miss about two doses of Triumeq on average per month, usually due to being out socially on weekend evenings and then forgetting when he eventually comes home.  He does set a daily cell phone alarm to remind him of his 10 PM dose, but does not have another way to remember if he is too otherwise occupied to take the medication at the moment that alarm goes off.  He also missed about a week of Triumeq about 1.5 months ago when there was some miscommunication between his [facility] pharmacy and his healthcare insurance regarding whether his insurance was active -- he made a number of phone calls to get that resolved and he did not have difficulty obtaining his most recent medication refill.  He experiences no Triumeq side effects.  He had routine HIV monitoring laboratory studies drawn on 6/14/23 which were excellent (plasma HIV viral load < 20 copies/ml, absolute CD4+ cell count 991) except a stable chronically elevated creatinine 1.37.  He received a dose of benzathine penicillin for [facility] syphilis [facility] on 3/31/23 and his 6/14/23 RPR showed a decreasing titer down to 1:8 from a previous 1:64.  All other STI screening tests were negative.  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  With that, he has recently resumed his prior habit of visiting a gym to exercise about five times per week.  His mood has been stable over the past year even though he has been off sertraline since ~ 6/20.  Overall, he reports feeling well over the past year and he lacks new concerns or complaints today, including no recent febrile or EENT symptoms, cough, dyspnea, chest pain, GI or  symptoms, skin issues, or neurological symptoms.    PAST MEDICAL HISTORY:  Past Medical History:   Diagnosis Date    Human immunodeficiency virus I infection (H) 06/19/2018    Likely infected 3/18.  On antiretroviral therapy with Triumeq since 7/9/18.  Pre-treatment HIV viral load 2,654 copies/ml.  Scott CD4+ cell count 763.   - Diagnosed HIV positive: 6/19/18 at [facility].  (Had been on Truvada for PrEP from 5155-4125. Tested HIV negative coming off of Truvada PrEP. He was noted to have a viral illness in March 2018 that lasted about 2 weeks).  HIV plasma viral load at diagnosis: 2654.  Absolute CD4 count at diagnosis: 783. Antiretroviral therapy started 7/9/18 with Triumeq.  HIV Genotype: 6/07/18 - No resistance found.  No history of HIV-related sequelae.  HLA B57:01 negative.  - Major depression at time of HIV diagnosis.  Resolved by mid-2019, off selective serotonin reuptake inhibitor therapy since 6/20, no further counseling by autumn 2019.  - STI history:  6/19/2018 GC/Chlamydia Negative, 6/27/2018 RPR Negative, HCV negative.  11/8/2018: TPA nonreactive.  11/14/2018: GC/CT Urine and rectal positive, GC Throat positive.  1/16/19 and 4/17/19: GC/CT, treponema negative.  8/16/19: GC negative, CT positive from urine and rectum, treponema negative.  Throat again positive for gonorrhea 2/4/22.    Past Surgical History:   Procedure Laterality Date     "COSMETIC SURGERY      none      ORTHOPEDIC SURGERY      TONSILLECTOMY Bilateral 12/20/2022    Procedure: BILATERAL TONSILLECTOMY;  Surgeon: Toby Cabrera MD;  Location: [facility] OR     ALLERGIES:   No Known Allergies    MEDICATIONS:  Current Outpatient Medications   Medication Sig Dispense Refill    abacavir-dolutegravir-LamiVUDine (TRIUMEQ) 600- MG per tablet Take 1 tablet by mouth daily 30 tablet 11     SOCIAL HISTORY:  [employer] bachelor's degree in psychology obtained spring 2020 and now finished a graduate Master's Degree program in Social Work focusing on Integrated Behvioral Health at the [employer] this summer.  Presently exploring employment options.  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 Deployed to [employer] for three years.  Sexual preference: MSM, both insertive and receptive, inconsistent condom use.  Illicit drugs:  None.  Exercises at a gym about five times per week.    FAMILY HISTORY:  Family History   Problem Relation Age of Onset    Diabetes Maternal Grandmother     Asthma No family hx of     C.A.D. No family hx of     Hypertension No family hx of     Cerebrovascular Disease No family hx of     Cancer No family hx of      REVIEW OF SYSTEMS:  As per HPI.    PHYSICAL EXAMINATION:  General:  A pleasant, articulate WDWN, 27-year-old man in no discomfort.  Vital signs:  /80   Pulse 62   Ht 1.764 m (5' 9.45\")   Wt 107.2 kg (236 lb 6.4 oz)   SpO2 93%   BMI 34.46 kg/m   (weight / BMI are stable over the past half year but increased ten+ pounds versus 4/2022).  Skin:  No visible rash or lesion.  Head/Neck:  NCAT.  External auditory canals are patent without otorrhea.  PERRL.  EOMI.  Conjunctivae are pink and uninjected.  Sclerae are anicteric.  Oropharynx has no erythema, exudate, or lesion.  Dentition is normal.  Neck is supple without lymphadenopathy or thyromegaly.  Chest:  Bilaterally clear to auscultation.  CV:  RRR.  Normal S1, S2, without " gallop, murmur, or rub.  Abdomen:  Bowel sounds active, soft, nontender, no hepatosplenomegaly.  Back:  No low back or CVA tenderness.  Extremities:  Distally warm, no edema.  Neuro:  Alert, oriented, memory/cognition/affect are normal.  Gait is normal.    LABORATORY STUDIES (Reviewed with the patient during his appointment today):     Treponema Antibody Total 06/14/2023 Reactive (A)  Nonreactive Final    The Anti-Treponema Antibody screening tends to remain positive for life, therefore it does not distinguish between active and past syphilis infections. All positive and equivocal results will automatically reflex to a non-specific Rapid Plasma Reagin (RPR) test.    If the Treponema Antibody is positive, and the RPR is positive, this is presumptive evidence of current infection, inadequately treated infection, persistent infection or reinfection.    If the Anti-Treponema Antibody is positive and the RPR is negative, then a second Treponema specific test (TP-PA) will be performed to determine whether the antibody test is falsely positive or is detecting early infection.  If the latter is suspected, repeat testing in approximately two weeks is recommended.    Neisseria gonorrhoeae 06/14/2023 Negative  Negative Final    Negative for N. gonorrhoeae rRNA by transcription mediated amplification. A negative result by transcription mediated amplification does not preclude the presence of C. trachomatis infection because results are dependent on proper and adequate collection, absence of inhibitors and sufficient rRNA to be detected.    Chlamydia trachomatis 06/14/2023 Negative  Negative Final    A negative result by transcription mediated amplification does not preclude the presence of C. trachomatis infection because results are dependent on proper and adequate collection, absence of inhibitors and sufficient rRNA to be detected.    Sodium 06/14/2023 142  136 - 145 mmol/L Final    Potassium 06/14/2023 4.1  3.4 - 5.3 mmol/L  Final    Chloride 06/14/2023 106  98 - 107 mmol/L Final    Carbon Dioxide (CO2) 06/14/2023 25  22 - 29 mmol/L Final    Anion Gap 06/14/2023 11  7 - 15 mmol/L Final    Urea Nitrogen 06/14/2023 17.5  6.0 - 20.0 mg/dL Final    Creatinine 06/14/2023 1.37 (H)  0.67 - 1.17 mg/dL Final    Calcium 06/14/2023 9.8  8.6 - 10.0 mg/dL Final    Glucose 06/14/2023 79  70 - 99 mg/dL Final    Alkaline Phosphatase 06/14/2023 60  40 - 129 U/L Final    AST 06/14/2023 35  0 - 45 U/L Final    Reference intervals for this test were updated on 6/12/2023 to more accurately reflect our healthy population. There may be differences in the flagging of prior results with similar values performed with this method. Interpretation of those prior results can be made in the context of the updated reference intervals.    ALT 06/14/2023 37  0 - 70 U/L Final    Reference intervals for this test were updated on 6/12/2023 to more accurately reflect our healthy population. There may be differences in the flagging of prior results with similar values performed with this method. Interpretation of those prior results can be made in the context of the updated reference intervals.      Protein Total 06/14/2023 7.9  6.4 - 8.3 g/dL Final    Albumin 06/14/2023 4.6  3.5 - 5.2 g/dL Final    Bilirubin Total 06/14/2023 0.2  <=1.2 mg/dL Final    GFR Estimate 06/14/2023 72  >60 mL/min/1.73m2 Final    eGFR calculated using 2021 CKD-EPI equation.    HIV-1 RNA copies/mL 06/14/2023 <20 (A)  Not Detected Copies/mL Final    HIV-1 log 06/14/2023 <1.3   Final    CD3% Total T Cells 06/14/2023 70  49 - 84 % Final    Absolute CD3, Total T Cells 06/14/2023 1,766  603 - 2,990 cells/uL Final    CD4% [place] T Cells 06/14/2023 39  28 - 63 % Final    Absolute CD4, [place] T Cells 06/14/2023 991  441 - 2,156 cells/uL Final    CD8% Suppressor T Cells 06/14/2023 28  10 - 40 % Final    Absolute CD8, Suppressor T Cells 06/14/2023 700  125 - 1,312 cells/uL Final    CD4:CD8 Ratio 06/14/2023  1.41  1.40 - 2.60 Final    WBC Count 06/14/2023 5.4  4.0 - 11.0 10e3/uL Final    RBC Count 06/14/2023 6.22 (H)  4.40 - 5.90 10e6/uL Final    Hemoglobin 06/14/2023 14.4  13.3 - 17.7 g/dL Final    Hematocrit 06/14/2023 46.4  40.0 - 53.0 % Final    MCV 06/14/2023 75 (L)  78 - 100 fL Final    MCH 06/14/2023 23.2 (L)  26.5 - 33.0 pg Final    MCHC 06/14/2023 31.0 (L)  31.5 - 36.5 g/dL Final    RDW 06/14/2023 17.2 (H)  10.0 - 15.0 % Final    Platelet Count 06/14/2023 271  150 - 450 10e3/uL Final    % Neutrophils 06/14/2023 38  % Final    % Lymphocytes 06/14/2023 50  % Final    % Monocytes 06/14/2023 8  % Final    % Eosinophils 06/14/2023 3  % Final    % Basophils 06/14/2023 1  % Final    % Immature Granulocytes 06/14/2023 0  % Final    NRBCs per 100 WBC 06/14/2023 0  <1 /100 Final    Absolute Neutrophils 06/14/2023 2.1  1.6 - 8.3 10e3/uL Final    Absolute Lymphocytes 06/14/2023 2.6  0.8 - 5.3 10e3/uL Final    Absolute Monocytes 06/14/2023 0.5  0.0 - 1.3 10e3/uL Final    Absolute Eosinophils 06/14/2023 0.2  0.0 - 0.7 10e3/uL Final    Absolute Basophils 06/14/2023 0.0  0.0 - 0.2 10e3/uL Final    Absolute Immature Granulocytes 06/14/2023 0.0  <=0.4 10e3/uL Final    Absolute NRBCs 06/14/2023 0.0  10e3/uL Final    Bilirubin Direct 06/14/2023 <0.20  0.00 - 0.30 mg/dL Final    Rapid Plasma Reagin 06/14/2023 Reactive (A)  Nonreactive Final    Rapid Plasma Reagin Titer 06/14/2023 1:8 (H)  Non Reactive Final   Allied Health/Nurse Visit on 06/14/2023   Component Date Value Ref Range Status    Chlamydia trachomatis 06/14/2023 Negative  Negative Final    A negative result by transcription mediated amplification does not preclude the presence of C. trachomatis infection because results are dependent on proper and adequate collection, absence of inhibitors and sufficient rRNA to be detected.    Chlamydia trachomatis 06/14/2023 Negative  Negative Final    A negative result by transcription mediated amplification does not preclude the  "presence of C. trachomatis infection because results are dependent on proper and adequate collection, absence of inhibitors and sufficient rRNA to be detected.    Neisseria gonorrhoeae 06/14/2023 Negative  Negative Final    Negative for N. gonorrhoeae rRNA by transcription mediated amplification. A negative result by transcription mediated amplification does not preclude the presence of C. trachomatis infection because results are dependent on proper and adequate collection, absence of inhibitors and sufficient rRNA to be detected.    Neisseria gonorrhoeae 06/14/2023 Negative  Negative Final    Negative for N. gonorrhoeae rRNA by transcription mediated amplification. A negative result by transcription mediated amplification does not preclude the presence of C. trachomatis infection because results are dependent on proper and adequate collection, absence of inhibitors and sufficient rRNA to be detected.     IMPRESSION/PLAN:    Stable, asymptomatic HIV infection:  Mr. Berrios has an absolute CD4+ cell count consistently > 700 (since 8/19) and a persistently undetectable HIV plasma viral load and tolerates Triumeq very well.  He has still been missing two to three Triumeq (started in 7/18) doses / month, however.  We discussed again today the ideal of missing no more than a dose per month, and discussed the strategy of setting a \"back-up reminder\" phone alarm an hour after his 10 PM dose alarm, as well as taking the dose late the next morning if he forgets and evening dose.  With that, he will continue on Triumeq and return to [facility] for follow-up in one year, or as needed before then.  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  That was treated then with a dose of benzathine penicillin and his RPR titer was down to 1:8 on 6/14/23, indicating a cure.  History of STIs " treated for gonorrhea in 2/2022:  He was diagnosed with throat gonorrhea on 2/4/22 and treated with IM ceftriaxone on 2/7/22.  He was also previously diagnosed with Chlamydia in 11/2019 and treated then.  The most recent STI testing on 6/14/23 was negative.  Mildly elevated, chronic stable creatinine (stage 2 renal insufficiency):  His 6/14/23 creatinine remains stable at 1.37 which is within his baseline 1.3 - 1.5 range since at least 2018.  The low-grade renal insufficiency predates his 6/18 HIV diagnosis and the etiology is unclear.  We will continue to monitor his creatinine and repeat a urinalysis (was normal on 6/25/18) with his next Laboratory Appointment.  Healthcare maintenance:  He received Pfizer Covid-19 vaccinations on 3/18/21, 4/8/21, 10/28/21, and 3/31/23 (bivalent) -- recommended another in 10/2023.  An influenza vaccine was given 12/14/22.  Jynneos Mpox vaccines were obtained on 8/8/22 and 9/29/22.  Tdap was given 4/6/22.  He received Menactra on 10/2/20.  He received Prevnar 13 4/17/29 and Pneumovax 23 4/6/22.  Received a full Guardasil 9 series between 11/18 - 4/19.  He was hepatitis A and B viruses seroimmune on 6/27/18 and hepatitis C antibody non-reactive most recently on 4/17/19.  TB Quantiferon Gold was negative on 6/27/18.  CMV seropositive and Toxoplasma seronegative on 6/25/18.  He has not seen a dentist in the past couple of years -- encouraged to schedule a routine appointment as soon as he has a new job with dental insurance this autumn.  He exercises at a gym about times per week.

## 2023-09-27 ENCOUNTER — ALLIED HEALTH/NURSE VISIT (OUTPATIENT)
Dept: TRANSPLANT | Facility: CLINIC | Age: 28
End: 2023-09-27
Attending: INTERNAL MEDICINE
Payer: COMMERCIAL

## 2023-09-27 ENCOUNTER — APPOINTMENT (OUTPATIENT)
Dept: LAB | Facility: CLINIC | Age: 28
End: 2023-09-27
Payer: COMMERCIAL

## 2023-09-27 DIAGNOSIS — Z86.19 HISTORY OF SEXUALLY TRANSMITTED DISEASE: ICD-10-CM

## 2023-09-27 PROCEDURE — 87491 CHLMYD TRACH DNA AMP PROBE: CPT

## 2023-09-27 PROCEDURE — 87591 N.GONORRHOEAE DNA AMP PROB: CPT

## 2023-09-28 DIAGNOSIS — A74.9 CHLAMYDIA: Primary | ICD-10-CM

## 2023-09-28 LAB
C TRACH DNA SPEC QL NAA+PROBE: NEGATIVE
C TRACH DNA SPEC QL NAA+PROBE: NEGATIVE
C TRACH DNA SPEC QL NAA+PROBE: POSITIVE
N GONORRHOEA DNA SPEC QL NAA+PROBE: NEGATIVE

## 2023-09-28 RX ORDER — DOXYCYCLINE HYCLATE 100 MG
100 TABLET ORAL 2 TIMES DAILY
Qty: 14 TABLET | Refills: 0 | Status: SHIPPED | OUTPATIENT
Start: 2023-09-28 | End: 2023-10-05

## 2023-10-23 DIAGNOSIS — Z86.19 HISTORY OF SEXUALLY TRANSMITTED DISEASE: Primary | ICD-10-CM

## 2023-10-25 ENCOUNTER — ALLIED HEALTH/NURSE VISIT (OUTPATIENT)
Dept: TRANSPLANT | Facility: CLINIC | Age: 28
End: 2023-10-25
Attending: INTERNAL MEDICINE
Payer: COMMERCIAL

## 2023-10-25 ENCOUNTER — LAB (OUTPATIENT)
Dept: LAB | Facility: CLINIC | Age: 28
End: 2023-10-25
Payer: COMMERCIAL

## 2023-10-25 DIAGNOSIS — Z86.19 HISTORY OF SEXUALLY TRANSMITTED DISEASE: ICD-10-CM

## 2023-10-25 LAB
C TRACH DNA SPEC QL NAA+PROBE: NEGATIVE
C TRACH DNA SPEC QL NAA+PROBE: NEGATIVE
N GONORRHOEA DNA SPEC QL NAA+PROBE: NEGATIVE
N GONORRHOEA DNA SPEC QL NAA+PROBE: NEGATIVE
T PALLIDUM AB SER QL: REACTIVE

## 2023-10-25 PROCEDURE — 87591 N.GONORRHOEAE DNA AMP PROB: CPT

## 2023-10-25 PROCEDURE — 86780 TREPONEMA PALLIDUM: CPT | Performed by: INTERNAL MEDICINE

## 2023-10-25 PROCEDURE — 87491 CHLMYD TRACH DNA AMP PROBE: CPT | Performed by: INTERNAL MEDICINE

## 2023-10-25 PROCEDURE — 99000 SPECIMEN HANDLING OFFICE-LAB: CPT | Performed by: PATHOLOGY

## 2023-10-25 PROCEDURE — 87591 N.GONORRHOEAE DNA AMP PROB: CPT | Performed by: INTERNAL MEDICINE

## 2023-10-25 PROCEDURE — 86592 SYPHILIS TEST NON-TREP QUAL: CPT | Performed by: INTERNAL MEDICINE

## 2023-10-25 PROCEDURE — 86593 SYPHILIS TEST NON-TREP QUANT: CPT | Performed by: INTERNAL MEDICINE

## 2023-10-25 PROCEDURE — 36415 COLL VENOUS BLD VENIPUNCTURE: CPT | Performed by: PATHOLOGY

## 2023-10-25 PROCEDURE — 87491 CHLMYD TRACH DNA AMP PROBE: CPT

## 2023-10-26 LAB
C TRACH DNA SPEC QL NAA+PROBE: NEGATIVE
N GONORRHOEA DNA SPEC QL NAA+PROBE: NEGATIVE
RPR SER QL: REACTIVE
RPR SER-TITR: ABNORMAL {TITER}

## 2023-11-25 ENCOUNTER — E-VISIT (OUTPATIENT)
Dept: URGENT CARE | Facility: CLINIC | Age: 28
End: 2023-11-25
Payer: COMMERCIAL

## 2023-11-25 DIAGNOSIS — R21 RASH AND NONSPECIFIC SKIN ERUPTION: Primary | ICD-10-CM

## 2023-11-25 PROCEDURE — 99207 PR NON-BILLABLE SERV PER CHARTING: CPT | Performed by: PHYSICIAN ASSISTANT

## 2023-11-25 NOTE — PATIENT INSTRUCTIONS
Dear Iker Berrios,    We are sorry you are not feeling well. Based on the responses you provided, it is recommended that you be seen in-person in urgent care so we can better evaluate your symptoms. Please click here to find the nearest urgent care location to you.   You will not be charged for this Visit. Thank you for trusting us with your care.    Jessica Balderrama PA-C

## 2023-12-18 ENCOUNTER — LAB (OUTPATIENT)
Dept: LAB | Facility: CLINIC | Age: 28
End: 2023-12-18
Payer: COMMERCIAL

## 2023-12-18 ENCOUNTER — ALLIED HEALTH/NURSE VISIT (OUTPATIENT)
Dept: TRANSPLANT | Facility: CLINIC | Age: 28
End: 2023-12-18
Attending: INTERNAL MEDICINE
Payer: COMMERCIAL

## 2023-12-18 DIAGNOSIS — Z86.19 HISTORY OF SEXUALLY TRANSMITTED DISEASE: ICD-10-CM

## 2023-12-18 DIAGNOSIS — Z86.19 HISTORY OF SEXUALLY TRANSMITTED DISEASE: Primary | ICD-10-CM

## 2023-12-18 DIAGNOSIS — R79.89 ELEVATED LFTS: ICD-10-CM

## 2023-12-18 LAB
C TRACH DNA SPEC QL NAA+PROBE: NEGATIVE
HBV SURFACE AG SERPL QL IA: NONREACTIVE
N GONORRHOEA DNA SPEC QL NAA+PROBE: NEGATIVE
T PALLIDUM AB SER QL: REACTIVE

## 2023-12-18 PROCEDURE — 87591 N.GONORRHOEAE DNA AMP PROB: CPT | Performed by: INTERNAL MEDICINE

## 2023-12-18 PROCEDURE — 36415 COLL VENOUS BLD VENIPUNCTURE: CPT | Performed by: PATHOLOGY

## 2023-12-18 PROCEDURE — 99000 SPECIMEN HANDLING OFFICE-LAB: CPT | Performed by: PATHOLOGY

## 2023-12-18 PROCEDURE — 87340 HEPATITIS B SURFACE AG IA: CPT | Performed by: FAMILY MEDICINE

## 2023-12-18 PROCEDURE — 86780 TREPONEMA PALLIDUM: CPT | Performed by: INTERNAL MEDICINE

## 2023-12-18 PROCEDURE — 87591 N.GONORRHOEAE DNA AMP PROB: CPT

## 2023-12-18 PROCEDURE — 87491 CHLMYD TRACH DNA AMP PROBE: CPT | Performed by: INTERNAL MEDICINE

## 2023-12-18 PROCEDURE — 87491 CHLMYD TRACH DNA AMP PROBE: CPT

## 2023-12-18 PROCEDURE — 86593 SYPHILIS TEST NON-TREP QUANT: CPT | Performed by: INTERNAL MEDICINE

## 2023-12-18 PROCEDURE — 86592 SYPHILIS TEST NON-TREP QUAL: CPT | Performed by: INTERNAL MEDICINE

## 2023-12-19 LAB
RPR SER QL: REACTIVE
RPR SER-TITR: NORMAL {TITER}

## 2024-02-19 ENCOUNTER — TELEPHONE (OUTPATIENT)
Dept: INFECTIOUS DISEASES | Facility: CLINIC | Age: 29
End: 2024-02-19
Payer: COMMERCIAL

## 2024-05-14 ENCOUNTER — HOSPITAL ENCOUNTER (EMERGENCY)
Facility: CLINIC | Age: 29
Discharge: HOME | End: 2024-05-14
Attending: EMERGENCY MEDICINE | Admitting: EMERGENCY MEDICINE
Payer: COMMERCIAL

## 2024-05-14 VITALS
OXYGEN SATURATION: 100 % | WEIGHT: 230 LBS | SYSTOLIC BLOOD PRESSURE: 142 MMHG | BODY MASS INDEX: 32.93 KG/M2 | TEMPERATURE: 96.9 F | HEIGHT: 70 IN | DIASTOLIC BLOOD PRESSURE: 78 MMHG | RESPIRATION RATE: 18 BRPM | HEART RATE: 65 BPM

## 2024-05-14 DIAGNOSIS — T63.441A BEE STING, ACCIDENTAL OR UNINTENTIONAL, INITIAL ENCOUNTER: ICD-10-CM

## 2024-05-14 PROCEDURE — 99284 EMERGENCY DEPT VISIT MOD MDM: CPT | Performed by: EMERGENCY MEDICINE

## 2024-05-14 RX ORDER — DIPHENHYDRAMINE HCL 50 MG
50 CAPSULE ORAL ONCE
Status: DISCONTINUED | OUTPATIENT
Start: 2024-05-14 | End: 2024-05-14 | Stop reason: HOSPADM

## 2024-05-14 RX ORDER — PREDNISONE 20 MG/1
TABLET ORAL
Qty: 10 TABLET | Refills: 0 | Status: SHIPPED | OUTPATIENT
Start: 2024-05-14

## 2024-05-14 RX ORDER — FAMOTIDINE 20 MG/1
20 TABLET, FILM COATED ORAL ONCE
Status: DISCONTINUED | OUTPATIENT
Start: 2024-05-14 | End: 2024-05-14 | Stop reason: HOSPADM

## 2024-05-14 RX ORDER — EPINEPHRINE 0.3 MG/.3ML
0.3 INJECTION SUBCUTANEOUS
Qty: 2 EACH | Refills: 0 | Status: SHIPPED | OUTPATIENT
Start: 2024-05-14

## 2024-05-14 ASSESSMENT — COLUMBIA-SUICIDE SEVERITY RATING SCALE - C-SSRS
6. HAVE YOU EVER DONE ANYTHING, STARTED TO DO ANYTHING, OR PREPARED TO DO ANYTHING TO END YOUR LIFE?: NO
1. IN THE PAST MONTH, HAVE YOU WISHED YOU WERE DEAD OR WISHED YOU COULD GO TO SLEEP AND NOT WAKE UP?: NO
2. HAVE YOU ACTUALLY HAD ANY THOUGHTS OF KILLING YOURSELF IN THE PAST MONTH?: NO

## 2024-05-14 ASSESSMENT — ACTIVITIES OF DAILY LIVING (ADL): ADLS_ACUITY_SCORE: 33

## 2024-05-14 NOTE — ED TRIAGE NOTES
Pt ambulatory to triage with c/o lip swelling. Pt states he may have been stung by a bee on Sunday. Pt states the swelling to lip has increased. Pt denies swallowing concerns, able to talk in full sentences     Triage Assessment (Adult)       Row Name 05/14/24 0700          Triage Assessment    Airway WDL WDL        Respiratory WDL    Respiratory WDL WDL        Skin Circulation/Temperature WDL    Skin Circulation/Temperature WDL X        Cardiac WDL    Cardiac WDL WDL        Peripheral/Neurovascular WDL    Peripheral Neurovascular WDL WDL        Cognitive/Neuro/Behavioral WDL    Cognitive/Neuro/Behavioral WDL WDL

## 2024-05-14 NOTE — DISCHARGE INSTRUCTIONS
I was sorry to hear that you were still experiencing swelling of the lip.  As we discussed typically treatment is supportive.  You were prescribed a medication called prednisone which is a steroid hormone to decrease inflammation and swelling.  This typically takes hours to a day to begin to kick in.  I would anticipate gradual decrease in swelling.  As we discussed I would estimate 80 to 90% of patients just with over-the-counter antihistamine medication plus steroids over 1 to 3 days will have self resolution of symptoms.  There is a small subset of patients that can have progression of symptoms.  Should symptoms persist or gradually increase I would utilize steroids prescribed.  Should you have any severe worsening of symptoms such as tongue swelling, drooling, difficulty breathing, wheezing we would want you to call or return emergently for reevaluation.

## 2024-05-14 NOTE — ED PROVIDER NOTES
ED PROVIDER NOTE  May 14, 2024  History     Chief Complaint   Patient presents with    Oral Swelling     HPI  Iker Berrios is a 28 year old male who arrives today to the emergency department for evaluation of superior lip swelling after a bee sting.  The patient reports initial sting occurring approxiforty 8 hours ago.  He initially noted minor swelling of the upper lip at the site of the sting.  Yesterday he reports some progression of swelling and today slight worsening.  He reports no other symptoms such as shortness of breath, wheezing, sensation of throat closure.  No swelling of the tongue.  Patient ports no rash, nausea, vomiting.  No known allergens to bees.  He has attempted ibuprofen and Zyrtec over-the-counter with no significant movement.      Past Medical History  Past Medical History:   Diagnosis Date    Human immunodeficiency virus I infection (H) 06/19/2018    Likely infected 3/18.  On antiretroviral therapy with Triumeq since 7/9/18.  Pre-treatment HIV viral load 2,654 copies/ml.  Scott CD4+ cell count 763.     Past Surgical History:   Procedure Laterality Date    COSMETIC SURGERY      none      ORTHOPEDIC SURGERY      TONSILLECTOMY Bilateral 12/20/2022    Procedure: BILATERAL TONSILLECTOMY;  Surgeon: Toby Cabrera MD;  Location: [facility] OR     EPINEPHrine (ANY BX GENERIC EQUIV) 0.3 MG/0.3ML injection 2-pack  predniSONE (DELTASONE) 20 MG tablet  abacavir-dolutegravir-LamiVUDine (TRIUMEQ) 600- MG per tablet      No Known Allergies  Family History  Family History   Problem Relation Age of Onset    Diabetes Maternal Grandmother     Asthma No family hx of     C.A.D. No family hx of     Hypertension No family hx of     Cerebrovascular Disease No family hx of     Cancer No family hx of      Social History   Social History     Tobacco Use    Smoking status: Never    Smokeless tobacco: Never   Vaping Use    Vaping status: Never Used   Substance Use Topics    Alcohol use: Yes     Comment: mixed,  "weekly    Drug use: No         A medically appropriate review of systems was performed with pertinent positives and negatives noted in the HPI, and all other systems negative.      Physical Exam   BP: (!) 142/78  Pulse: 65  Temp: 96.9  F (36.1  C)  Resp: 18  Height: 177.8 cm (5' 10\")  Weight: 104.3 kg (230 lb)  SpO2: 100 %      Physical Exam  Nursing note reviewed.   Constitutional:       General: He is not in acute distress.     Appearance: He is ill-appearing. He is not toxic-appearing or diaphoretic.   HENT:      Head: Normocephalic and atraumatic.      Mouth/Throat:      Lips: Pink. No lesions.      Mouth: Mucous membranes are moist.      Tongue: No lesions.      Pharynx: Oropharynx is clear. Uvula midline. No pharyngeal swelling, oropharyngeal exudate, posterior oropharyngeal erythema or uvula swelling.        Comments: Edema of upper lip.  No evidence of tongue elevation, drooling, intraoral lesion, posterior pharyngeal edema.  Cardiovascular:      Rate and Rhythm: Normal rate and regular rhythm.   Pulmonary:      Effort: Pulmonary effort is normal. No respiratory distress.      Breath sounds: No stridor.   Skin:     Findings: No rash.   Neurological:      General: No focal deficit present.      Mental Status: He is alert.   Psychiatric:         Mood and Affect: Mood normal.         Behavior: Behavior normal.         ED Course        Procedures         No results found for this or any previous visit (from the past 24 hour(s)).  Medications   diphenhydrAMINE (BENADRYL) capsule 50 mg (has no administration in time range)   famotidine (PEPCID) tablet 20 mg (has no administration in time range)   dexAMETHasone (DECADRON) tablet 10 mg (has no administration in time range)             Critical care was not performed.     Medical Decision Making  The patient's presentation was of low complexity (2+ clearly self-limited or minor problems).    The patient's evaluation involved:  history and exam without other MDM data " elements    The patient's management necessitated moderate risk (prescription drug management including medications given in the ED).    Assessments & Plan (with Medical Decision Making)     Iker Berrios is a 28 year old male who arrives today to the emergency department for evaluation of superior lip swelling after a bee sting.  Upon arrival patient is alert.  Presently afebrile and hemodynamically stable.  External evaluation does demonstrate some swelling of the superior lip.  By palpation I do not appreciate any persistent stinger present.  There is no sign of localized cellulitis.  Intraoral evaluation demonstrate no tongue elevation or drooling.  No posterior pharyngeal edema.  He has no stridor or evidence of shortness of breath I do not believe requires IV epinephrine.  No evidence of systemic rash or evidence of anaphylaxis no GI symptoms.  I would plan for antihistamine medications and a dose of dexamethasone.  I do not believe requires laboratory studies at this time I would favor observation with treatment of oral corticosteroids and time for her body to process suspected allergen.    No progression of symptoms observed in the emergency department.  Discussed plan for over-the-counter Zyrtec and/or Benadryl as needed with patient.  I did send a sensitive prescription for prednisone.  I would have him hold on taking this unless patient were to have progression of symptoms.  I did send prescription for EpiPen as well.  At this time he has a local irritation directly where bee sting is no sign of anaphylaxis or systemic symptoms.  Low suspicion he will require this but we did go over indications to use this and for emergent return.  We are certainly happy to reevaluate him at any time should he have change, progression or worsening symptoms.    I have reviewed the nursing notes.    I have reviewed the findings, diagnosis, plan and need for follow up with the patient.    New Prescriptions    EPINEPHRINE  (ANY BX GENERIC EQUIV) 0.3 MG/0.3ML INJECTION 2-PACK    Inject 0.3 mLs (0.3 mg) into the muscle once as needed for anaphylaxis May repeat one time in 5-15 minutes if response to initial dose is inadequate.    PREDNISONE (DELTASONE) 20 MG TABLET    Take two tablets (= 40mg) each day for 5 (five) days       Final diagnoses:   Bee sting, accidental or unintentional, initial encounter       CHRIST LAURENT MD    5/14/2024   [facility] EMERGENCY DEPARTMENT     Christ Laurent MD  05/14/24 0754

## 2024-06-14 ENCOUNTER — MYC MEDICAL ADVICE (OUTPATIENT)
Dept: INFECTIOUS DISEASES | Facility: CLINIC | Age: 29
End: 2024-06-14
Payer: COMMERCIAL

## 2024-06-14 DIAGNOSIS — Z21 HUMAN IMMUNODEFICIENCY VIRUS I INFECTION (H): ICD-10-CM

## 2024-06-17 RX ORDER — ABACAVIR SULFATE, DOLUTEGRAVIR SODIUM, LAMIVUDINE 600; 50; 300 MG/1; MG/1; MG/1
1 TABLET, FILM COATED ORAL DAILY
Qty: 30 TABLET | Refills: 2 | Status: SHIPPED | OUTPATIENT
Start: 2024-06-17

## 2024-06-30 ENCOUNTER — HEALTH MAINTENANCE LETTER (OUTPATIENT)
Age: 29
End: 2024-06-30

## 2024-07-09 ENCOUNTER — TELEPHONE (OUTPATIENT)
Dept: INFECTIOUS DISEASES | Facility: CLINIC | Age: 29
End: 2024-07-09
Payer: COMMERCIAL

## 2024-07-09 NOTE — TELEPHONE ENCOUNTER
EP called 7/9 to resched 9/4 follow up with Dr. Shaw; provider moving to Tues. Patient didn't want to resched because he's transferred his care to another facility.

## 2025-07-13 ENCOUNTER — HEALTH MAINTENANCE LETTER (OUTPATIENT)
Age: 30
End: 2025-07-13

## (undated) DEVICE — SUCTION TIP YANKAUER W/O VENT K86

## (undated) DEVICE — GOWN XLG DISP 9545

## (undated) DEVICE — DRAPE SHEET HALF 40X60" 9358

## (undated) DEVICE — SOL NACL 0.9% IRRIG 1000ML BOTTLE 07138-09

## (undated) DEVICE — SUCTION TUBING 10' N1010

## (undated) DEVICE — SYR 10ML FINGER CONTROL W/O NDL 309695

## (undated) DEVICE — Device

## (undated) DEVICE — SOL WATER IRRIG 1000ML BOTTLE 07139-09

## (undated) DEVICE — BASIN SET MINOR DISP

## (undated) DEVICE — PACK SET-UP STD 9102

## (undated) DEVICE — MARKER SKIN DOUBLE TIP W/FLEXI-RULER W/LABELS

## (undated) DEVICE — NDL 25GA 1.5" 305127

## (undated) DEVICE — NDL 19GA 1.5"

## (undated) DEVICE — GLOVE PROTEXIS W/NEU-THERA 8.0  2D73TE80

## (undated) DEVICE — ANTIFOG SOLUTION W/FOAM PAD CF-1001

## (undated) RX ORDER — ACETAMINOPHEN 325 MG/1
TABLET ORAL
Status: DISPENSED
Start: 2022-12-20

## (undated) RX ORDER — BUPIVACAINE HYDROCHLORIDE 2.5 MG/ML
INJECTION, SOLUTION INFILTRATION; PERINEURAL
Status: DISPENSED
Start: 2022-12-20

## (undated) RX ORDER — ONDANSETRON 2 MG/ML
INJECTION INTRAMUSCULAR; INTRAVENOUS
Status: DISPENSED
Start: 2022-12-20

## (undated) RX ORDER — PROPOFOL 10 MG/ML
INJECTION, EMULSION INTRAVENOUS
Status: DISPENSED
Start: 2022-12-20

## (undated) RX ORDER — DEXAMETHASONE SODIUM PHOSPHATE 4 MG/ML
INJECTION, SOLUTION INTRA-ARTICULAR; INTRALESIONAL; INTRAMUSCULAR; INTRAVENOUS; SOFT TISSUE
Status: DISPENSED
Start: 2022-12-20